# Patient Record
Sex: FEMALE | Race: BLACK OR AFRICAN AMERICAN | NOT HISPANIC OR LATINO | Employment: FULL TIME | URBAN - METROPOLITAN AREA
[De-identification: names, ages, dates, MRNs, and addresses within clinical notes are randomized per-mention and may not be internally consistent; named-entity substitution may affect disease eponyms.]

---

## 2017-04-18 ENCOUNTER — HOSPITAL ENCOUNTER (OUTPATIENT)
Dept: RADIOLOGY | Facility: OTHER | Age: 32
Discharge: HOME | End: 2017-04-18
Attending: PEDIATRICS
Payer: COMMERCIAL

## 2017-04-18 DIAGNOSIS — I36.0 NONRHEUMATIC TRICUSPID (VALVE) STENOSIS: ICD-10-CM

## 2017-04-18 DIAGNOSIS — I42.9 CARDIOMYOPATHY, UNSPECIFIED TYPE: ICD-10-CM

## 2017-04-18 DIAGNOSIS — Q21.3 TOF (TETRALOGY OF FALLOT): ICD-10-CM

## 2017-04-18 DIAGNOSIS — I51.7 ENLARGED RV (RIGHT VENTRICLE): ICD-10-CM

## 2017-04-18 DIAGNOSIS — I47.20 VENTRICULAR TACHYCARDIA: ICD-10-CM

## 2017-04-18 DIAGNOSIS — Z95.810 AICD (AUTOMATIC CARDIOVERTER/DEFIBRILLATOR) PRESENT: ICD-10-CM

## 2017-04-18 DIAGNOSIS — Z95.2 S/P PULMONARY VALVE REPLACEMENT: ICD-10-CM

## 2017-04-18 DIAGNOSIS — Z87.74 TETRALOGY OF FALLOT S/P REPAIR: ICD-10-CM

## 2017-04-18 DIAGNOSIS — Z95.0 CARDIAC PACEMAKER IN SITU: ICD-10-CM

## 2017-04-18 DIAGNOSIS — I49.5 SINUS NODE DYSFUNCTION: ICD-10-CM

## 2017-04-18 PROBLEM — I07.0 TRICUSPID STENOSIS: Status: ACTIVE | Noted: 2017-04-18

## 2017-04-18 PROCEDURE — 71020 XR CHEST PA AND LATERAL: CPT | Mod: TC

## 2017-04-18 PROCEDURE — 71020 XR CHEST PA AND LATERAL: CPT | Mod: 26,,, | Performed by: RADIOLOGY

## 2017-11-21 ENCOUNTER — LAB VISIT (OUTPATIENT)
Dept: LAB | Facility: OTHER | Age: 32
End: 2017-11-21
Attending: PEDIATRICS
Payer: COMMERCIAL

## 2017-11-21 DIAGNOSIS — Q21.3 TETRALOGY OF FALLOT: ICD-10-CM

## 2017-11-21 LAB
ALBUMIN SERPL BCP-MCNC: 3.6 G/DL
ALP SERPL-CCNC: 104 U/L
ALT SERPL W/O P-5'-P-CCNC: 20 U/L
ANION GAP SERPL CALC-SCNC: 7 MMOL/L
AST SERPL-CCNC: 17 U/L
BILIRUB SERPL-MCNC: 1 MG/DL
BNP SERPL-MCNC: 162 PG/ML
BUN SERPL-MCNC: 9 MG/DL
CALCIUM SERPL-MCNC: 9.3 MG/DL
CHLORIDE SERPL-SCNC: 105 MMOL/L
CHOLEST SERPL-MCNC: 179 MG/DL
CHOLEST/HDLC SERPL: 4.8 {RATIO}
CO2 SERPL-SCNC: 26 MMOL/L
CREAT SERPL-MCNC: 0.8 MG/DL
ERYTHROCYTE [DISTWIDTH] IN BLOOD BY AUTOMATED COUNT: 13.7 %
EST. GFR  (AFRICAN AMERICAN): >60 ML/MIN/1.73 M^2
EST. GFR  (NON AFRICAN AMERICAN): >60 ML/MIN/1.73 M^2
GLUCOSE SERPL-MCNC: 92 MG/DL
HCT VFR BLD AUTO: 38.8 %
HDLC SERPL-MCNC: 37 MG/DL
HDLC SERPL: 20.7 %
HGB BLD-MCNC: 12.8 G/DL
LDLC SERPL CALC-MCNC: 123.4 MG/DL
MCH RBC QN AUTO: 27.9 PG
MCHC RBC AUTO-ENTMCNC: 33 G/DL
MCV RBC AUTO: 85 FL
NONHDLC SERPL-MCNC: 142 MG/DL
PLATELET # BLD AUTO: 352 K/UL
PMV BLD AUTO: 9.4 FL
POTASSIUM SERPL-SCNC: 4 MMOL/L
PROT SERPL-MCNC: 7.9 G/DL
RBC # BLD AUTO: 4.59 M/UL
SODIUM SERPL-SCNC: 138 MMOL/L
T3FREE SERPL-MCNC: 1.8 PG/ML
T4 FREE SERPL-MCNC: 0.9 NG/DL
TRIGL SERPL-MCNC: 93 MG/DL
TSH SERPL DL<=0.005 MIU/L-ACNC: 1.42 UIU/ML
WBC # BLD AUTO: 8.12 K/UL

## 2017-11-21 PROCEDURE — 80061 LIPID PANEL: CPT

## 2017-11-21 PROCEDURE — 84481 FREE ASSAY (FT-3): CPT

## 2017-11-21 PROCEDURE — 80053 COMPREHEN METABOLIC PANEL: CPT

## 2017-11-21 PROCEDURE — 85027 COMPLETE CBC AUTOMATED: CPT

## 2017-11-21 PROCEDURE — 36415 COLL VENOUS BLD VENIPUNCTURE: CPT

## 2017-11-21 PROCEDURE — 84439 ASSAY OF FREE THYROXINE: CPT

## 2017-11-21 PROCEDURE — 84443 ASSAY THYROID STIM HORMONE: CPT

## 2017-11-21 PROCEDURE — 83880 ASSAY OF NATRIURETIC PEPTIDE: CPT

## 2017-12-05 DIAGNOSIS — I47.20 VT (VENTRICULAR TACHYCARDIA): Primary | ICD-10-CM

## 2017-12-05 DIAGNOSIS — I49.5 SINUS NODE DYSFUNCTION: ICD-10-CM

## 2018-01-10 ENCOUNTER — TELEPHONE (OUTPATIENT)
Dept: PEDIATRIC CARDIOLOGY | Facility: CLINIC | Age: 33
End: 2018-01-10

## 2018-01-10 NOTE — TELEPHONE ENCOUNTER
Called Mrs. Baez regarding scheduled cardiac cath procedure. Offered February 6th at 10:00 AM. Pre-procedural instructions given and pt stated understanding. Address verified and letter with instructions placed in the mail. Instructed pt to call with any further questions or concerns. Pt. Stated understanding.

## 2018-01-10 NOTE — TELEPHONE ENCOUNTER
----- Message from Lula Goncalves RN sent at 1/3/2018 11:57 AM CST -----      ----- Message -----  From: ELIN Jacobson  Sent: 1/3/2018  11:49 AM  To: NATHAN Gee,     Any idea when this patient will get on the book for a cath? The patient was asking and I just wanted to follow up.    Thanks,  Jason

## 2018-01-11 ENCOUNTER — OFFICE VISIT (OUTPATIENT)
Dept: PEDIATRIC CARDIOLOGY | Facility: CLINIC | Age: 33
End: 2018-01-11
Attending: PEDIATRICS
Payer: COMMERCIAL

## 2018-01-11 ENCOUNTER — OFFICE VISIT (OUTPATIENT)
Dept: CARDIOLOGY | Facility: CLINIC | Age: 33
End: 2018-01-11
Payer: COMMERCIAL

## 2018-01-11 ENCOUNTER — CLINICAL SUPPORT (OUTPATIENT)
Dept: CARDIOLOGY | Facility: CLINIC | Age: 33
End: 2018-01-11
Payer: COMMERCIAL

## 2018-01-11 VITALS
HEIGHT: 69 IN | BODY MASS INDEX: 38.14 KG/M2 | SYSTOLIC BLOOD PRESSURE: 118 MMHG | OXYGEN SATURATION: 96 % | DIASTOLIC BLOOD PRESSURE: 79 MMHG | WEIGHT: 257.5 LBS | HEART RATE: 87 BPM

## 2018-01-11 DIAGNOSIS — Q21.3 TETRALOGY OF FALLOT: ICD-10-CM

## 2018-01-11 DIAGNOSIS — Q24.9 ADULT CONGENITAL HEART DISEASE: ICD-10-CM

## 2018-01-11 DIAGNOSIS — R94.30 EJECTION FRACTION < 50%: ICD-10-CM

## 2018-01-11 DIAGNOSIS — Z87.74 TETRALOGY OF FALLOT S/P REPAIR: ICD-10-CM

## 2018-01-11 DIAGNOSIS — I47.20 VT (VENTRICULAR TACHYCARDIA): ICD-10-CM

## 2018-01-11 DIAGNOSIS — Z95.810 AICD (AUTOMATIC CARDIOVERTER/DEFIBRILLATOR) PRESENT: Primary | ICD-10-CM

## 2018-01-11 DIAGNOSIS — Q21.3 TETRALOGY OF FALLOT: Primary | ICD-10-CM

## 2018-01-11 DIAGNOSIS — I49.5 SINUS NODE DYSFUNCTION: ICD-10-CM

## 2018-01-11 PROCEDURE — 93290 INTERROG DEV EVAL ICPMS IP: CPT | Mod: S$GLB,,, | Performed by: PEDIATRICS

## 2018-01-11 PROCEDURE — 94760 N-INVAS EAR/PLS OXIMETRY 1: CPT | Mod: S$GLB,,, | Performed by: PEDIATRICS

## 2018-01-11 PROCEDURE — 93320 DOPPLER ECHO COMPLETE: CPT | Mod: S$GLB,,, | Performed by: PEDIATRICS

## 2018-01-11 PROCEDURE — 93000 ELECTROCARDIOGRAM COMPLETE: CPT | Mod: 59,S$GLB,, | Performed by: PEDIATRICS

## 2018-01-11 PROCEDURE — 93283 PRGRMG EVAL IMPLANTABLE DFB: CPT | Mod: S$GLB,,, | Performed by: PEDIATRICS

## 2018-01-11 PROCEDURE — 99204 OFFICE O/P NEW MOD 45 MIN: CPT | Mod: 25,S$GLB,, | Performed by: PEDIATRICS

## 2018-01-11 PROCEDURE — 93325 DOPPLER ECHO COLOR FLOW MAPG: CPT | Mod: S$GLB,,, | Performed by: PEDIATRICS

## 2018-01-11 PROCEDURE — 93303 ECHO TRANSTHORACIC: CPT | Mod: S$GLB,,, | Performed by: PEDIATRICS

## 2018-01-11 PROCEDURE — 99215 OFFICE O/P EST HI 40 MIN: CPT | Mod: 25,S$GLB,, | Performed by: PEDIATRICS

## 2018-01-11 RX ORDER — LEVOTHYROXINE SODIUM 150 UG/1
137 TABLET ORAL
COMMUNITY
End: 2019-02-14 | Stop reason: SDUPTHER

## 2018-01-11 NOTE — PROGRESS NOTES
Sean Baez was seen in the Adult with Congenital Heart Disease Clinic at [facility] .  She is now a 32 and a half-year-old -American woman who was born with tetralogy of Fallot.  She underwent repair of tetralogy of Fallot in early childhood, and then a pulmonary valve replacement later in childhood.  In May 2005, she had a second pulmonary valve replacement with a 29 mm Mosiac porcine valve in the pulmonary position because of pulmonary insufficiency, and placement of an epicardial pacemaker for sinus node dysfunction, and placement of an automatic intracardiac defibrillator for ventricular tachycardia.  The AICD generator was changed in 2011.  We have been following her in this clinic with a diagnosis of right ventricular dysfunction, a well-functioning porcine pulmonary valve, ventricular tachycardia and sinus node dysfunction, pacemaker and AICD, tricuspid stenosis, mitral insufficiency, and mild left ventricular dysfunction.  Her rhythm has been well controlled over the last several years.  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  Her pacemaker has been working well and she has had no shocks from the AICD.  She is followed by electrophysiologist Dr. Tyler.       At a clinic visit about 9 months ago, she was feeling well.  Physical exam showed a grade 2 to 3/6 systolic ejection murmur best heard at the left mid to upper sternal border and over the precordium, and a newly heard diastolic rumble at the left lower sternal border. The liver edge was 2 cm below the right costal margin.  Pulses were 2+ in brachial and 1+ in femoral, although it was difficult to feel her pulse is secondary to obesity.  There was no peripheral edema.  An EKG showed sinus rhythm at 68 bpm, normal atrial and ventricular forces, and T-wave inversions in lead 1 V5 and V6 suggestive of ischemia (unchanged).  An echocardiogram showed an aortic root of 30 mm, the left  atrium was dilated at 44 mm, (no old values for comparison), the right ventricle was 34 mm which was slightly increased from 32 mm, and the fractional area change of 30%, showed mildly reduced function which had improved.  The interventricular septum measured 11 on the left ventricular posterior wall 12 mm which are at the upper limits of normal, the left ventricular internal dimension in diastole was 52 and in systole 36 mm giving him measured ejection fraction of 57%.  The ejection fraction is slightly decreased but improved since the previous value.  The pulmonary valve annulus measured 19 mm. There was slight paradoxic motion of the interventricular septum, with otherwise normal-appearing right and left ventricular function.  The tricuspid valve domed in diastole.  The pulmonary valve leaflets could be seen and the pulmonary annulus of 19 mm was probably underestimated.  The right atrium was dilated at 61 x 57 mm.  The left pulmonary artery measured 12 mm and the right pulmonary artery could not be seen.  The aortic arch was left-sided and unobstructed.  The gradient across the tricuspid valve was 9 peak and 4 mean mmHg gradient indicating mild stenosis, unchanged.  There was trivial tricuspid regurgitation with a regurgitant gradient of 24 mmHg suggesting normal right ventricular pressure.  The gradient across the porcine pulmonary valve was 22 peak and 13 mean mmHg showing trivial obstruction, and there was trivial pulmonary insufficiency.  There was mild mitral insufficiency and no residual ventricular septal defect.  An exercise stress test showed it Ceres exercised 7 minutes and 30 seconds of the Sam protocol, and then stopped due to fatigue and leg cramps.  Baseline blood pressure was 108/77 mmHg and then increased to 159/68 mmHg, which may be an underestimate, before returning to baseline.  Baseline heart rate was 68 bpm and increased to 147 bpm before returning to baseline.  There were initially  T-wave inversions in lead I V5 and V6, which then reverted to upright during exercise, before becoming negative again in recovery.  There were no other ST segment changes or arrhythmias.      At that visit, I felt  that Steven was stable with her complex congenital heart disease.  Her right and left ventricular function had improved.  Her porcine pulmonary valve was functioning well with only mild stenosis and insufficiency.  She had mild tricuspid stenosis with significant right atrial dilatation, also unchanged.  She had left atrial dilatation which I felt was secondary to her mitral insufficiency and perhaps some diastolic dysfunction, also unchanged.  Per her report, her pacemaker was functioning properly and her rhythm was controlled on her current dose of metoprolol.  I requested records from her electrophysiologist. No cardiac intervention was recommended  A CBC, CMP, BNP, and lipid profile were drawn which showed a normal CBC, and normal CMP, a BNP of 82 pg/mL, an elevated total cholesterol of 220 mg/dL, a low HDL cholesterol of 36 mg/dL, normal triglycerides, and an elevated LDL cholesterol of 168 mg/dL.  A chest x-ray was obtained at that visit which showed mild cardiomegaly with normal vascularity, epicardial pacing leads on the right atrium and right ventricle and the AICD patch at the apex.  Sean reported that she is getting  in a few months and desires pregnancy.  This is a routine follow-up.       At her last clinic visit 2 months ago, Sean was not feeling well.  Approximately 3 months prior, while walking on the beach, she had the sudden onset of fatigue and shortness of breath.  He felt her heart was racing but not in an abnormal way.  She also began sweating, but had no chest pain.  Although she sat down and went into air-conditioning, the fatigue, shortness of breath and sweating persisted for another 30 minutes.  Following that, she has noticed an increase in fatigue and exercise  intolerance.  She has not participated in any exercise for that reason.  She states she has the same symptoms as before her last valve replacement.  Sean has also had a very busy life and lots of stresses.  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  She has had increased fatigue with these activities, and is also sleeping more.  Her last pacemaker check was about 9 months ago.  She has had no shocks.  She was recently started on atorvastatin for elevated cholesterol, which was her only medication change.  She was taking metoprolol succinate 50 mg by mouth daily, enalapril 5 mg in the morning and 2.5 mg at night, aspirin 81 mg by mouth daily, atorvastatin 20 mg by mouth daily, Zoloft 100 mg by mouth daily, and venlafaxine 150 mg by mouth daily.  Her exam showed blood pressure in the right arm was 119/76 mmHg.  Pulse was 91 bpm and pulsed oximetry in room air was 98%.  First heart sound was normal and second heart sound was widely split.  There was a grade 2/6 systolic ejection murmur best heard at the left upper sternal border and down the left sternal border.  A diastolic rumble was heard.  The liver was 2 cm below the right costal margin and unchanged.  The pacemaker was palpated in the upper mid abdomen.  Pulses were 2+ bilaterally except for 1+ in the right femoral.  There was no peripheral edema.     EKG showed sinus rhythm at a rate of 73 bpm with first-degree block (386 ms).  T-wave inversions in leads II and aVF, normal ventricular forces, and T wave inversions in leads V1 and V5, with T-wave flattening in V6.  Compared to the previous EKG, it was no change.     An echocardiogram was obtained which showed the anatomy for tetralogy of Fallot status post complete repair and status post a pulmonary valve replacement.  It should be  mentioned that the heart was difficult to visualize and images were of poor quality chewer attributed to obesity.  M-mode parameters were as follows: The aortic root measured 30 mm, unchanged, left atrium was 35 mm, improved, right ventricle 34 mm, unchanged.  The interventricular septum measured 11 and the left ventricular posterior wall 11 mm both of which were at the upper limits of normal.  The left ventricular internal dimension in diastole was 56 and in systole 43 mm giving a calculated ejection fraction of 46% which has decreased since the previous study.  There was flattening of septal wall motion.  The left ventricular posterior wall bowed posteriorly.  The pulmonary valve leaflets are difficult to see.  The tricuspid valve leaflets were difficult to see, but the tricuspid valve annulus was subjectively smaller than the mitral valve annulus.  The ventriculoseptal defect patch was in proper position.  Right ventricular function was judged subjectively to be mildly depressed, with a fractional area change measured at 30%, which is within normal limits.  The atrial septum was intact.  The superior vena cava drains normally into the right atrium.  The branch pulmonary arteries could not be imaged.  The aortic arch was left-sided and unobstructed.  Doppler analysis using pulsed continuous-wave and color-flow: The gradient across the tricuspid valve was 10 peak and 5 mean millimeters of mercury indicating mild tricuspid stenosis which is unchanged.  The gradient across the bioprosthetic pulmonary valve was 23 peak millimeters of mercury which is unchanged.  Trivial pulmonary insufficiency.  No tricuspid insufficiency.  No aortic stenosis or aortic insufficiency.  Trivial mitral insufficiency, improved.  No aortic arch obstruction.  Impression: Difficult cardiac imaging.  Status post repair of tetralogy of flow, status post pulmonary valve replacement, pacemaker and AICD.  Mildly dilated right ventricle with  subjectively mildly decreased systolic function area mild tricuspid stenosis with a 10 peak and 5 mean millimeter mercury gradient across it, unchanged.  Mildly dilated left ventricle which has increased since the previous study, and the ejection fraction has decreased to 46%.  Mild pulmonary valve stenosis and no pulmonary valve insufficiency.     The pacemaker was then downloaded by the [employer] representative, and it showed that she is atrially paced 6% of the time with no ventricular pacing.  She had a prolonged AV interval of 370 ms.  Her underlying rhythm was sinus bradycardia with a ventricular rate in the 40s.  She had no abnormal tachycardias greater than 150 bpm.  Her atrial and ventricular thresholds were good.  She had less than one year life on her battery.  We adjusted her pacemaker settings to have rates detected (monitor zone) greater than 130 bpm for 32 beats in duration.     It was my impression that Sean has had a decrease in exercise tolerance and an increase in fatigue which was cardiac related.  She was difficult to evaluate by transthoracic echocardiography, probably secondary to obesity.  I did not think that her episode months ago was related to a pacemaker or rhythm problem.  Rather, I felt it was secondary to an increase in tricuspid stenosis or bioprosthetic pulmonary valve dysfunction.  We also cannot rule out coronary artery disease, since she has had a decrease in left ventricular function and she has an elevated cholesterol and LDL.  Her symptoms are probably exacerbated by the stresses of finishing her residency, moving across the country, starting a new job, and planning a wedding.  For these reasons, I feel she needs more aggressive evaluation and management.     Therefore, I began Sean on coenzyme Q 10 100 mg by mouth twice a day, for her left ventricular dysfunction, right ventricular dysfunction, and because she was started on a statin drug.  I then presented her  in/surgery conference for cardiac catheterization, where her tricuspid valve, pulmonary valve and coronary arteries could be evaluated, and possibly a Serina valve could be implanted and or a tricuspid valve balloon.  At the same time, she I wanted her to have a transesophageal echocardiogram since her transthoracic imaging was poor.   I kept her on the same doses of her other medications, enalapril metoprolol, atorvastatin, and aspirin.    Laboratory work which was obtained at that visit subsequently showed a normal CBC with the exception of a mildly elevated platelet count of 352,000, and a normal CMP.  Of note, her BNP was mildly elevated to  162 pg/mL, compared to the previous value.  A free T4 was normal at 0.9 ng/dL, but her free T3 was decreased at 1.8 pg/mL, although her TSH was normal at 1.4 to micro-units per milliliter.  Additionally, her cholesterol profile was repeated now that she was on atorvastatin, and that showed the cholesterol had decreased to 1 79 mg/dL, and the LDL cholesterol had decreased to 123.4 mg/dL, which were now normal, although her HDL remained decreased at 37 mg/dL.  Sean was then also subsequently referred to a terminal medicine specialist for management of possible hypothyroidism.  She was given this follow-up appointment to be evaluated by both myself as well as electrophysiologist Dr. Chintan Cabrera.         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  A catheterization and possible Serina valve implantation has been scheduled for February 6, 2018.  Her wedding is scheduled for March 10, 2018.  Her work schedule is rather demanding, and she has 7-9 days in-house as a hospitalist, followed by 7 days off.  He states however that she feels better than she did at her last clinic visit, and in particular has  less fatigue.  She still continues to be short of breath with climbing stairs and cannot go up more than 2 flights.  She has not had any further episodes of sudden onset of shortness of breath, weakness and sweating.  She denies palpitations or chest pain.  She has started a diet and light exercise program.    Sean is currently taking metoprolol XL 50 mg by mouth daily, enalapril 5 mg in the morning and 2.5 mg at night, aspirin 81 mg by mouth daily, coenzyme Q10 200 mg by mouth twice a day, atorvastatin 20 mg by mouth daily, Synthroid 150 µg by mouth daily, Zoloft 100 mg by mouth daily, and she is now weaning her Effexor down to 75 mg by mouth daily.    Physical exam revealed an obese but very pleasant and otherwise healthy appearing young woman in no acute distress.  Vital signs showed a height of 175.3 cm or 5 feet 9 inches, and a weight of 116.8 kg or 256 7 lbs. 8 oz., which is a 1 kg weight decrease since her last clinic visit.  Pulse was 87 bpm and pulsed oximetry in room air was 96%.    Examination of the skin showed it to be pink and well perfuse.  The lungs were clear.  Palpation of the precordium revealed a well-healed midline scar, and a pacemaker palpated in the abdomen.  First heart sound was normal and second heart sound was widely split.  The was a grade 2/6 systolic ejection murmur heard best at the left upper sternal border, and faintly over the precordium.  Diastole was clear (the previously her diastolic rumble has resolved).  The liver edge was 2 cm below the right costal margin and unchanged.  Pulses were 2+ bilaterally.  There was no peripheral edema.    An EKG was obtained which showed an atrially paced rhythm at a rate of 68 bpm with a prolonged CO interval of 112 ms, with right ventricular conduction delay and negative T waves in the left precordial leads.  The EKG is unchanged.    An echocardiogram was obtained which showed the anatomy for status post repair of tetralogy of Fallot and  placement of a bioprosthetic valve in the pulmonary position.  Two-dimensional imaging was poor.  M-mode parameters showed the aortic root was mildly dilated at 31 mm, unchanged, but the left atrium has increased to 45 mm. the right ventricle measured 29 mm, unchanged.  The interventricular septum measured 12 and the left ventricular posterior wall 12 mm which were at the upper limits of normal.  The left ventricular internal dimension in diastole was 53 and in systole 37 mm giving a calculated ejection fraction of 58%, this has improved significantly from her previous ejection fraction of 46% 2 months ago.  The right ventricle was not seen well enough quantitate function, but subjectively appeared improved since the previous visit.  Ventriculoseptal defect patch was in proper position.  They bioprosthetic valve was poorly seen in the pulmonary position, and the leaflets were thick and moving poorly.  The right atrium was significantly dilated at 54 x 53 mm.  The tricuspid valve was not seen well enough to measure the annulus.  The mitral valve annulus was 31 mm.  The branch pulmonary arteries could not be imaged.  The aortic arch was left-sided and unobstructed.  Doppler analysis using pulsed continuous-wave and color-flow:  The gradient across the tricuspid valve was 11 peak and 5 mean millimeters of mercury indicating mild stenosis which was unchanged.  The gradient across the right ventricular outflow tract was 27 mmHg peak.  There was mild pulmonary insufficiency.  There was trivial tricuspid insufficiency which was inadequate to estimate right ventricular pressures.  No mitral insufficiency, aortic stenosis or aortic insufficiency.  Impression: Status post repair of tetralogy of Fallot, and subsequent pulmonary valve replacement.  Poor cardiac imaging.  Marked improvement in left ventricular function, with the ejection fraction increasing to 58%.  Subjectively proved right ventricular function, which is  difficult to quantitate.  Bioprosthetic pulmonary valve with thickened and poorly moving leaflets.  Significantly dilated right atrium.  Tricuspid valve stenosis which is probably mild.  Unable to see branch pulmonary arteries.    Sean was evaluated by Dr. Chintan Cabrera, who felt that Sean did not have any normal tachycardia arrhythmias.  Her defibrillator, was approaching end-of-life.  Please see Dr. Cabrera's evaluation for more details.  He recommended that Sean receive monthly pacemaker/AICD evaluations to determine the best time for replacement.    It is my impression that Wilma goldstein left ventricular function has improved significantly since being placed on coenzyme every 10 and thyroid replacement hormone, both of which are probably contributing to that improvement.  I believe this has led to a decrease in symptoms and an improvement in energy level.  I continue to feel, however, that Sean still requires a transesophageal echocardiogram and a cardiac catheterization and possible Serina valve replacement.  I do feel, however, that this can be delayed until after her wedding, to avoid the unlikely chance that there will be a complication that will impact on her wedding plans.  I do not feel that waiting an additional month to perform the catheterization will adversely affect her health.  I have discussed this with Sean and her fiancé, and they will let me know whether they want to the catheterization postponed by one month.    Sean also expressed the desire to undergo pregnancy at some time in the future.  I feel she should have her cardiac catheterization and possible Serina valve implantation, and also her AICD generator change, before becoming pregnant.    I would like Sean to continue on the same doses of her medications.  She should come in for monthly pacemaker and AICD battery checks until the timing is determined for her generator change.  She will have cardiac catheterization and  transesophageal echocardiogram performed either in February or [facility].  SBE prophylaxis continues to be in order for dental and surgical procedures.    Further disposition for the cardiac status of Sean Baez will be determined following her cardiac catheterization and electrophysiologic evaluations.       Thank you very much for allowing up to participate the care of your patient.       Sincerely      Chantel Saleh

## 2018-01-11 NOTE — PROGRESS NOTES
Thank you for referring your patient Sean Baez to the electrophysiology clinic for consultation. Please review my findings below.    CHIEF COMPLAINT: EP evaluation of ICD    HISTORY OF PRESENT ILLNESS:   33 y/o female with TOF s/p repair.  She had most recent pulmonary valve replacement in 2005 and placement of epicardial dual chamber ICD with epicardial patch for sinus node dysfunction and VT. She had AICD gen change in 2011 for VT.   Seen in November 2017 by Dr. Saleh.  Still had TS but unchanged.  LV dilated and EF down to 46%.  Valve looked ok by report.  Changed rate detection to 130.  She is scheduled for hemodynamic cath in February.  She is taking Metoprolol 50 mg po daily.  She is having palpitations.  She was started on CoQ10.  History of inappropriate shock x 2 in 2005 but none since.  She was feeling very fatigued in November.  In the interim she was started on thyroid medication and CoQ10 and reports having markedly increased energy.  She had episode of sweating and really marked fatigue in August with going up steep incline and walking in sand.  She was started on thyroid medication    REVIEW OF SYSTEMS:     GENERAL: No fever, chills,or weight loss. See HPI  SKIN: No rashes, itching or changes in color or texture of skin.  CHEST: Denies  cyanosis, wheezing, cough and sputum production. See HPI  CARDIOVASCULAR: see HPI  ABDOMEN: Appetite fine. No weight loss. Denies diarrhea, abdominal pain, or vomiting.  PERIPHERAL VASCULAR: No claudication or cyanosis.  MUSCULOSKELETAL: No joint stiffness or swelling.   NEUROLOGIC: No history of seizures,  alteration of gait or coordination.    PAST MEDICAL HISTORY:   Past Medical History:   Diagnosis Date    Sinus node dysfunction     TOF (tetralogy of Fallot)     V-tach            FAMILY HISTORY:   Family History   Problem Relation Age of Onset    Hypertension Mother     Hyperlipidemia Mother     Hypertension Father     Heart attacks under age  "50 Maternal Grandmother     Stroke Maternal Grandmother     Stroke Maternal Grandfather     COPD Paternal Grandfather          SOCIAL HISTORY:   Social History     Social History    Marital status: Single     Spouse name: N/A    Number of children: N/A    Years of education: N/A     Occupational History    Not on file.     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        ALLERGIES:  Review of patient's allergies indicates:  No Known Allergies    MEDICATIONS:    Current Outpatient Prescriptions:     aspirin (ECOTRIN) 81 MG EC tablet, Take 81 mg by mouth 4 (four) times daily., Disp: , Rfl:     atorvastatin (LIPITOR) 20 MG tablet, Take 20 mg by mouth once daily., Disp: , Rfl:     coQ10, ubiquinol, 100 mg Cap, Take 100 mg by mouth 2 (two) times daily., Disp: 62 capsule, Rfl: 3    enalapril (VASOTEC) 5 MG tablet, Take 5 mg by mouth once daily., Disp: , Rfl:     levocetirizine (XYZAL) 5 MG tablet, Take 5 mg by mouth 4 (four) times daily., Disp: , Rfl:     levothyroxine (SYNTHROID) 150 MCG tablet, Take 150 mcg by mouth before breakfast., Disp: , Rfl:     metoprolol tartrate (LOPRESSOR) 50 MG tablet, Take 50 mg by mouth once daily. , Disp: , Rfl:     montelukast (SINGULAIR) 10 mg tablet, Take 10 mg by mouth every evening., Disp: , Rfl:     sertraline (ZOLOFT) 100 MG tablet, Take 100 mg by mouth once daily., Disp: , Rfl:     venlafaxine (EFFEXOR-XR) 150 MG Cp24, Take 75 mg by mouth once daily. , Disp: , Rfl:       PHYSICAL EXAM:   Vitals:    01/11/18 1008   BP: 118/79   Pulse: 87   SpO2: 96%   Weight: 116.8 kg (257 lb 8 oz)   Height: 5' 9" (1.753 m)         GENERAL: Awake, well-developed well-nourished, no apparent distress  HEENT: mucous membranes moist and pink, " normocephalic atraumatic, no cranial or carotid bruits, sclera anicteric  NECK: no jugular venous distention, no lymphadenopathy  CHEST: Good air movement, clear to auscultation bilaterally  CARDIOVASCULAR: Quiet precordium, regular rate and rhythm, S1S2, no murmurs rubs or gallops  ABDOMEN: Soft, nontender nondistended, no hepatomegaly, no aortic bruits  EXTREMITIES: Warm well perfused, 2+ radial/pedal pulses, capillary refill 2 seconds, no clubbing, cyanosis, or edema  NEURO: Alert and oriented, cooperative with exam, face symmetric, moves all extremities well    STUDIES:  MDT ICD PROTECTA ICD. Battery 2.65V(MIKAYLA 2.63V). P-wave 0.8mV, Imp, 608 ohms, threshold, 0.5V @0.4ms. R-wave 6.6 mV, Imp 703 ohms, threshold 1.0V @ 0.4ms. Ap 65%,  0%. No episodes.     ECG: Atrial paced with intact but prolonged AV conduction             QRS duration 100 msec              T wave inversion in anterolateral leads      ASSESSMENT:  33 y/o female with TOF s/p repair and tricuspid stenosis with ICD.  She is nearing elective replacement.    PLAN:   F/u battery check in 1 month in clinic.  Continue metoprolol at current dosing.  Cath timing decision as per Dr. Saleh  Call for chest pain, syncope, palpitations, or any other questions or concerns.      Time Spent: 50 (min) with over 50% in direct patient and family consultation regarding management of ICD with congenital heart disease.      The patient's doctor will be notified via EPIC/FAX    I hope this brings you up-to-date on [place] [facility]  Please contact me with any questions or concerns.    Chintan Cabrera MD  Pediatric and Adult Congenital Electrophysiologist  Pediatric Cardiologist

## 2018-01-12 PROBLEM — R94.30 EJECTION FRACTION < 50%: Status: ACTIVE | Noted: 2018-01-12

## 2018-01-15 ENCOUNTER — TELEPHONE (OUTPATIENT)
Dept: PEDIATRIC CARDIOLOGY | Facility: CLINIC | Age: 33
End: 2018-01-15

## 2018-01-15 NOTE — TELEPHONE ENCOUNTER
----- Message from Herbert Kenyon sent at 1/15/2018  8:40 AM CST -----  Contact: Self [phone]  Patient states that she would like to postpone cath procedure until after her wedding date. She is requesting a return phone call from Sunny

## 2018-01-15 NOTE — TELEPHONE ENCOUNTER
Spoke to pt regarding rescheduling procedure until after the wedding of March 10th. Informed pt that she will receive a call with potential dates to reschedule. Pt. Stated understanding. Dr. Colunga updated at this time.

## 2018-01-26 PROBLEM — Q24.9 ADULT CONGENITAL HEART DISEASE: Status: ACTIVE | Noted: 2018-01-26

## 2018-01-28 DIAGNOSIS — Z95.810 ICD (IMPLANTABLE CARDIOVERTER-DEFIBRILLATOR) IN PLACE: Primary | ICD-10-CM

## 2018-02-01 ENCOUNTER — OFFICE VISIT (OUTPATIENT)
Dept: CARDIOLOGY | Facility: CLINIC | Age: 33
End: 2018-02-01
Payer: COMMERCIAL

## 2018-02-01 ENCOUNTER — OFFICE VISIT (OUTPATIENT)
Dept: PEDIATRIC CARDIOLOGY | Facility: CLINIC | Age: 33
End: 2018-02-01
Attending: PEDIATRICS
Payer: COMMERCIAL

## 2018-02-01 ENCOUNTER — HOSPITAL ENCOUNTER (OUTPATIENT)
Dept: RADIOLOGY | Facility: OTHER | Age: 33
Discharge: HOME | End: 2018-02-01
Attending: PEDIATRICS
Payer: COMMERCIAL

## 2018-02-01 ENCOUNTER — CLINICAL SUPPORT (OUTPATIENT)
Dept: CARDIOLOGY | Facility: CLINIC | Age: 33
End: 2018-02-01
Payer: COMMERCIAL

## 2018-02-01 VITALS
OXYGEN SATURATION: 98 % | HEART RATE: 87 BPM | SYSTOLIC BLOOD PRESSURE: 107 MMHG | DIASTOLIC BLOOD PRESSURE: 73 MMHG | BODY MASS INDEX: 38.87 KG/M2 | HEIGHT: 69 IN | WEIGHT: 262.44 LBS

## 2018-02-01 VITALS
SYSTOLIC BLOOD PRESSURE: 107 MMHG | WEIGHT: 262.44 LBS | OXYGEN SATURATION: 98 % | DIASTOLIC BLOOD PRESSURE: 73 MMHG | BODY MASS INDEX: 38.87 KG/M2 | HEART RATE: 87 BPM | HEIGHT: 69 IN

## 2018-02-01 DIAGNOSIS — I47.20 VT (VENTRICULAR TACHYCARDIA): ICD-10-CM

## 2018-02-01 DIAGNOSIS — I36.0 NONRHEUMATIC TRICUSPID (VALVE) STENOSIS: ICD-10-CM

## 2018-02-01 DIAGNOSIS — R94.30 EJECTION FRACTION < 50%: ICD-10-CM

## 2018-02-01 DIAGNOSIS — Q24.9 ADULT CONGENITAL HEART DISEASE: ICD-10-CM

## 2018-02-01 DIAGNOSIS — Z95.810 ICD (IMPLANTABLE CARDIOVERTER-DEFIBRILLATOR) IN PLACE: ICD-10-CM

## 2018-02-01 DIAGNOSIS — Z87.74 TETRALOGY OF FALLOT S/P REPAIR: ICD-10-CM

## 2018-02-01 DIAGNOSIS — Z95.0 CARDIAC PACEMAKER IN SITU: ICD-10-CM

## 2018-02-01 DIAGNOSIS — Z95.810 AICD (AUTOMATIC CARDIOVERTER/DEFIBRILLATOR) PRESENT: ICD-10-CM

## 2018-02-01 DIAGNOSIS — I47.20 VT (VENTRICULAR TACHYCARDIA): Primary | ICD-10-CM

## 2018-02-01 DIAGNOSIS — Q21.3 TETRALOGY OF FALLOT: ICD-10-CM

## 2018-02-01 DIAGNOSIS — E03.8 OTHER SPECIFIED HYPOTHYROIDISM: ICD-10-CM

## 2018-02-01 PROBLEM — E03.9 HYPOTHYROIDISM: Status: ACTIVE | Noted: 2018-02-01

## 2018-02-01 PROCEDURE — 74018 RADEX ABDOMEN 1 VIEW: CPT | Mod: 26,,, | Performed by: RADIOLOGY

## 2018-02-01 PROCEDURE — 71045 X-RAY EXAM CHEST 1 VIEW: CPT | Mod: TC,FY

## 2018-02-01 PROCEDURE — 93303 ECHO TRANSTHORACIC: CPT | Mod: 26,,, | Performed by: PEDIATRICS

## 2018-02-01 PROCEDURE — 3008F BODY MASS INDEX DOCD: CPT | Mod: S$GLB,,, | Performed by: PEDIATRICS

## 2018-02-01 PROCEDURE — 93320 DOPPLER ECHO COMPLETE: CPT | Mod: 26,,, | Performed by: PEDIATRICS

## 2018-02-01 PROCEDURE — 93283 PRGRMG EVAL IMPLANTABLE DFB: CPT | Mod: S$GLB,,, | Performed by: PEDIATRICS

## 2018-02-01 PROCEDURE — 93325 DOPPLER ECHO COLOR FLOW MAPG: CPT | Mod: 26,,, | Performed by: PEDIATRICS

## 2018-02-01 PROCEDURE — 93290 INTERROG DEV EVAL ICPMS IP: CPT | Mod: S$GLB,,, | Performed by: PEDIATRICS

## 2018-02-01 PROCEDURE — 74018 RADEX ABDOMEN 1 VIEW: CPT | Mod: TC,FY

## 2018-02-01 PROCEDURE — 93000 ELECTROCARDIOGRAM COMPLETE: CPT | Mod: S$GLB,,, | Performed by: PEDIATRICS

## 2018-02-01 PROCEDURE — 71045 X-RAY EXAM CHEST 1 VIEW: CPT | Mod: 26,,, | Performed by: RADIOLOGY

## 2018-02-01 PROCEDURE — 99214 OFFICE O/P EST MOD 30 MIN: CPT | Mod: 25,S$GLB,, | Performed by: PEDIATRICS

## 2018-02-01 PROCEDURE — 99215 OFFICE O/P EST HI 40 MIN: CPT | Mod: 25,S$GLB,, | Performed by: PEDIATRICS

## 2018-02-01 NOTE — PROGRESS NOTES
Thank you for referring your patient Sean Baez to the electrophysiology clinic for consultation. Please review my findings below.    CHIEF COMPLAINT: EP evaluation of ICD    HISTORY OF PRESENT ILLNESS:   33 y/o female with TOF s/p repair.  She had most recent pulmonary valve replacement in 2005 and placement of epicardial dual chamber ICD with epicardial patch for sinus node dysfunction and VT. She had AICD gen change in 2011 for VT.   Seen in November 2017 by Dr. Saleh.  Still had TS but unchanged.  LV dilated and EF down to 46%.  Valve looked ok by report.  Changed rate detection to 130.   She was started on CoQ10.  History of inappropriate shock x 2 in 2005 but none since.  She was feeling very fatigued in November.  In the interim she was started on thyroid medication and CoQ10 and reports having markedly increased energy.  She had episode of sweating and really marked fatigue in August with going up steep incline and walking in sand.  She was started on thyroid medication    Sean was last seen by me in January 2018.  She returns today for scheduled follow up to re-evaluate pacemaker battery.  She had one episode where she was working out and got diaphoretic and palpitations and felt lightheaded.  She had not eaten and was high intensity workout.  She has worked out since then and not had a problem.  She takes metoprolol in am and had taken it that day.     REVIEW OF SYSTEMS:     GENERAL: No fever, chills,or weight loss. See HPI  SKIN: No rashes, itching or changes in color or texture of skin.  CHEST: Denies  cyanosis, wheezing, cough and sputum production. See HPI  CARDIOVASCULAR: see HPI  ABDOMEN: Appetite fine. No weight loss. Denies diarrhea, abdominal pain, or vomiting.  PERIPHERAL VASCULAR: No claudication or cyanosis.  MUSCULOSKELETAL: No joint stiffness or swelling.   NEUROLOGIC: No history of seizures,  alteration of gait or coordination.    PAST MEDICAL HISTORY:   Past Medical History:    Diagnosis Date    Sinus node dysfunction     TOF (tetralogy of Fallot)     V-tach            FAMILY HISTORY:   Family History   Problem Relation Age of Onset    Hypertension Mother     Hyperlipidemia Mother     Hypertension Father     Heart attacks under age 50 Maternal Grandmother     Stroke Maternal Grandmother     Stroke Maternal Grandfather     COPD Paternal Grandfather          SOCIAL HISTORY:   Social History     Social History    Marital status: Single     Spouse name: N/A    Number of children: N/A    Years of education: N/A     Occupational History    Not on file.     Social History Main Topics    Smoking status: Not on file    Smokeless tobacco: Not on file    Alcohol use Not on file    Drug use: Unknown    Sexual activity: Not on file     Other Topics Concern    Not on file     Social History Narrative    Physician at .        ALLERGIES:  Review of patient's allergies indicates:  No Known Allergies    MEDICATIONS:    Current Outpatient Prescriptions:     aspirin (ECOTRIN) 81 MG EC tablet, Take 81 mg by mouth 4 (four) times daily., Disp: , Rfl:     atorvastatin (LIPITOR) 20 MG tablet, Take 20 mg by mouth once daily., Disp: , Rfl:     coQ10, ubiquinol, 100 mg Cap, Take 100 mg by mouth 2 (two) times daily., Disp: 62 capsule, Rfl: 3    enalapril (VASOTEC) 5 MG tablet, Take 5 mg by mouth once daily., Disp: , Rfl:     levocetirizine (XYZAL) 5 MG tablet, Take 5 mg by mouth 4 (four) times daily., Disp: , Rfl:     levothyroxine (SYNTHROID) 150 MCG tablet, Take 150 mcg by mouth before breakfast., Disp: , Rfl:     metoprolol tartrate (LOPRESSOR) 50 MG tablet, Take 50 mg by mouth once daily. , Disp: , Rfl:     montelukast (SINGULAIR) 10 mg tablet, Take 10 mg by mouth every evening., Disp: , Rfl:     sertraline (ZOLOFT) 100 MG tablet, Take 100 mg by mouth once daily., Disp: , Rfl:     venlafaxine (EFFEXOR-XR) 150 MG Cp24, Take 75 mg by mouth once daily. , Disp:  , Rfl:       PHYSICAL EXAM:   There were no vitals filed for this visit.      GENERAL: Awake, well-developed well-nourished, no apparent distress  HEENT: mucous membranes moist and pink, normocephalic atraumatic, no cranial or carotid bruits, sclera anicteric  NECK: no jugular venous distention, no lymphadenopathy  CHEST: Good air movement, clear to auscultation bilaterally  CARDIOVASCULAR: Quiet precordium, regular rate and rhythm, S1S2, no murmurs rubs or gallops  ABDOMEN: Soft, nontender nondistended, no hepatomegaly, no aortic bruits  EXTREMITIES: Warm well perfused, 2+ radial/pedal pulses, capillary refill 2 seconds, no clubbing, cyanosis, or edema  NEURO: Alert and oriented, cooperative with exam, face symmetric, moves all extremities well    STUDIES:  ICD:  MDT Jeff DUNAWAY DR ICD. Battery 2.63V(near MIKAYLA). Pwave 0.9mV, Imp 608 ohms, threshold 0.75V @ 0.4ms. Rwave 5.9V, Imp 665 ohms, threshold 1.25V @ 0.4ms. 1 VTNS - 6 beats(250 bpm). 3 ST/SVT episode. On 1/23 was working out when she became dizzy, nauseated, and weak. Had not eaten breakfast or drank much that morning. Recovered after sitting haily. Did not feel VT event on 1/25.    ECG: Atrial paced with intact but prolonged AV conduction              T wave inversion in anterolateral and inferior leads      ASSESSMENT:  31 y/o female with TOF s/p repair and tricuspid stenosis with ICD.  She is at elective replacement.    PLAN:   Schedule generator change in late March given wedding in early march and patient preference.  Change Metoprolol XL to 50mg BID  CXR / KUB to look at device and leads.  Cath timing decision as per Dr. Saleh  Call for chest pain, syncope, palpitations, or any other questions or concerns.      Time Spent: 50 (min) with over 50% in direct patient and family consultation regarding management of ICD with congenital heart disease and need for generator change in next 3 months.    The patient's doctor will be notified via EPIC/FAX    I  hope this brings you up-to-date on Sean Baez  Please contact me with any questions or concerns.    Chintan Cabrera MD  Pediatric and Adult Congenital Electrophysiologist  Pediatric Cardiologist

## 2018-02-01 NOTE — PROGRESS NOTES
Sean Baez was seen in the Adult with Congenital Heart Disease Clinic at [facility] on February 1, 2018.  She is now an almost 33 year old -American woman who was born with tetralogy of Fallot.  She underwent repair of tetralogy of Fallot in early childhood, and then a pulmonary valve replacement later in childhood.  In May 2005, she had a second pulmonary valve replacement with a 29 mm Mosiac porcine valve in the pulmonary position because of pulmonary insufficiency, and placement of an epicardial pacemaker for sinus node dysfunction, and placement of an automatic intracardiac defibrillator for ventricular tachycardia.  The AICD generator was changed in 2011.  We have been following her in this clinic with a diagnosis of right ventricular dysfunction, a well-functioning porcine pulmonary valve, ventricular tachycardia and sinus node dysfunction, pacemaker and AICD, tricuspid stenosis, mitral insufficiency, and mild left ventricular dysfunction.  Her rhythm has been well controlled over the last several years.  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  Her pacemaker has been working well and she has had no shocks from the AICD.  She is followed by electrophysiologist Dr. Tyler.       At a clinic visit about 10 months ago, she was feeling well.  Physical exam showed a grade 2 to 3/6 systolic ejection murmur best heard at the left mid to upper sternal border and over the precordium, and a newly heard diastolic rumble at the left lower sternal border. The liver edge was 2 cm below the right costal margin.  Pulses were 2+ in brachial and 1+ in femoral, although it was difficult to feel her pulse is secondary to obesity.  There was no peripheral edema.  An EKG showed sinus rhythm at 68 bpm, normal atrial and ventricular forces, and T-wave inversions in lead 1 V5 and V6 suggestive of ischemia (unchanged).  An echocardiogram showed an aortic root of 30  mm, the left atrium was dilated at 44 mm, (no old values for comparison), the right ventricle was 34 mm which was slightly increased from 32 mm, and the fractional area change of 30%, showed mildly reduced function which had improved.  The interventricular septum measured 11 on the left ventricular posterior wall 12 mm which are at the upper limits of normal, the left ventricular internal dimension in diastole was 52 and in systole 36 mm giving him measured ejection fraction of 57%.  The ejection fraction is slightly decreased but improved since the previous value.  The pulmonary valve annulus measured 19 mm. There was slight paradoxic motion of the interventricular septum, with otherwise normal-appearing right and left ventricular function.  The tricuspid valve domed in diastole.  The pulmonary valve leaflets could be seen and the pulmonary annulus of 19 mm was probably underestimated.  The right atrium was dilated at 61 x 57 mm.  The left pulmonary artery measured 12 mm and the right pulmonary artery could not be seen.  The aortic arch was left-sided and unobstructed.  The gradient across the tricuspid valve was 9 peak and 4 mean mmHg gradient indicating mild stenosis, unchanged.  There was trivial tricuspid regurgitation with a regurgitant gradient of 24 mmHg suggesting normal right ventricular pressure.  The gradient across the porcine pulmonary valve was 22 peak and 13 mean mmHg showing trivial obstruction, and there was trivial pulmonary insufficiency.  There was mild mitral insufficiency and no residual ventricular septal defect.  An exercise stress test showed it Moriarty exercised 7 minutes and 30 seconds of the Sam protocol, and then stopped due to fatigue and leg cramps.  Baseline blood pressure was 108/77 mmHg and then increased to 159/68 mmHg, which may be an underestimate, before returning to baseline.  Baseline heart rate was 68 bpm and increased to 147 bpm before returning to baseline.  There were  initially T-wave inversions in lead I V5 and V6, which then reverted to upright during exercise, before becoming negative again in recovery.  There were no other ST segment changes or arrhythmias.      At that visit, I felt  that Steven was stable with her complex congenital heart disease.  Her right and left ventricular function had improved.  Her porcine pulmonary valve was functioning well with only mild stenosis and insufficiency.  She had mild tricuspid stenosis with significant right atrial dilatation, also unchanged.  She had left atrial dilatation which I felt was secondary to her mitral insufficiency and perhaps some diastolic dysfunction, also unchanged.  Per her report, her pacemaker was functioning properly and her rhythm was controlled on her current dose of metoprolol.  I requested records from her electrophysiologist. No cardiac intervention was recommended  A CBC, CMP, BNP, and lipid profile were drawn which showed a normal CBC, and normal CMP, a BNP of 82 pg/mL, an elevated total cholesterol of 220 mg/dL, a low HDL cholesterol of 36 mg/dL, normal triglycerides, and an elevated LDL cholesterol of 168 mg/dL.  A chest x-ray was obtained at that visit which showed mild cardiomegaly with normal vascularity, epicardial pacing leads on the right atrium and right ventricle and the AICD patch at the apex.  Sean reported that she is getting  in a few months and desires pregnancy.  This is a routine follow-up.       At her last clinic visit 3 months ago, Sean was not feeling well.  Approximately 3 months prior, while walking on the beach, she had the sudden onset of fatigue and shortness of breath.  He felt her heart was racing but not in an abnormal way.  She also began sweating, but had no chest pain.  Although she sat down and went into air-conditioning, the fatigue, shortness of breath and sweating persisted for another 30 minutes.  Following that, she has noticed an increase in fatigue and  exercise intolerance.  She has not participated in any exercise for that reason.  She states she has the same symptoms as before her last valve replacement.  Sean has also had a very busy life and lots of stresses.  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  She has had increased fatigue with these activities, and is also sleeping more.  Her last pacemaker check was about 9 months ago.  She has had no shocks.  She was recently started on atorvastatin for elevated cholesterol, which was her only medication change.  She was taking metoprolol succinate 50 mg by mouth daily, enalapril 5 mg in the morning and 2.5 mg at night, aspirin 81 mg by mouth daily, atorvastatin 20 mg by mouth daily, Zoloft 100 mg by mouth daily, and venlafaxine 150 mg by mouth daily.  Her exam showed blood pressure in the right arm was 119/76 mmHg.  Pulse was 91 bpm and pulsed oximetry in room air was 98%.  First heart sound was normal and second heart sound was widely split.  There was a grade 2/6 systolic ejection murmur best heard at the left upper sternal border and down the left sternal border.  A diastolic rumble was heard.  The liver was 2 cm below the right costal margin and unchanged.  The pacemaker was palpated in the upper mid abdomen.  Pulses were 2+ bilaterally except for 1+ in the right femoral.  There was no peripheral edema.     EKG showed sinus rhythm at a rate of 73 bpm with first-degree block (386 ms).  T-wave inversions in leads II and aVF, normal ventricular forces, and T wave inversions in leads V1 and V5, with T-wave flattening in V6.  Compared to the previous EKG, it was no change.     An echocardiogram showed the anatomy for tetralogy of Fallot status post complete repair and status post a pulmonary valve replacement.  It should be mentioned that  the heart was difficult to visualize and images were of poor quality chewer attributed to obesity.  The aortic root measured 30 mm, unchanged, left atrium 35 mm, improved, and right ventricle 34 mm, unchanged.  The interventricular septum measured 11 and the left ventricular posterior wall 11 mm both of which were at the upper limits of normal.  The left ventricular internal dimension in diastole was 56 and in systole 43 mm giving a calculated ejection fraction of 46% which has decreased since the previous study.  There was flattening of septal wall motion.  The left ventricular posterior wall bowed posteriorly.  The pulmonary valve leaflets were difficult to see.  The tricuspid valve leaflets were difficult to see, but the tricuspid valve annulus was subjectively smaller than the mitral valve annulus.   Right ventricular function was judged subjectively to be mildly depressed, with a fractional area change measured at 30%, which is within normal limits.   The branch pulmonary arteries could not be imaged.  The aortic arch was left-sided and unobstructed.  The gradient across the tricuspid valve was 10 peak and 5 mean mmHg indicating mild tricuspid stenosis which is unchanged.  The gradient across the bioprosthetic pulmonary valve was 23 peak mmHg, unchanged.  Trivial pulmonary insufficiency.  No tricuspid insufficiency.  No aortic stenosis or aortic insufficiency.  Trivial mitral insufficiency, improved.  No aortic arch obstruction.       The pacemaker was then downloaded by the [employer] representative, and it showed that she is atrially paced 6% of the time with no ventricular pacing.  She had a prolonged AV interval of 370 ms.  Her underlying rhythm was sinus bradycardia with a ventricular rate in the 40s.  She had no abnormal tachycardias greater than 150 bpm.  Her atrial and ventricular thresholds were good.  She had less than one year life on her battery.  We adjusted her pacemaker settings to have rates  detected (monitor zone) greater than 130 bpm for 32 beats in duration.     At that visit,  my impression was Sean had a decrease in exercise tolerance and an increase in fatigue which was cardiac related.  She was difficult to evaluate by transthoracic echocardiography, probably secondary to obesity. I felt  her episode was secondary to an increase in tricuspid stenosis or bioprosthetic pulmonary valve dysfunction, and a decrease in left ventricular function.  Her symptoms may have been exacerbated by the stresses of finishing her residency, moving across the country, starting a new job, and planning a wedding.       Therefore, I began Sean on coenzyme Q 10 100 mg by mouth twice a day, for her left ventricular dysfunction, right ventricular dysfunction, and because she was started on a statin drug.   I presented her in/surgery conference for cardiac catheterization, where her tricuspid valve, pulmonary valve and coronary arteries could be evaluated, and possibly a Serina valve could be implanted and or a tricuspid valve balloon.  At the same time, I wanted her to have a transesophageal echocardiogram since her transthoracic imaging was poor.   I kept her on the same doses of her other medications, enalapril metoprolol, atorvastatin, and aspirin.    Laboratory work which was obtained at that visit which subsequently showed a normal CBC with the exception of a mildly elevated platelet count of 352,000, and a normal CMP.  Of note, her BNP was mildly elevated to  162 pg/mL, compared to the previous value.  A free T4 was normal at 0.9 ng/dL, but her free T3 was decreased at 1.8 pg/mL, although her TSH was normal at 1.4 to micro-units per milliliter.  A cholesterol profile was repeated now that she was on atorvastatin, and that showed the cholesterol had decreased to 179 mg/dL, and the LDL cholesterol had decreased to 123.4 mg/dL, which were now normal, although her HDL remained decreased at 37 mg/dL.  Sean then  saw her internal medicine doctor who began her on thyroid replacement hormone.        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  A catheterization and possible Serina valve implantation has been scheduled for February 6, 2018.  Her wedding was March 10, 2018.  Her work schedule was rather demanding, and she has 7-9 days in-house as a hospitalist, followed by 7 days off.   Sean states was feeling better than she did at her last clinic visit, and in particular had less fatigue.  She was still short of breath with climbing stairs, but has not had any further episodes of sudden onset of shortness of breath, weakness, sweating, palpitations or chest pain.  She had started a diet and light exercise program.  She was taking metoprolol XL 50 mg by mouth daily, enalapril 5 mg in the morning and 2.5 mg at night, aspirin 81 mg by mouth daily, coenzyme Q10 200 mg by mouth twice a day, atorvastatin 20 mg by mouth daily, Synthroid 150 µg by mouth daily, Zoloft 100 mg by mouth daily, and had weaned her Effexor down to 75 mg by mouth daily.     Exam revealed an obese but very pleasant and otherwise healthy appearing young woman in no acute distress.  Height was 175.3 cm and weight was 116.8 kg, a 1 kg weight decrease since her last clinic visit.   First heart sound was normal and second heart sound was widely split.  The was a grade 2/6 systolic ejection murmur heard best at the left upper sternal border, and faintly over the precordium.  Diastole was clear (the previous diastolic rumble ha resolved).  The liver edge was 2 cm below the right costal margin and unchanged.  Pulses were 2+ bilaterally.  There was no peripheral edema.     EKG showed an atrially paced rhythm at a rate of 68 bpm with a prolonged VA interval of 112 ms, with right ventricular conduction  delay and negative T waves in the left precordial leads, unchanged.     Echocardiogram showed the anatomy for status post repair of tetralogy of Fallot and placement of a bioprosthetic valve in the pulmonary position.  The aortic root was mildly dilated at 31 mm, unchanged, the left atrium had increased to 45 mm, and the right ventricle measured 29 mm, unchanged.  The interventricular septum measured 12 and the left ventricular posterior wall 12 mm which were at the upper limits of normal.  The left ventricular internal dimension in diastole was 53 and in systole 37 mm giving a calculated ejection fraction of 58%, which had improved significantly from her previous ejection fraction of 46% 2 months ago.  The right ventricle was not seen well enough quantitate function, but subjectively appeared improved since the previous visit.  They bioprosthetic valve was poorly seen in the pulmonary position, and the leaflets were thick and moving poorly.  The right atrium was significantly dilated at 54 x 53 mm.  The tricuspid valve was not seen well enough to measure the annulus.  pulmonary arteries could not be imaged.  The aortic arch was left-sided  The gradient across the tricuspid valve was 11 peak and 5 mean millimeters of mercury indicating mild stenosis which was unchanged.  The gradient across the right ventricular outflow tract was 27 mmHg peak.  There was mild pulmonary insufficiency. There was trivial tricuspid insufficiency which was inadequate to estimate right ventricular pressures.       Sean was evaluated by Dr. Chintan Cabrera, who felt that Sean did not have any normal tachycardia arrhythmias.  Her defibrillator, was approaching end-of-life.  Please see Dr. Cabrera's evaluation for more details.  He recommended that Sean receive monthly pacemaker/AICD evaluations to determine the best time for replacement.     My impression at that visit was Sean's left ventricular function had improved  significantly since being placed on coenzyme every 10 and thyroid replacement hormone.  I believed this had led to a decrease in symptoms and an improvement in energy level.  I continued to feel, however, that Sean still required a transesophageal echocardiogram and a cardiac catheterization and possible Serina valve replacement.  I did feel, however, that this could be delayed until after her wedding, to avoid the unlikely chance of a complication that might impact her wedding.   My opinion was that waiting an additional month to perform the catheterization would not adversely affect her health.  I discussed this with Sean and her fiancé, and they were in agreement.     Sean expressed the desire to undergo pregnancy at some time in the future.  I recommended she have her cardiac catheterization and possible Serina valve implantation, and also her AICD generator change, before becoming pregnant.  I continued the same doses of her medications.   SBE prophylaxis was recommended for dental and surgical procedures.  This is a follow up visit.    Since her last clinic visit one month ago, Sean states that she was generally feeling well except for 1 episode.  She was working out with her  in the morning, and had not yet eaten breakfast, when she had an episode of tachycardia, shortness of breath and nausea.  At last about 5 minutes after stopping the exercise period she has had no recurrences since, even though she continues to work out with her .  Other than that she feels she is doing well.  She participates in all activities including working as a hospitalist, and denies chest pain, palpitations and shortness of breath and swelling.  She has postponed her cardiac catheterization and possible Serina valve placement until after the wedding.    Sean is currently taking metoprolol 50 mg by mouth daily, enalapril 5 mg in the morning and 2.5 mg in the evening, aspirin 81 mg by mouth daily,  coenzyme Q 10 100 mg by mouth twice a day, Synthroid 150 µg by mouth daily, and Zoloft 100 mg by mouth daily.  She has been weaned off her Effexor.    Physical exam revealed an obese, pleasant and healthy-appearing woman in no acute distress.  Vital signs showed a height of 175.3 cm or 5 feet 9 inches and a weight of 119 kg or 262 lbs. 7 oz., which is a weight increase of 2.2 kg since her last clinic visit.  Blood pressure in the right arm was 107/73 mmHg, pulse 87 bpm, and pulsed oximetry in room air 98%.    Examination of the skin showed it to be pink and well perfused.  The lungs were clear.  Palpation of the precordium showed it to be normal with a well-healed midline scar, and a pacemaker palpated in the abdomen.   the liver edge was 2 cm below the right costal margin, unchanged.  Pulses were 2+ bilaterally.  There was no peripheral edema.    EKG was obtained which showed a paced atrial rhythm at 78 bpm, with right ventricular conduction delay, and T-wave inversions in the limband precordial leads suggestive of ischemia, unchanged.    Echocardiogram showed evidence for repair of tetralogy of Fallot status post pulmonary valve replacement.  Two-dimensional imaging was poor.  M-mode parameters were as follows: The aortic root measured 30 mm, left atrium 43 mm, right ventricle 26 mm, interventricular septum 12 and left ventricular posterior wall 12 mm, the left ventricular internal dimension in diastole was 56 and in systole 36 mm giving a calculated ejection fraction of 55% and these numbers are normal.  The right ventricle function was judged subjectively to be reduced but unable to be quantitated The bioprosthetic valve in the pulmonary position was difficult to see, however thickened leaflets with poor movement were observed.  The aortic arch was left-sided and unobstructed.  The superior vena cava drains normally to the right atrium.  Doppler analysis using pulsed continuous-wave and color-flow:  The there  was turbulence across the tricuspid valve with a gradient of 14 peak and 10 mean millimeters of mercury indicating mild tricuspid valve stenosis which was unchanged.  Mitral insufficiency was present which was mild.  The gradient across the bioprosthetic pulmonary valve was 32 mmHg peak in the setting of reduced right ventricular function.  No residual ventriculoseptal defect.  No aortic stenosis or aortic insufficiency.  Impression: Status post repair of tetralogy of Fallot.  Poor two-dimensional imaging.  Decreased right ventricular function.  Bioprosthetic pulmonary valve with thickened and poorly moving leaflets, and a 32 mmHg peak gradient across it in the setting of reduced right ventricular function.  Mild tricuspid valve stenosis with a 14 peak and 10 mean millimeter gradient across it, unchanged.    Sean was then evaluated by electrophysiologist Dr. Chintan Cabrera, performed a download of her pacemaker, and that showed that there was a 6 beat run of ventricular tachycardia which resolved spontaneously, and that the pacemaker generator close to end-of-life.    It is my impression that Sean Baez has right ventricular dysfunction and pulmonary valve dysfunction, in particular pulmonary valve stenosis.  She requires a pulmonary valve replacement which hopefully can be performed with a Serina valve.  I believe her right ventricular function will improve after that.  She also has ventricular tachycardia, but it was self-limited and she is protected by both her metoprolol and her AICD.  The generator also requires replacement.    Therefore, Sean undergo replacement of her AICD/pacemaker generator on April 4, 2018, and have her pulmonary valve replaced about 3 or 4 weeks later.  Dr. Cabrera has recommended the metoprolol be increased to 50 mg by mouth twice a day.  Sean should then return to this clinic following her AICD placement, which will be in about 2 months, with a chest x-ray, EKG and  echocardiogram.  She will increase her metoprolol to 50 mg by mouth twice a day, and stay on the same doses of her other medications.  SBE prophylaxis is in order for dental and surgical procedures.    Thank you very much for allowing up to participate the care of your patient.       Sincerely      Chantel Saleh

## 2018-02-02 ENCOUNTER — TELEPHONE (OUTPATIENT)
Dept: PEDIATRIC CARDIOLOGY | Facility: CLINIC | Age: 33
End: 2018-02-02

## 2018-02-02 NOTE — TELEPHONE ENCOUNTER
"----- Message from Meagan Suazo RN sent at 2/2/2018 11:25 AM CST -----  Contact: Self [phone]  I didn't call her. She needs a cath at the end of March though.   ----- Message -----  From: Herbert Kenyno  Sent: 2/2/2018  10:55 AM  To: Ayden Gray "[facility]" Staff    Patient is returning a missed call. Not sure if Meagan or Aparna reached out to the patient. She states that she had two missed calls.     "

## 2018-02-02 NOTE — TELEPHONE ENCOUNTER
----- Message from Lula Goncalves RN sent at 1/12/2018  2:38 PM CST -----      ----- Message -----  From: Chantel Saleh MD  Sent: 1/12/2018   2:13 PM  To: NATHAN Gee    I would like to postpone Sean Howard's catheterization, possible Serina and SHALINI until after she gets back from her honeymoon.  I saw her yesterday and her left ventricular function and symptoms have improved significantly since being placed on coenzyme Q 10 and Synthroid.  Her left ventricular ejection fraction has increased to 58%.  She had no arrhythmias on her evaluation by Chintan Cabrera.  I now feel that she is stable enough to wait one extra month, and the procedure is close enough to her wedding date that on the that if on the outside chance she has a complication that it might negatively impact her wedding.  She will be back from her honeymoon towards the end of March.    Thanks.

## 2018-02-02 NOTE — TELEPHONE ENCOUNTER
Spoke to Ms. Greco regarding scheduling cardiac cath procedure. Pt stated that she has to schedule her pacemaker procedure prior to her cath procedure and requests to do cath procedure in April. Offered April 26th. Pt agreed and pre-procedural instructions given and pt stated understanding. Letter placed in the mail with new date. Dr. Carpenter updated.

## 2018-02-05 RX ORDER — METOPROLOL TARTRATE 50 MG/1
50 TABLET ORAL 2 TIMES DAILY
Qty: 62 TABLET | Refills: 4 | Status: SHIPPED | OUTPATIENT
Start: 2018-02-05 | End: 2019-02-14 | Stop reason: SDUPTHER

## 2018-04-02 ENCOUNTER — TELEPHONE (OUTPATIENT)
Dept: PEDIATRIC CARDIOLOGY | Facility: CLINIC | Age: 33
End: 2018-04-02

## 2018-04-02 NOTE — TELEPHONE ENCOUNTER
Spoke with Sean. Instructed her to pl;an to stay overnight but that there is a chance she could go home same day.

## 2018-04-02 NOTE — TELEPHONE ENCOUNTER
----- Message from Mare Ibarra sent at 4/2/2018  8:19 AM CDT -----  Contact: [phone] patient  Patient is scheduled on 04/04/2018 for a procedure she ask if she have to stay overnight? Please call to advise. Thanks.

## 2018-04-03 ENCOUNTER — ANESTHESIA EVENT (OUTPATIENT)
Dept: MEDSURG UNIT | Facility: HOSPITAL | Age: 33
End: 2018-04-03
Payer: COMMERCIAL

## 2018-04-04 ENCOUNTER — HOSPITAL ENCOUNTER (OUTPATIENT)
Dept: RADIOLOGY | Facility: HOSPITAL | Age: 33
Discharge: HOME | End: 2018-04-04
Attending: PEDIATRICS | Admitting: PEDIATRICS
Payer: COMMERCIAL

## 2018-04-04 ENCOUNTER — ANESTHESIA (OUTPATIENT)
Dept: MEDSURG UNIT | Facility: HOSPITAL | Age: 33
End: 2018-04-04
Payer: COMMERCIAL

## 2018-04-04 ENCOUNTER — HOSPITAL ENCOUNTER (OUTPATIENT)
Facility: HOSPITAL | Age: 33
Discharge: HOME | End: 2018-04-04
Attending: PEDIATRICS | Admitting: PEDIATRICS
Payer: COMMERCIAL

## 2018-04-04 VITALS
DIASTOLIC BLOOD PRESSURE: 61 MMHG | HEART RATE: 64 BPM | SYSTOLIC BLOOD PRESSURE: 116 MMHG | BODY MASS INDEX: 36.73 KG/M2 | HEIGHT: 69 IN | TEMPERATURE: 98 F | OXYGEN SATURATION: 100 % | RESPIRATION RATE: 16 BRPM | WEIGHT: 248 LBS

## 2018-04-04 DIAGNOSIS — Z95.2 S/P PULMONARY VALVE REPLACEMENT: ICD-10-CM

## 2018-04-04 DIAGNOSIS — T82.198S OTHER MECHANICAL COMPLICATION OF OTHER CARDIAC ELECTRONIC DEVICE, SEQUELA: ICD-10-CM

## 2018-04-04 DIAGNOSIS — Q24.9 ADULT CONGENITAL HEART DISEASE: ICD-10-CM

## 2018-04-04 DIAGNOSIS — I49.5 SINUS NODE DYSFUNCTION: ICD-10-CM

## 2018-04-04 DIAGNOSIS — Z45.02 ICD (IMPLANTABLE CARDIOVERTER-DEFIBRILLATOR) BATTERY DEPLETION: ICD-10-CM

## 2018-04-04 DIAGNOSIS — I47.20 VT (VENTRICULAR TACHYCARDIA): ICD-10-CM

## 2018-04-04 DIAGNOSIS — Z87.74 TETRALOGY OF FALLOT S/P REPAIR: ICD-10-CM

## 2018-04-04 DIAGNOSIS — Z95.810 AICD (AUTOMATIC CARDIOVERTER/DEFIBRILLATOR) PRESENT: Primary | ICD-10-CM

## 2018-04-04 DIAGNOSIS — Z95.810 AICD (AUTOMATIC CARDIOVERTER/DEFIBRILLATOR) PRESENT: ICD-10-CM

## 2018-04-04 LAB
B-HCG UR QL: NEGATIVE
CTP QC/QA: YES

## 2018-04-04 PROCEDURE — D9220A PRA ANESTHESIA: Mod: CRNA,,, | Performed by: NURSE ANESTHETIST, CERTIFIED REGISTERED

## 2018-04-04 PROCEDURE — D9220A PRA ANESTHESIA: Mod: ANES,,, | Performed by: ANESTHESIOLOGY

## 2018-04-04 PROCEDURE — 81025 URINE PREGNANCY TEST: CPT | Performed by: PEDIATRICS

## 2018-04-04 PROCEDURE — 37000009 HC ANESTHESIA EA ADD 15 MINS: Performed by: PEDIATRICS

## 2018-04-04 PROCEDURE — 63600175 PHARM REV CODE 636 W HCPCS: Performed by: NURSE ANESTHETIST, CERTIFIED REGISTERED

## 2018-04-04 PROCEDURE — 27201642 EP LAB PROCEDURE

## 2018-04-04 PROCEDURE — 33263 RMVL & RPLCMT DFB GEN 2 LEAD: CPT | Mod: ,,, | Performed by: PEDIATRICS

## 2018-04-04 PROCEDURE — 63600175 PHARM REV CODE 636 W HCPCS

## 2018-04-04 PROCEDURE — 63600175 PHARM REV CODE 636 W HCPCS: Performed by: PEDIATRICS

## 2018-04-04 PROCEDURE — 25000003 PHARM REV CODE 250

## 2018-04-04 PROCEDURE — 37000008 HC ANESTHESIA 1ST 15 MINUTES: Performed by: PEDIATRICS

## 2018-04-04 PROCEDURE — 74018 RADEX ABDOMEN 1 VIEW: CPT | Mod: TC,FY

## 2018-04-04 PROCEDURE — 93005 ELECTROCARDIOGRAM TRACING: CPT

## 2018-04-04 PROCEDURE — 93010 ELECTROCARDIOGRAM REPORT: CPT | Mod: ,,, | Performed by: INTERNAL MEDICINE

## 2018-04-04 PROCEDURE — 25000003 PHARM REV CODE 250: Performed by: PEDIATRICS

## 2018-04-04 PROCEDURE — 74018 RADEX ABDOMEN 1 VIEW: CPT | Mod: 26,,, | Performed by: RADIOLOGY

## 2018-04-04 PROCEDURE — 25000003 PHARM REV CODE 250: Performed by: NURSE ANESTHETIST, CERTIFIED REGISTERED

## 2018-04-04 RX ORDER — CEFAZOLIN SODIUM 1 G/3ML
2 INJECTION, POWDER, FOR SOLUTION INTRAMUSCULAR; INTRAVENOUS
Status: COMPLETED | OUTPATIENT
Start: 2018-04-04 | End: 2018-04-04

## 2018-04-04 RX ORDER — PROPOFOL 10 MG/ML
INJECTION, EMULSION INTRAVENOUS CONTINUOUS PRN
Status: DISCONTINUED | OUTPATIENT
Start: 2018-04-04 | End: 2018-04-04

## 2018-04-04 RX ORDER — HYDROCODONE BITARTRATE AND ACETAMINOPHEN 5; 325 MG/1; MG/1
1 TABLET ORAL EVERY 4 HOURS PRN
Status: DISCONTINUED | OUTPATIENT
Start: 2018-04-04 | End: 2018-04-04 | Stop reason: HOSPADM

## 2018-04-04 RX ORDER — FENTANYL CITRATE 50 UG/ML
INJECTION, SOLUTION INTRAMUSCULAR; INTRAVENOUS
Status: DISCONTINUED | OUTPATIENT
Start: 2018-04-04 | End: 2018-04-04

## 2018-04-04 RX ORDER — IBUPROFEN 200 MG
400 TABLET ORAL EVERY 6 HOURS PRN
Start: 2018-04-04 | End: 2018-04-11

## 2018-04-04 RX ORDER — ONDANSETRON 2 MG/ML
INJECTION INTRAMUSCULAR; INTRAVENOUS
Status: DISCONTINUED | OUTPATIENT
Start: 2018-04-04 | End: 2018-04-04

## 2018-04-04 RX ORDER — SODIUM CHLORIDE 0.9 % (FLUSH) 0.9 %
3 SYRINGE (ML) INJECTION
Status: DISCONTINUED | OUTPATIENT
Start: 2018-04-04 | End: 2018-04-04 | Stop reason: HOSPADM

## 2018-04-04 RX ORDER — SODIUM CHLORIDE 9 MG/ML
INJECTION, SOLUTION INTRAVENOUS CONTINUOUS PRN
Status: DISCONTINUED | OUTPATIENT
Start: 2018-04-04 | End: 2018-04-04

## 2018-04-04 RX ORDER — GLYCOPYRROLATE 0.2 MG/ML
INJECTION INTRAMUSCULAR; INTRAVENOUS
Status: DISCONTINUED | OUTPATIENT
Start: 2018-04-04 | End: 2018-04-04

## 2018-04-04 RX ORDER — HYDROCODONE BITARTRATE AND ACETAMINOPHEN 5; 325 MG/1; MG/1
1 TABLET ORAL EVERY 6 HOURS PRN
Qty: 12 TABLET | Refills: 0 | Status: ON HOLD | OUTPATIENT
Start: 2018-04-04 | End: 2018-04-26 | Stop reason: HOSPADM

## 2018-04-04 RX ORDER — MIDAZOLAM HYDROCHLORIDE 1 MG/ML
INJECTION, SOLUTION INTRAMUSCULAR; INTRAVENOUS
Status: DISCONTINUED | OUTPATIENT
Start: 2018-04-04 | End: 2018-04-04

## 2018-04-04 RX ORDER — CEPHALEXIN 250 MG/1
250 CAPSULE ORAL EVERY 8 HOURS
Qty: 21 CAPSULE | Refills: 0 | Status: SHIPPED | OUTPATIENT
Start: 2018-04-04 | End: 2018-04-11

## 2018-04-04 RX ORDER — LIDOCAINE HCL/PF 100 MG/5ML
SYRINGE (ML) INTRAVENOUS
Status: DISCONTINUED | OUTPATIENT
Start: 2018-04-04 | End: 2018-04-04

## 2018-04-04 RX ORDER — ACETAMINOPHEN 325 MG/1
650 TABLET ORAL EVERY 4 HOURS PRN
Status: DISCONTINUED | OUTPATIENT
Start: 2018-04-04 | End: 2018-04-04 | Stop reason: HOSPADM

## 2018-04-04 RX ADMIN — FENTANYL CITRATE 25 MCG: 50 INJECTION, SOLUTION INTRAMUSCULAR; INTRAVENOUS at 11:04

## 2018-04-04 RX ADMIN — FENTANYL CITRATE 25 MCG: 50 INJECTION, SOLUTION INTRAMUSCULAR; INTRAVENOUS at 10:04

## 2018-04-04 RX ADMIN — GLYCOPYRROLATE 0.2 MG: 0.2 INJECTION, SOLUTION INTRAMUSCULAR; INTRAVENOUS at 08:04

## 2018-04-04 RX ADMIN — FENTANYL CITRATE 25 MCG: 50 INJECTION, SOLUTION INTRAMUSCULAR; INTRAVENOUS at 09:04

## 2018-04-04 RX ADMIN — SODIUM CHLORIDE: 0.9 INJECTION, SOLUTION INTRAVENOUS at 08:04

## 2018-04-04 RX ADMIN — HYDROCODONE BITARTRATE AND ACETAMINOPHEN 1 TABLET: 5; 325 TABLET ORAL at 03:04

## 2018-04-04 RX ADMIN — ONDANSETRON 4 MG: 2 INJECTION, SOLUTION INTRAMUSCULAR; INTRAVENOUS at 10:04

## 2018-04-04 RX ADMIN — FENTANYL CITRATE 25 MCG: 50 INJECTION, SOLUTION INTRAMUSCULAR; INTRAVENOUS at 08:04

## 2018-04-04 RX ADMIN — CEFAZOLIN 2 G: 1 INJECTION, POWDER, FOR SOLUTION INTRAMUSCULAR; INTRAVENOUS at 02:04

## 2018-04-04 RX ADMIN — CEFAZOLIN 2 G: 330 INJECTION, POWDER, FOR SOLUTION INTRAMUSCULAR; INTRAVENOUS at 08:04

## 2018-04-04 RX ADMIN — MIDAZOLAM 2 MG: 1 INJECTION INTRAMUSCULAR; INTRAVENOUS at 08:04

## 2018-04-04 RX ADMIN — PROPOFOL 100 MCG/KG/MIN: 10 INJECTION, EMULSION INTRAVENOUS at 08:04

## 2018-04-04 RX ADMIN — LIDOCAINE HYDROCHLORIDE 50 MG: 20 INJECTION, SOLUTION INTRAVENOUS at 08:04

## 2018-04-04 NOTE — TRANSFER OF CARE
"Anesthesia Transfer of Care Note    Patient: Sean Baez    Procedure(s) Performed: Procedure(s) (LRB):  REPLACEMENT-GENERATOR-ICD (N/A)    Patient location: PACU    Anesthesia Type: general    Transport from OR: Transported from OR on 6-10 L/min O2 by face mask with adequate spontaneous ventilation    Post pain: adequate analgesia    Post assessment: tolerated procedure well and no apparent anesthetic complications    Post vital signs: stable    Level of consciousness: awake, alert and oriented    Nausea/Vomiting: no nausea/vomiting    Complications: none    Transfer of care protocol was followed      Last vitals:   Visit Vitals  /66 (BP Location: Left arm, Patient Position: Lying)   Pulse 71   Temp 36.7 °C (98 °F) (Oral)   Resp 20   Ht 5' 9" (1.753 m)   Wt 112.5 kg (248 lb)   LMP  (Within Weeks)   SpO2 97%   Breastfeeding? No   BMI 36.62 kg/m²     "

## 2018-04-04 NOTE — ANESTHESIA PREPROCEDURE EVALUATION
04/04/2018  Sean Baez is a 33 y.o., female.    Anesthesia Evaluation    I have reviewed the Patient Summary Reports.    I have reviewed the Nursing Notes.   I have reviewed the Medications.     Review of Systems  Anesthesia Hx:  No problems with previous Anesthesia  History of prior surgery of interest to airway management or planning: Denies Family Hx of Anesthesia complications.   Denies Personal Hx of Anesthesia complications.   Hematology/Oncology:  Hematology Normal   Oncology Normal     EENT/Dental:EENT/Dental Normal   Cardiovascular:   Pacemaker TOF, cardiology notes and studies reviewed   Pulmonary:  Pulmonary Normal    Renal/:  Renal/ Normal     Hepatic/GI:  Hepatic/GI Normal    Neurological:  Neurology Normal    Endocrine:   Hypothyroidism Morbid obesity       Physical Exam  General:  Well nourished, Morbid Obesity    Airway/Jaw/Neck:  Airway Findings: Mouth Opening: Normal Tongue: Normal  General Airway Assessment: Adult  Mallampati: I  TM Distance: Normal, at least 6 cm  Jaw/Neck Findings:  Neck ROM: Normal ROM      Dental:  Dental Findings: In tact   Chest/Lungs:  Chest/Lungs Findings: Clear to auscultation, Normal Respiratory Rate     Heart/Vascular:  Heart Findings: Rate: Normal  Rhythm: Regular Rhythm  Sounds: Normal        Mental Status:  Mental Status Findings:  Cooperative, Alert and Oriented         Anesthesia Plan  Type of Anesthesia, risks & benefits discussed:  Anesthesia Type:  general  Patient's Preference:   Intra-op Monitoring Plan:   Intra-op Monitoring Plan Comments:   Post Op Pain Control Plan:   Post Op Pain Control Plan Comments:   Induction:   IV  Beta Blocker:  Patient is not currently on a Beta-Blocker (No further documentation required).       Informed Consent: Patient understands risks and agrees with Anesthesia plan.  Questions answered. Anesthesia  consent signed with patient.  ASA Score: 3     Day of Surgery Review of History & Physical:    H&P update referred to the surgeon.         Ready For Surgery From Anesthesia Perspective.

## 2018-04-04 NOTE — ANESTHESIA POSTPROCEDURE EVALUATION
"Anesthesia Post Evaluation    Patient: Sean Baez    Procedure(s) Performed: Procedure(s) (LRB):  REPLACEMENT-GENERATOR-ICD (N/A)    Final Anesthesia Type: general  Patient location during evaluation: PACU  Patient participation: Yes- Able to Participate  Level of consciousness: awake and alert  Post-procedure vital signs: reviewed and stable  Pain management: adequate  Airway patency: patent  PONV status at discharge: No PONV  Anesthetic complications: no      Cardiovascular status: blood pressure returned to baseline  Respiratory status: unassisted, spontaneous ventilation and room air  Hydration status: euvolemic  Follow-up not needed.        Visit Vitals  BP (!) 113/59   Pulse 66   Temp 36.9 °C (98.4 °F) (Skin)   Resp 18   Ht 5' 9" (1.753 m)   Wt 112.5 kg (248 lb)   LMP  (Within Weeks)   SpO2 99%   Breastfeeding? No   BMI 36.62 kg/m²       Pain/Carlos Score: Pain Assessment Performed: Yes (4/4/2018 12:20 PM)  Presence of Pain: denies (4/4/2018 12:20 PM)  Carlos Score: 10 (4/4/2018 12:20 PM)      "

## 2018-04-04 NOTE — PROGRESS NOTES
Pt returned to unit AAOx4 and denies pain.  Dressing to upper adbdomen is c/d/i and ice pack is in place above site.  VSS.  Post procedure protocol reviewed.  Family called to bedside.  Will continue to monitor.

## 2018-04-04 NOTE — H&P
Thank you for referring your patient Sean Baez to the electrophysiology service for ICD generator change. The patient is accompanied by her mother and . Please review my findings below.    CHIEF COMPLAINT: H&P for ICD generator change    HISTORY OF PRESENT ILLNESS:   33 y/o female with TOF s/p repair.  She had most recent pulmonary valve replacement in 2005 and placement of epicardial dual chamber ICD with epicardial patch for sinus node dysfunction and VT. She had AICD gen change in 2011 for VT.   Seen in November 2017 by Dr. Saleh.  Still had TS but unchanged.  LV dilated and EF down to 46%.  Valve looked ok by report.  Changed rate detection to 130.   She was started on CoQ10.  History of inappropriate shock x 2 in 2005 but none since.  She was feeling very fatigued in November.  In the interim she was started on thyroid medication and CoQ10 and reports having markedly increased energy.  She had episode of sweating and really marked fatigue in August with going up steep incline and walking in sand.  She was started on thyroid medication     Sean was last seen by me in February 2018.  She was at elective replacement.  She returns today for ICD generator change.  She has occasional fatigue and swelling a little more lately than she has previously.  She has no interval fever or recent infection by report.  She has no palpitations noted.  She does have shortness of breath with exertion.    REVIEW OF SYSTEMS:     GENERAL: No fever, chills, fatigability or weight loss.  SKIN: No rashes, itching or changes in color or texture of skin.  CHEST: see HPI  CARDIOVASCULAR: see HPI  ABDOMEN: Appetite fine. No weight loss. Denies diarrhea, abdominal pain, or vomiting.  PERIPHERAL VASCULAR: No claudication or cyanosis.  MUSCULOSKELETAL: No joint stiffness or swelling.   NEUROLOGIC: No history of seizures,  alteration of gait or coordination.    PAST MEDICAL HISTORY:   Past Medical History:  "  Diagnosis Date    Sinus node dysfunction     TOF (tetralogy of Fallot)     V-tach            FAMILY HISTORY:   Family History   Problem Relation Age of Onset    Hypertension Mother     Hyperlipidemia Mother     Hypertension Father     Heart attacks under age 50 Maternal Grandmother     Stroke Maternal Grandmother     Stroke Maternal Grandfather     COPD Paternal Grandfather          SOCIAL HISTORY:   Social History     Social History    Marital status: Single     Spouse name: N/A    Number of children: N/A    Years of education: N/A     Occupational History    Not on file.     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        ALLERGIES:  Review of patient's allergies indicates:  No Known Allergies    MEDICATIONS:    Current Facility-Administered Medications:     ceFAZolin injection 2 g, 2 g, Intravenous, On Call Procedure, Chintan Cabrera MD      PHYSICAL EXAM:   Vitals:    04/04/18 0629 04/04/18 0630   BP: (!) 106/55 122/66   BP Location: Right arm Left arm   Patient Position: Lying Lying   Pulse:  71   Resp:  20   Temp:  98 °F (36.7 °C)   TempSrc:  Oral   SpO2:  97%   Weight: 112.5 kg (248 lb)    Height: 5' 9" (1.753 m)          GENERAL: Awake, well-developed well-nourished, no apparent distress  HEENT: mucous membranes moist and pink, normocephalic atraumatic, no cranial or carotid bruits, sclera anicteric  NECK: no jugular venous distention, no lymphadenopathy  CHEST: Good air movement, clear to auscultation bilaterally  CARDIOVASCULAR: Quiet precordium, regular rate and rhythm, S1S2, no rubs or gallops, 2/6 Systolic murmur LUSB noted.  ABDOMEN: Soft, nontender nondistended, no hepatomegaly, no aortic bruits  EXTREMITIES: Warm well perfused, " 2+ radial/pedal pulses, capillary refill 2 seconds, no clubbing, cyanosis, or edema  NEURO: Alert and oriented, cooperative with exam, face symmetric, moves all extremities well      ASSESSMENT:  34 y/o female with TOF s/p repair and tricuspid stenosis with ICD now at elective replacement.    I reviewed the risks and benefits of ICD generator change at length.  I explained that we have surgical back up available in case the generator change requires manipulation of rib cage in order to access generator.  Her questions were answered and she expressed understanding.  Informed consent was obtained and signed by patient.      PLAN:   ICD generator change with sedation by anesthesia service.        I hope this brings you up-to-date on Sean Baez  Please contact me with any questions or concerns.    Chintan Cabrera MD  Pediatric and Adult Congenital Electrophysiologist  Pediatric Cardiologist

## 2018-04-04 NOTE — PROGRESS NOTES
Pt DC'd per MD order. Discharge instructions given including activity,  wound care, S&S of infections, future appointments, and when to call MD. Medications reviewed including drug name, indication, dosage, frequency, and when to take next dose. Prescriptions given to patient. Telemetry and PIV DC'd, catheter tip intact. Pt left unit via wheelchair with family.

## 2018-04-04 NOTE — NURSING TRANSFER
Nursing Transfer Note      4/4/2018     Transfer To: short stay #3    Transfer via stretcher    Transfer with cardiac monitoring    Transported by RN    Medicines sent: no    Chart send with patient: Yes    Notified: spouse    Patient reassessed at: 4/4/18@1220hrs

## 2018-04-06 ENCOUNTER — CONFERENCE (OUTPATIENT)
Dept: PEDIATRIC CARDIOLOGY | Facility: CLINIC | Age: 33
End: 2018-04-06

## 2018-04-06 NOTE — PROGRESS NOTES
Sean Baez underwent  ICD generator change out on 04/04/2018. .Her case was reviewed in our [facility] on 04/06/2018. She should follow up with Dr. Cabrera on 4/19/2018 at [facility].

## 2018-04-06 NOTE — OP NOTE
[facility] [place], [place]    OPERATIVE NOTE         Patient Name: Sean Baez   Medical Record Number: [id number]   Date of Operation: 4/4/18   Diagnoses: Automatic Defibrillator change out (by Dr. Cabrera)  Procedure: Pacemaker/Defibrillator pocket relocation  Surgeon: Dr. Ewrin Guy       DESCRIPTION OF PROCEDURE:     I was called to the EP suite by Dr. Cabrera who was in the process of an ICD change-out in her abdominal pocket. Dr. Cabrera had identified that the generator was below the rectus, but when he divided the midline at that point, he encountered what he was concerned might be omentum and that he was in the abdominal cavity, so I was called to assist.  Upon inspection of the wound, I found preperitoneal fat and was able to dissect down and find the generator which was posterior to the fascia and partially herniated into the peritoneum.  I was able to remove the generator for Dr. Cabrera to change the device, and then I repaired the abdomen by re-approximating the rectus muscle and fascia with a running #1 Vicryl suture.  I then created a new pocket for the new device that was above the fascia that was the appropriate size for the new device, and Dr. Cabrera then completed the procedure, including the wound closure.            FINAL DIAGNOSIS: Pacemaker/Defibrillator Pocket complication.          Erwin Guy MD FACS

## 2018-04-10 ENCOUNTER — TELEPHONE (OUTPATIENT)
Dept: PEDIATRIC CARDIOLOGY | Facility: CLINIC | Age: 33
End: 2018-04-10

## 2018-04-10 DIAGNOSIS — I47.20 VT (VENTRICULAR TACHYCARDIA): Primary | ICD-10-CM

## 2018-04-10 DIAGNOSIS — I49.5 SINUS NODE DYSFUNCTION: ICD-10-CM

## 2018-04-10 NOTE — TELEPHONE ENCOUNTER
Left message for patient to call to reschedule wound check. Scheduled appointment in computer  For 4/13.

## 2018-04-13 ENCOUNTER — OFFICE VISIT (OUTPATIENT)
Dept: PEDIATRIC CARDIOLOGY | Facility: CLINIC | Age: 33
End: 2018-04-13
Payer: COMMERCIAL

## 2018-04-13 ENCOUNTER — CLINICAL SUPPORT (OUTPATIENT)
Dept: PEDIATRIC CARDIOLOGY | Facility: CLINIC | Age: 33
End: 2018-04-13
Payer: COMMERCIAL

## 2018-04-13 VITALS
OXYGEN SATURATION: 99 % | BODY MASS INDEX: 62.6 KG/M2 | WEIGHT: 270.5 LBS | HEIGHT: 55 IN | DIASTOLIC BLOOD PRESSURE: 53 MMHG | SYSTOLIC BLOOD PRESSURE: 99 MMHG | HEART RATE: 71 BPM

## 2018-04-13 DIAGNOSIS — Z95.810 AICD (AUTOMATIC CARDIOVERTER/DEFIBRILLATOR) PRESENT: ICD-10-CM

## 2018-04-13 DIAGNOSIS — I47.20 VT (VENTRICULAR TACHYCARDIA): ICD-10-CM

## 2018-04-13 DIAGNOSIS — Q24.9 ADULT CONGENITAL HEART DISEASE: ICD-10-CM

## 2018-04-13 DIAGNOSIS — I49.5 SINUS NODE DYSFUNCTION: ICD-10-CM

## 2018-04-13 DIAGNOSIS — I47.20 VT (VENTRICULAR TACHYCARDIA): Primary | ICD-10-CM

## 2018-04-13 DIAGNOSIS — Z87.74 TETRALOGY OF FALLOT S/P REPAIR: ICD-10-CM

## 2018-04-13 DIAGNOSIS — Z45.02 ICD (IMPLANTABLE CARDIOVERTER-DEFIBRILLATOR) BATTERY DEPLETION: ICD-10-CM

## 2018-04-13 DIAGNOSIS — Z95.810 ICD (IMPLANTABLE CARDIOVERTER-DEFIBRILLATOR) IN PLACE: ICD-10-CM

## 2018-04-13 DIAGNOSIS — Z95.2 S/P PULMONARY VALVE REPLACEMENT: ICD-10-CM

## 2018-04-13 PROCEDURE — 99024 POSTOP FOLLOW-UP VISIT: CPT | Mod: S$GLB,,, | Performed by: PEDIATRICS

## 2018-04-13 PROCEDURE — 93283 PRGRMG EVAL IMPLANTABLE DFB: CPT | Mod: S$GLB,,, | Performed by: PEDIATRICS

## 2018-04-13 PROCEDURE — 93000 ELECTROCARDIOGRAM COMPLETE: CPT | Mod: S$GLB,,, | Performed by: PEDIATRICS

## 2018-04-13 PROCEDURE — 99999 PR PBB SHADOW E&M-EST. PATIENT-LVL III: CPT | Mod: PBBFAC,,, | Performed by: PEDIATRICS

## 2018-04-13 RX ORDER — FUROSEMIDE 20 MG/1
TABLET ORAL
Refills: 6 | COMMUNITY
Start: 2018-04-10 | End: 2018-06-14 | Stop reason: DRUGHIGH

## 2018-04-13 RX ORDER — EPINEPHRINE 0.22MG
100 AEROSOL WITH ADAPTER (ML) INHALATION
Status: ON HOLD | COMMUNITY
End: 2018-04-26 | Stop reason: SDUPTHER

## 2018-04-13 NOTE — DISCHARGE SUMMARY
[facility]  Discharge Note  Short Stay    Admit Date: 4/4/2018    Discharge Date and Time: 4/4/2018  3:36 PM     Attending Physician: Chintan Cabrera    Discharge Provider: Chintan Cabrera    Diagnoses:  Active Hospital Problems    Diagnosis  POA    ICD (implantable cardioverter-defibrillator) battery depletion [Z45.02]  Not Applicable      Resolved Hospital Problems    Diagnosis Date Resolved POA   No resolved problems to display.       Discharged Condition: good    Hospital Course: Patient was admitted for an outpatient procedure and tolerated the procedure well with no complications.    Final Diagnoses: Same as principal problem.    Disposition: Home or Self Care    Follow up/Patient Instructions:    Medications:  Reconciled Home Medications:      Medication List      START taking these medications    hydrocodone-acetaminophen 5-325mg 5-325 mg per tablet  Commonly known as:  NORCO  Take 1 tablet by mouth every 6 (six) hours as needed for Pain.        CONTINUE taking these medications    aspirin 81 MG EC tablet  Commonly known as:  ECOTRIN  Take 81 mg by mouth 4 (four) times daily.     atorvastatin 20 MG tablet  Commonly known as:  LIPITOR  Take 20 mg by mouth once daily.     coQ10 (ubiquinol) 100 mg Cap  Take 100 mg by mouth 2 (two) times daily.     enalapril 5 MG tablet  Commonly known as:  VASOTEC  Take 5 mg by mouth once daily.     levocetirizine 5 MG tablet  Commonly known as:  XYZAL  Take 5 mg by mouth 4 (four) times daily.     levothyroxine 150 MCG tablet  Commonly known as:  SYNTHROID  Take 150 mcg by mouth before breakfast.     metoprolol tartrate 50 MG tablet  Commonly known as:  LOPRESSOR  Take 1 tablet (50 mg total) by mouth 2 (two) times daily.     montelukast 10 mg tablet  Commonly known as:  SINGULAIR  Take 10 mg by mouth every evening.     sertraline 100 MG tablet  Commonly known as:  ZOLOFT  Take 100 mg by mouth once daily.     venlafaxine 150 MG Cp24  Commonly known as:   EFFEXOR-XR  Take 75 mg by mouth once daily.        ASK your doctor about these medications    cephALEXin 250 MG capsule  Commonly known as:  KEFLEX  Take 1 capsule (250 mg total) by mouth every 8 (eight) hours.  Ask about: Should I take this medication?     ibuprofen 200 MG tablet  Commonly known as:  MOTRIN IB  Take 2 tablets (400 mg total) by mouth every 6 (six) hours as needed for Pain.  Ask about: Should I take this medication?            Discharge Procedure Orders  X-Ray Abdomen AP 1 View   Standing Status: Future Number of Occurrences: 1 Standing Exp. Date: 04/04/19   Order Comments: Abdominal xray to check abdominal ICD and lead position   Order Specific Question Answer Comments   Is the patient pregnant? No    May the Radiologist modify the order per protocol to meet the clinical needs of the patient? Yes      Diet Adult Regular     Other restrictions (specify):   Order Comments: No soaking wound, no heavy lifting, and no strenuous activity for 2 weeks.  No driving until off of narcotic pain medication for over 24 hours.     Notify your health care provider if you experience any of the following:  temperature >100.4     Notify your health care provider if you experience any of the following:  persistent nausea and vomiting or diarrhea     Notify your health care provider if you experience any of the following:  severe uncontrolled pain     Notify your health care provider if you experience any of the following:  redness, tenderness, or signs of infection (pain, swelling, redness, odor or green/yellow discharge around incision site)     Notify your health care provider if you experience any of the following:  difficulty breathing or increased cough     Notify your health care provider if you experience any of the following:  severe persistent headache     Notify your health care provider if you experience any of the following:  worsening rash     Notify your health care provider if you experience any of the  following:  persistent dizziness, light-headedness, or visual disturbances     Notify your health care provider if you experience any of the following:  increased confusion or weakness     Notify your health care provider if you experience any of the following:   Order Comments: Syncope, near syncope, worsening pain, palpitations, or any other questions or concerns.     Remove dressing in 48 hours   Order Comments: Remove coverderm in 48 hours and after first shower.  Leave steri-strips in place until wound check.       Follow-up Information     Chantel Saleh MD On 4/19/2018.    Specialty:  Pediatric Cardiology  Why:  For wound re-check  Contact information:  [address] [facility]  [facility] [address]  [phone]                 I have reviewed the above discharge instructions and plan at length with patient and family.  Their questions were answered and they expressed understanding.    Chintan Cabrera MD  Pediatric and Adult Congenital Electrophysiologist  Pediatric Cardiologist

## 2018-04-13 NOTE — LETTER
April 22, 2018        Chantel Saleh MD  [address]  [address]  [facility] [address]             [address] [facility] Peds Cardiology  [address]  [facility] [address]  Phone: [phone]  Fax: [phone]   Patient: Sean Baez   MR Number: [id number]   YOB: 1985   Date of Visit: 4/13/2018       Dear Dr. Saleh:    Thank you for referring Sean Baez to me for evaluation. Attached you will find relevant portions of my assessment and plan of care.    If you have questions, please do not hesitate to call me. I look forward to following Sean Baez along with you.    Sincerely,      Chintan Cabrera MD            CC  No Recipients    Enclosure

## 2018-04-23 NOTE — PROGRESS NOTES
Thank you for referring your patient Sean Baez to the electrophysiology clinic for consultation. Please review my findings below.    CHIEF COMPLAINT: Post op wound check    HISTORY OF PRESENT ILLNESS:   32 y/o female with TOF s/p repair.  She had most recent pulmonary valve replacement in 2005 and placement of epicardial dual chamber ICD with epicardial patch for sinus node dysfunction and VT. She had AICD gen change in 2011 for VT.   Seen in November 2017 by Dr. Saleh.  Still had TS but unchanged.  LV dilated and EF down to 46%.  Valve looked ok by report.  Changed rate detection to 130.   She was started on CoQ10.  History of inappropriate shock x 2 in 2005 but none since.  She was feeling very fatigued in November.  In the interim she was started on thyroid medication and CoQ10 and reports having markedly increased energy.  She had episode of sweating and really marked fatigue in August with going up steep incline and walking in sand.  She was started on thyroid medication    Sean underwent ICD generator change and pocket revision on 4/4/18 without complication.  She presents today for wound check.  She is clinically doing well.  She has no difficulty breathing or chest pain.  She continues to feel fatigue but no acute change.  She has no fever or swelling at wound site and denies abdominal pain.    REVIEW OF SYSTEMS:     GENERAL: No fever, chills,or weight loss. See HPI  SKIN: No rashes, itching or changes in color or texture of skin.  CHEST: Denies  cyanosis, wheezing, cough and sputum production. See HPI  CARDIOVASCULAR: see HPI  ABDOMEN: Appetite fine. No weight loss. Denies diarrhea, abdominal pain, or vomiting.  PERIPHERAL VASCULAR: No claudication or cyanosis.  MUSCULOSKELETAL: No joint stiffness or swelling.   NEUROLOGIC: No history of seizures,  alteration of gait or coordination.    PAST MEDICAL HISTORY:   Past Medical History:   Diagnosis Date    Sinus node dysfunction      TOF (tetralogy of Fallot)     V-tach            FAMILY HISTORY:   Family History   Problem Relation Age of Onset    Hypertension Mother     Hyperlipidemia Mother     Hypertension Father     Heart attacks under age 50 Maternal Grandmother     Stroke Maternal Grandmother     Stroke Maternal Grandfather     COPD Paternal Grandfather          SOCIAL HISTORY:   Social History     Social History    Marital status: Single     Spouse name: N/A    Number of children: N/A    Years of education: N/A     Occupational History    Not on file.     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        ALLERGIES:  Review of patient's allergies indicates:  No Known Allergies    MEDICATIONS:    Current Outpatient Prescriptions:     aspirin (ECOTRIN) 81 MG EC tablet, Take 81 mg by mouth 4 (four) times daily., Disp: , Rfl:     atorvastatin (LIPITOR) 20 MG tablet, Take 20 mg by mouth once daily., Disp: , Rfl:     coQ10, ubiquinol, 100 mg Cap, Take 100 mg by mouth 2 (two) times daily., Disp: 62 capsule, Rfl: 3    enalapril (VASOTEC) 5 MG tablet, Take 5 mg by mouth once daily., Disp: , Rfl:     levocetirizine (XYZAL) 5 MG tablet, Take 5 mg by mouth 4 (four) times daily., Disp: , Rfl:     levothyroxine (SYNTHROID) 150 MCG tablet, Take 150 mcg by mouth before breakfast., Disp: , Rfl:     metoprolol tartrate (LOPRESSOR) 50 MG tablet, Take 1 tablet (50 mg total) by mouth 2 (two) times daily., Disp: 62 tablet, Rfl: 4    montelukast (SINGULAIR) 10 mg tablet, Take 10 mg by mouth every evening., Disp: , Rfl:     coenzyme Q10 100 mg capsule, Take 100 mg by mouth., Disp: , Rfl:     furosemide (LASIX) 20 MG tablet, TK 1 T PO QD PRF EDEMA, Disp: , Rfl: 6     "hydrocodone-acetaminophen 5-325mg (NORCO) 5-325 mg per tablet, Take 1 tablet by mouth every 6 (six) hours as needed for Pain., Disp: 12 tablet, Rfl: 0    sertraline (ZOLOFT) 100 MG tablet, Take 100 mg by mouth once daily., Disp: , Rfl:     venlafaxine (EFFEXOR-XR) 150 MG Cp24, Take 75 mg by mouth once daily. , Disp: , Rfl:       PHYSICAL EXAM:   Vitals:    04/13/18 1056   BP: (!) 99/53   BP Location: Right arm   Patient Position: Sitting   Pulse: 71   SpO2: 99%   Weight: 122.7 kg (270 lb 8.1 oz)   Height: 3' 10.26" (1.175 m)         GENERAL: Awake, well-developed well-nourished, no apparent distress  HEENT: mucous membranes moist and pink, normocephalic atraumatic, no cranial or carotid bruits, sclera anicteric  NECK: no jugular venous distention, no lymphadenopathy  CHEST: Good air movement, clear to auscultation bilaterally  CARDIOVASCULAR: Quiet precordium, regular rate and rhythm, S1S2, no murmurs rubs or gallops  ABDOMEN: Soft, nontender nondistended, no hepatomegaly, no aortic bruits.  ICD generator incision healing well.  EXTREMITIES: Warm well perfused, 2+ radial/pedal pulses, capillary refill 2 seconds, no clubbing, cyanosis, or edema  NEURO: Alert and oriented, cooperative with exam, face symmetric, moves all extremities well    STUDIES:    ECG: Atrial paced with intact but prolonged AV conduction              T wave inversion in anterolateral and inferior leads      ASSESSMENT:  32 y/o female with TOF s/p repair and tricuspid stenosis with ICD.  She is s/p ICD generator change.    PLAN:   EP f/u in 6 months with ECG.  Continue with plan for cath at end of April.  Keep follow up with Dr. Saleh as planned.  Change Metoprolol XL to 50mg BID  Call for chest pain, syncope, palpitations, or any other questions or concerns.      Time Spent: 20 (min) with over 50% in direct patient and family consultation regarding management of ICD and arrhythmias with congenital heart disease.     The patient's doctor " will be notified via EPIC/FAX    I hope this brings you up-to-date on Sean Gomez Nestor  Please contact me with any questions or concerns.    Chintan Cabrera MD  Pediatric and Adult Congenital Electrophysiologist  Pediatric Cardiologist

## 2018-04-25 ENCOUNTER — TELEPHONE (OUTPATIENT)
Dept: PEDIATRIC CARDIOLOGY | Facility: CLINIC | Age: 33
End: 2018-04-25

## 2018-04-26 ENCOUNTER — ANESTHESIA EVENT (OUTPATIENT)
Dept: MEDSURG UNIT | Facility: HOSPITAL | Age: 33
End: 2018-04-26
Payer: COMMERCIAL

## 2018-04-26 ENCOUNTER — ANESTHESIA (OUTPATIENT)
Dept: MEDSURG UNIT | Facility: HOSPITAL | Age: 33
End: 2018-04-26
Payer: COMMERCIAL

## 2018-04-26 ENCOUNTER — SURGERY (OUTPATIENT)
Age: 33
End: 2018-04-26

## 2018-04-26 ENCOUNTER — HOSPITAL ENCOUNTER (OUTPATIENT)
Facility: HOSPITAL | Age: 33
LOS: 1 days | Discharge: HOME | End: 2018-04-26
Attending: PEDIATRICS | Admitting: PEDIATRICS
Payer: COMMERCIAL

## 2018-04-26 VITALS
HEART RATE: 64 BPM | HEIGHT: 69 IN | OXYGEN SATURATION: 100 % | BODY MASS INDEX: 36.29 KG/M2 | RESPIRATION RATE: 18 BRPM | WEIGHT: 245 LBS | DIASTOLIC BLOOD PRESSURE: 53 MMHG | TEMPERATURE: 98 F | SYSTOLIC BLOOD PRESSURE: 117 MMHG

## 2018-04-26 DIAGNOSIS — Q21.3 TOF (TETRALOGY OF FALLOT): ICD-10-CM

## 2018-04-26 DIAGNOSIS — Z87.74 TETRALOGY OF FALLOT S/P REPAIR: Primary | ICD-10-CM

## 2018-04-26 DIAGNOSIS — Q24.9 CONGENITAL MALFORMATION OF HEART: ICD-10-CM

## 2018-04-26 LAB
ABO + RH BLD: NORMAL
ANION GAP SERPL CALC-SCNC: 9 MMOL/L
APTT BLDCRRT: 24.3 SEC
B-HCG UR QL: NEGATIVE
BASOPHILS # BLD AUTO: 0.04 K/UL
BASOPHILS NFR BLD: 0.5 %
BLD GP AB SCN CELLS X3 SERPL QL: NORMAL
BUN SERPL-MCNC: 11 MG/DL
CALCIUM SERPL-MCNC: 9.5 MG/DL
CHLORIDE SERPL-SCNC: 106 MMOL/L
CO2 SERPL-SCNC: 23 MMOL/L
CREAT SERPL-MCNC: 0.8 MG/DL
CTP QC/QA: YES
DIFFERENTIAL METHOD: ABNORMAL
EOSINOPHIL # BLD AUTO: 0.1 K/UL
EOSINOPHIL NFR BLD: 0.8 %
ERYTHROCYTE [DISTWIDTH] IN BLOOD BY AUTOMATED COUNT: 13.3 %
EST. GFR  (AFRICAN AMERICAN): >60 ML/MIN/1.73 M^2
EST. GFR  (NON AFRICAN AMERICAN): >60 ML/MIN/1.73 M^2
GLUCOSE SERPL-MCNC: 92 MG/DL
HCT VFR BLD AUTO: 35.7 %
HGB BLD-MCNC: 11.3 G/DL
IMM GRANULOCYTES # BLD AUTO: 0.01 K/UL
IMM GRANULOCYTES NFR BLD AUTO: 0.1 %
INR PPP: 1
LYMPHOCYTES # BLD AUTO: 2.5 K/UL
LYMPHOCYTES NFR BLD: 32 %
MCH RBC QN AUTO: 26.5 PG
MCHC RBC AUTO-ENTMCNC: 31.7 G/DL
MCV RBC AUTO: 84 FL
MONOCYTES # BLD AUTO: 0.5 K/UL
MONOCYTES NFR BLD: 6.8 %
NEUTROPHILS # BLD AUTO: 4.6 K/UL
NEUTROPHILS NFR BLD: 59.8 %
NRBC BLD-RTO: 0 /100 WBC
PLATELET # BLD AUTO: 343 K/UL
PMV BLD AUTO: 9.8 FL
POTASSIUM SERPL-SCNC: 4 MMOL/L
PROTHROMBIN TIME: 10.7 SEC
RBC # BLD AUTO: 4.26 M/UL
SODIUM SERPL-SCNC: 138 MMOL/L
WBC # BLD AUTO: 7.76 K/UL

## 2018-04-26 PROCEDURE — 25000003 PHARM REV CODE 250: Performed by: NURSE ANESTHETIST, CERTIFIED REGISTERED

## 2018-04-26 PROCEDURE — 85025 COMPLETE CBC W/AUTO DIFF WBC: CPT

## 2018-04-26 PROCEDURE — 93662 INTRACARDIAC ECG (ICE): CPT

## 2018-04-26 PROCEDURE — 93320 DOPPLER ECHO COMPLETE: CPT | Performed by: PEDIATRICS

## 2018-04-26 PROCEDURE — 25000003 PHARM REV CODE 250: Performed by: PEDIATRICS

## 2018-04-26 PROCEDURE — D9220A PRA ANESTHESIA: Mod: ANES,,, | Performed by: ANESTHESIOLOGY

## 2018-04-26 PROCEDURE — 85610 PROTHROMBIN TIME: CPT

## 2018-04-26 PROCEDURE — 93568 NJX CAR CTH NSLC P-ART ANGRP: CPT | Mod: ,,, | Performed by: PEDIATRICS

## 2018-04-26 PROCEDURE — 80048 BASIC METABOLIC PNL TOTAL CA: CPT

## 2018-04-26 PROCEDURE — 81025 URINE PREGNANCY TEST: CPT | Performed by: PEDIATRICS

## 2018-04-26 PROCEDURE — 93010 ELECTROCARDIOGRAM REPORT: CPT | Mod: ,,, | Performed by: PEDIATRICS

## 2018-04-26 PROCEDURE — 63600175 PHARM REV CODE 636 W HCPCS

## 2018-04-26 PROCEDURE — 93317 ECHO TRANSESOPHAGEAL: CPT | Performed by: PEDIATRICS

## 2018-04-26 PROCEDURE — 27000221 HC OXYGEN, UP TO 24 HOURS

## 2018-04-26 PROCEDURE — 93325 DOPPLER ECHO COLOR FLOW MAPG: CPT | Performed by: PEDIATRICS

## 2018-04-26 PROCEDURE — 37000009 HC ANESTHESIA EA ADD 15 MINS: Performed by: PEDIATRICS

## 2018-04-26 PROCEDURE — 63600175 PHARM REV CODE 636 W HCPCS: Performed by: NURSE ANESTHETIST, CERTIFIED REGISTERED

## 2018-04-26 PROCEDURE — 94761 N-INVAS EAR/PLS OXIMETRY MLT: CPT

## 2018-04-26 PROCEDURE — 86920 COMPATIBILITY TEST SPIN: CPT

## 2018-04-26 PROCEDURE — 86850 RBC ANTIBODY SCREEN: CPT

## 2018-04-26 PROCEDURE — 85730 THROMBOPLASTIN TIME PARTIAL: CPT

## 2018-04-26 PROCEDURE — 93563 NJX CGEN CAR CTH SLCTV C ANG: CPT | Mod: ,,, | Performed by: PEDIATRICS

## 2018-04-26 PROCEDURE — 93313 ECHO TRANSESOPHAGEAL: CPT | Mod: 26,59,, | Performed by: ANESTHESIOLOGY

## 2018-04-26 PROCEDURE — 93531 CATH LAB PROCEDURE: CPT | Mod: 26,,, | Performed by: PEDIATRICS

## 2018-04-26 PROCEDURE — 37000008 HC ANESTHESIA 1ST 15 MINUTES: Performed by: PEDIATRICS

## 2018-04-26 PROCEDURE — D9220A PRA ANESTHESIA: Mod: CRNA,,, | Performed by: NURSE ANESTHETIST, CERTIFIED REGISTERED

## 2018-04-26 PROCEDURE — 93005 ELECTROCARDIOGRAM TRACING: CPT

## 2018-04-26 RX ORDER — FENTANYL CITRATE 50 UG/ML
INJECTION, SOLUTION INTRAMUSCULAR; INTRAVENOUS
Status: DISCONTINUED | OUTPATIENT
Start: 2018-04-26 | End: 2018-04-26

## 2018-04-26 RX ORDER — SODIUM CHLORIDE 9 MG/ML
INJECTION, SOLUTION INTRAVENOUS CONTINUOUS
Status: DISCONTINUED | OUTPATIENT
Start: 2018-04-26 | End: 2018-04-27 | Stop reason: HOSPADM

## 2018-04-26 RX ORDER — GLYCOPYRROLATE 0.2 MG/ML
INJECTION INTRAMUSCULAR; INTRAVENOUS
Status: DISCONTINUED | OUTPATIENT
Start: 2018-04-26 | End: 2018-04-26

## 2018-04-26 RX ORDER — MIDAZOLAM HYDROCHLORIDE 1 MG/ML
INJECTION, SOLUTION INTRAMUSCULAR; INTRAVENOUS
Status: DISCONTINUED | OUTPATIENT
Start: 2018-04-26 | End: 2018-04-26

## 2018-04-26 RX ORDER — ROCURONIUM BROMIDE 10 MG/ML
INJECTION, SOLUTION INTRAVENOUS
Status: DISCONTINUED | OUTPATIENT
Start: 2018-04-26 | End: 2018-04-26

## 2018-04-26 RX ORDER — HEPARIN SODIUM 1000 [USP'U]/ML
INJECTION, SOLUTION INTRAVENOUS; SUBCUTANEOUS
Status: DISCONTINUED | OUTPATIENT
Start: 2018-04-26 | End: 2018-04-26

## 2018-04-26 RX ORDER — IBUPROFEN 400 MG/1
400 TABLET ORAL EVERY 6 HOURS PRN
Status: DISCONTINUED | OUTPATIENT
Start: 2018-04-26 | End: 2018-04-27 | Stop reason: HOSPADM

## 2018-04-26 RX ORDER — PROPOFOL 10 MG/ML
VIAL (ML) INTRAVENOUS
Status: DISCONTINUED | OUTPATIENT
Start: 2018-04-26 | End: 2018-04-26

## 2018-04-26 RX ORDER — ACETAMINOPHEN 325 MG/1
650 TABLET ORAL EVERY 4 HOURS PRN
Status: DISCONTINUED | OUTPATIENT
Start: 2018-04-26 | End: 2018-04-27 | Stop reason: HOSPADM

## 2018-04-26 RX ORDER — NEOSTIGMINE METHYLSULFATE 1 MG/ML
INJECTION, SOLUTION INTRAVENOUS
Status: DISCONTINUED | OUTPATIENT
Start: 2018-04-26 | End: 2018-04-26

## 2018-04-26 RX ORDER — LIDOCAINE HCL/PF 100 MG/5ML
SYRINGE (ML) INTRAVENOUS
Status: DISCONTINUED | OUTPATIENT
Start: 2018-04-26 | End: 2018-04-26

## 2018-04-26 RX ORDER — OXYCODONE HYDROCHLORIDE 5 MG/1
10 TABLET ORAL EVERY 4 HOURS PRN
Status: DISCONTINUED | OUTPATIENT
Start: 2018-04-26 | End: 2018-04-27 | Stop reason: HOSPADM

## 2018-04-26 RX ORDER — EPHEDRINE SULFATE 50 MG/ML
INJECTION, SOLUTION INTRAVENOUS
Status: DISCONTINUED | OUTPATIENT
Start: 2018-04-26 | End: 2018-04-26

## 2018-04-26 RX ORDER — CEFAZOLIN SODIUM 1 G/3ML
INJECTION, POWDER, FOR SOLUTION INTRAMUSCULAR; INTRAVENOUS
Status: DISCONTINUED | OUTPATIENT
Start: 2018-04-26 | End: 2018-04-26

## 2018-04-26 RX ADMIN — ROCURONIUM BROMIDE 10 MG: 10 INJECTION, SOLUTION INTRAVENOUS at 11:04

## 2018-04-26 RX ADMIN — SUGAMMADEX 400 MG: 100 INJECTION, SOLUTION INTRAVENOUS at 12:04

## 2018-04-26 RX ADMIN — IBUPROFEN 400 MG: 400 TABLET, FILM COATED ORAL at 05:04

## 2018-04-26 RX ADMIN — LIDOCAINE HYDROCHLORIDE 100 MG: 20 INJECTION, SOLUTION INTRAVENOUS at 10:04

## 2018-04-26 RX ADMIN — GLYCOPYRROLATE 0.6 MCG: 0.2 INJECTION, SOLUTION INTRAMUSCULAR; INTRAVENOUS at 12:04

## 2018-04-26 RX ADMIN — NEOSTIGMINE METHYLSULFATE 5 MG: 1 INJECTION INTRAVENOUS at 12:04

## 2018-04-26 RX ADMIN — PROPOFOL 30 MG: 10 INJECTION, EMULSION INTRAVENOUS at 11:04

## 2018-04-26 RX ADMIN — HEPARIN SODIUM 5000 UNITS: 1000 INJECTION INTRAVENOUS; SUBCUTANEOUS at 11:04

## 2018-04-26 RX ADMIN — EPHEDRINE SULFATE 5 MG: 50 INJECTION, SOLUTION INTRAMUSCULAR; INTRAVENOUS; SUBCUTANEOUS at 11:04

## 2018-04-26 RX ADMIN — ROCURONIUM BROMIDE 20 MG: 10 INJECTION, SOLUTION INTRAVENOUS at 11:04

## 2018-04-26 RX ADMIN — FENTANYL CITRATE 100 MCG: 50 INJECTION, SOLUTION INTRAMUSCULAR; INTRAVENOUS at 10:04

## 2018-04-26 RX ADMIN — ROCURONIUM BROMIDE 50 MG: 10 INJECTION, SOLUTION INTRAVENOUS at 10:04

## 2018-04-26 RX ADMIN — CEFAZOLIN 2 G: 330 INJECTION, POWDER, FOR SOLUTION INTRAMUSCULAR; INTRAVENOUS at 11:04

## 2018-04-26 RX ADMIN — MIDAZOLAM 2 MG: 1 INJECTION INTRAMUSCULAR; INTRAVENOUS at 10:04

## 2018-04-26 RX ADMIN — SODIUM CHLORIDE: 0.9 INJECTION, SOLUTION INTRAVENOUS at 11:04

## 2018-04-26 RX ADMIN — ACETAMINOPHEN 650 MG: 325 TABLET ORAL at 02:04

## 2018-04-26 RX ADMIN — EPHEDRINE SULFATE 5 MG: 50 INJECTION, SOLUTION INTRAMUSCULAR; INTRAVENOUS; SUBCUTANEOUS at 10:04

## 2018-04-26 RX ADMIN — SODIUM CHLORIDE: 0.9 INJECTION, SOLUTION INTRAVENOUS at 09:04

## 2018-04-26 RX ADMIN — PROPOFOL 170 MG: 10 INJECTION, EMULSION INTRAVENOUS at 10:04

## 2018-04-26 RX ADMIN — EPHEDRINE SULFATE 5 MG: 50 INJECTION, SOLUTION INTRAMUSCULAR; INTRAVENOUS; SUBCUTANEOUS at 12:04

## 2018-04-26 NOTE — NURSING TRANSFER
Nursing Transfer Note      4/26/2018     Transfer to: 316    Transfer via: Stretcher    Transfer with: Cardiac monitoring    Transported by: PCT    Medicines sent: N/A    Chart send with patient: Yes    Notified: mother; Report called to NATHAN Wood    Patient reassessed at: 1500

## 2018-04-26 NOTE — PLAN OF CARE
Problem: Patient Care Overview  Goal: Plan of Care Review  Outcome: Ongoing (interventions implemented as appropriate)  Received report from PACU, RN. Patient s/p PVR, AAOx3. VSS, no c/o pain or discomfort at this time, resp even and unlabored. Gauze/tegaderm dressing to R groin is CDI. No active bleeding. No hematoma noted. Post procedure protocol reviewed with patient and patient's family. Understanding verbalized. Family members at bedside. Nurse call bell within reach. Will continue to monitor per post procedure protocol.

## 2018-04-26 NOTE — TRANSFER OF CARE
"Anesthesia Transfer of Care Note    Patient: Sean Baez    Procedure(s) Performed: Procedure(s) (LRB):  TRANSESOPHAGEAL ECHOCARDIOGRAM (SHALINI) (N/A)  RHC WITH RETRO LHC CONGEITAL CARD ABN (N/A)  REPLACEMENT-VALVE-PULMONARY (N/A)    Patient location: PACU    Anesthesia Type: general    Transport from OR: Transported from OR on 2-3 L/min O2 by NC with adequate spontaneous ventilation. Continuous ECG monitoring in transport. Continuous SpO2 monitoring in transport    Post pain: adequate analgesia    Post assessment: no apparent anesthetic complications    Post vital signs: stable    Level of consciousness: awake    Nausea/Vomiting: no nausea/vomiting    Complications: none    Transfer of care protocol was followed      Last vitals:   Visit Vitals  BP (!) 109/59 (BP Location: Left arm, Patient Position: Lying)   Pulse 61   Temp 37.1 °C (98.8 °F) (Oral)   Resp 18   Ht 5' 9" (1.753 m)   Wt 111.1 kg (245 lb)   SpO2 96%   Breastfeeding? No   BMI 36.18 kg/m²     "

## 2018-04-26 NOTE — INTERVAL H&P NOTE
The patient has been examined and the H&P has been reviewed:    I concur with the findings and no changes have occurred since H&P was written.    Anesthesia/Surgery risks, benefits and alternative options discussed and understood by patient/family.          Active Hospital Problems    Diagnosis  POA    TOF (tetralogy of Fallot) [Q21.3]  Not Applicable      Resolved Hospital Problems    Diagnosis Date Resolved POA   No resolved problems to display.     Marlyn Early III, MD  Pediatric Cardiology  Interventional Cardiology  [facility]  [address] [place] [address]

## 2018-04-26 NOTE — OP NOTE
MD:  Isaac Hensley MD  Assistant: Marlyn Early III, MD  Procedure: 1) Right heart prograde catheterization (congenital anomalies)  2) Left heart retrograde catheterization (congenital anomalies)  3) Transesophageal echocardiogram  4) Intracardiac echocardiogram  Indication for procedure: Repaired TOF with pulmonary stenosis/insufficiency  Angios:  1) Left coronary 2) Right coronary artery  Intervention: None performed  Procedure time: 1hr 11 minutes  Fluoroscopy time: 10.5 minutes  Contrast total: 24cc  Access: 9F RFV, 5F RFV, 4F RFA  Estimated blood loss: 3cc  Replaced: None  Anesthesia: GETA  Anticoagulation: Heparin 5000 units IV X1  Antibiotics: Ancef 2gm IV X1  Complications: None evident  Findings:   1) Repaired tetralogy of Fallot s/p 29mm Mosaic valve implantation  2) 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

## 2018-04-26 NOTE — H&P (VIEW-ONLY)
Thank you for referring your patient Sean Baez to the electrophysiology clinic for consultation. Please review my findings below.    CHIEF COMPLAINT: Post op wound check    HISTORY OF PRESENT ILLNESS:   32 y/o female with TOF s/p repair.  She had most recent pulmonary valve replacement in 2005 and placement of epicardial dual chamber ICD with epicardial patch for sinus node dysfunction and VT. She had AICD gen change in 2011 for VT.   Seen in November 2017 by Dr. Saleh.  Still had TS but unchanged.  LV dilated and EF down to 46%.  Valve looked ok by report.  Changed rate detection to 130.   She was started on CoQ10.  History of inappropriate shock x 2 in 2005 but none since.  She was feeling very fatigued in November.  In the interim she was started on thyroid medication and CoQ10 and reports having markedly increased energy.  She had episode of sweating and really marked fatigue in August with going up steep incline and walking in sand.  She was started on thyroid medication    Sean underwent ICD generator change and pocket revision on 4/4/18 without complication.  She presents today for wound check.  She is clinically doing well.  She has no difficulty breathing or chest pain.  She continues to feel fatigue but no acute change.  She has no fever or swelling at wound site and denies abdominal pain.    REVIEW OF SYSTEMS:     GENERAL: No fever, chills,or weight loss. See HPI  SKIN: No rashes, itching or changes in color or texture of skin.  CHEST: Denies  cyanosis, wheezing, cough and sputum production. See HPI  CARDIOVASCULAR: see HPI  ABDOMEN: Appetite fine. No weight loss. Denies diarrhea, abdominal pain, or vomiting.  PERIPHERAL VASCULAR: No claudication or cyanosis.  MUSCULOSKELETAL: No joint stiffness or swelling.   NEUROLOGIC: No history of seizures,  alteration of gait or coordination.    PAST MEDICAL HISTORY:   Past Medical History:   Diagnosis Date    Sinus node dysfunction      TOF (tetralogy of Fallot)     V-tach            FAMILY HISTORY:   Family History   Problem Relation Age of Onset    Hypertension Mother     Hyperlipidemia Mother     Hypertension Father     Heart attacks under age 50 Maternal Grandmother     Stroke Maternal Grandmother     Stroke Maternal Grandfather     COPD Paternal Grandfather          SOCIAL HISTORY:   Social History     Social History    Marital status: Single     Spouse name: N/A    Number of children: N/A    Years of education: N/A     Occupational History    Not on file.     Social History Main Topics    Smoking status: Never Smoker    Smokeless tobacco: Never Used    Alcohol use Yes      Comment: socially    Drug use: No    Sexual activity: Not on file     Other Topics Concern    Not on file     Social History Narrative    Physician at [employer].        ALLERGIES:  Review of patient's allergies indicates:  No Known Allergies    MEDICATIONS:    Current Outpatient Prescriptions:     aspirin (ECOTRIN) 81 MG EC tablet, Take 81 mg by mouth 4 (four) times daily., Disp: , Rfl:     atorvastatin (LIPITOR) 20 MG tablet, Take 20 mg by mouth once daily., Disp: , Rfl:     coQ10, ubiquinol, 100 mg Cap, Take 100 mg by mouth 2 (two) times daily., Disp: 62 capsule, Rfl: 3    enalapril (VASOTEC) 5 MG tablet, Take 5 mg by mouth once daily., Disp: , Rfl:     levocetirizine (XYZAL) 5 MG tablet, Take 5 mg by mouth 4 (four) times daily., Disp: , Rfl:     levothyroxine (SYNTHROID) 150 MCG tablet, Take 150 mcg by mouth before breakfast., Disp: , Rfl:     metoprolol tartrate (LOPRESSOR) 50 MG tablet, Take 1 tablet (50 mg total) by mouth 2 (two) times daily., Disp: 62 tablet, Rfl: 4    montelukast (SINGULAIR) 10 mg tablet, Take 10 mg by mouth every evening., Disp: , Rfl:     coenzyme Q10 100 mg capsule, Take 100 mg by mouth., Disp: , Rfl:     furosemide (LASIX) 20 MG tablet, TK 1 T PO QD PRF EDEMA, Disp: , Rfl: 6     "hydrocodone-acetaminophen 5-325mg (NORCO) 5-325 mg per tablet, Take 1 tablet by mouth every 6 (six) hours as needed for Pain., Disp: 12 tablet, Rfl: 0    sertraline (ZOLOFT) 100 MG tablet, Take 100 mg by mouth once daily., Disp: , Rfl:     venlafaxine (EFFEXOR-XR) 150 MG Cp24, Take 75 mg by mouth once daily. , Disp: , Rfl:       PHYSICAL EXAM:   Vitals:    04/13/18 1056   BP: (!) 99/53   BP Location: Right arm   Patient Position: Sitting   Pulse: 71   SpO2: 99%   Weight: 122.7 kg (270 lb 8.1 oz)   Height: 3' 10.26" (1.175 m)         GENERAL: Awake, well-developed well-nourished, no apparent distress  HEENT: mucous membranes moist and pink, normocephalic atraumatic, no cranial or carotid bruits, sclera anicteric  NECK: no jugular venous distention, no lymphadenopathy  CHEST: Good air movement, clear to auscultation bilaterally  CARDIOVASCULAR: Quiet precordium, regular rate and rhythm, S1S2, no murmurs rubs or gallops  ABDOMEN: Soft, nontender nondistended, no hepatomegaly, no aortic bruits.  ICD generator incision healing well.  EXTREMITIES: Warm well perfused, 2+ radial/pedal pulses, capillary refill 2 seconds, no clubbing, cyanosis, or edema  NEURO: Alert and oriented, cooperative with exam, face symmetric, moves all extremities well    STUDIES:    ECG: Atrial paced with intact but prolonged AV conduction              T wave inversion in anterolateral and inferior leads      ASSESSMENT:  32 y/o female with TOF s/p repair and tricuspid stenosis with ICD.  She is s/p ICD generator change.    PLAN:   EP f/u in 6 months with ECG.  Continue with plan for cath at end of April.  Keep follow up with Dr. Saleh as planned.  Change Metoprolol XL to 50mg BID  Call for chest pain, syncope, palpitations, or any other questions or concerns.      Time Spent: 20 (min) with over 50% in direct patient and family consultation regarding management of ICD and arrhythmias with congenital heart disease.     The patient's doctor " will be notified via EPIC/FAX    I hope this brings you up-to-date on Sean Gomez Nestor  Please contact me with any questions or concerns.    Chintan Cabrera MD  Pediatric and Adult Congenital Electrophysiologist  Pediatric Cardiologist

## 2018-04-26 NOTE — ANESTHESIA POSTPROCEDURE EVALUATION
"Anesthesia Post Evaluation    Patient: Sean Baez    Procedure(s) Performed: Procedure(s) (LRB):  TRANSESOPHAGEAL ECHOCARDIOGRAM (SHALINI) (N/A)  RHC WITH RETRO LHC CONGEITAL CARD ABN (N/A)  REPLACEMENT-VALVE-PULMONARY (N/A)    Final Anesthesia Type: general  Patient location during evaluation: PACU  Patient participation: Yes- Able to Participate  Level of consciousness: awake and alert, awake and oriented  Post-procedure vital signs: reviewed and stable  Pain management: adequate  Airway patency: patent  PONV status at discharge: No PONV  Anesthetic complications: no      Cardiovascular status: blood pressure returned to baseline, stable and hemodynamically stable  Respiratory status: unassisted, spontaneous ventilation and room air  Hydration status: euvolemic  Follow-up not needed.        Visit Vitals  BP (!) 118/56 (BP Location: Left arm, Patient Position: Lying)   Pulse 60   Temp 36.8 °C (98.2 °F) (Oral)   Resp 20   Ht 5' 9" (1.753 m)   Wt 111.1 kg (245 lb)   SpO2 96%   Breastfeeding? No   BMI 36.18 kg/m²       Pain/Carlos Score: Pain Assessment Performed: Yes (4/26/2018  2:45 PM)  Presence of Pain: denies (4/26/2018  2:45 PM)  Pain Rating Prior to Med Admin: 5 (4/26/2018  2:30 PM)  Carlos Score: 10 (4/26/2018  2:00 PM)      "

## 2018-04-26 NOTE — ANESTHESIA PREPROCEDURE EVALUATION
04/26/2018  Sean Baez is a 33 y.o., female with TOF s/p repair.  She had most recent pulmonary valve replacement in 2005 and placement of epicardial dual chamber ICD with epicardial patch for sinus node dysfunction and VT.     Anesthesia Evaluation    I have reviewed the Patient Summary Reports.    I have reviewed the Nursing Notes.   I have reviewed the Medications.     Review of Systems  Anesthesia Hx:  No problems with previous Anesthesia  History of prior surgery of interest to airway management or planning: Denies Family Hx of Anesthesia complications.   Denies Personal Hx of Anesthesia complications.   Social:  Non-Smoker, No Alcohol Use    Hematology/Oncology:  Hematology Normal   Oncology Normal     EENT/Dental:EENT/Dental Normal   Cardiovascular:   Pacemaker ECG has been reviewed. TOF, cardiology notes and studies reviewed Functional Capacity good / => 4 METS   Congenital Heart Disease    Congenital Heart Disease:  Tetralogy of Fallot (TOF), s/p surgical repair, s/p total repair    Pulmonary:  Pulmonary Normal    Renal/:  Renal/ Normal     Hepatic/GI:  Hepatic/GI Normal    Neurological:  Neurology Normal    Endocrine:   Hypothyroidism Morbid obesity       Physical Exam  General:  Obesity, Well nourished    Airway/Jaw/Neck:  Airway Findings: Mouth Opening: Normal Tongue: Normal  General Airway Assessment: Adult  Mallampati: I  TM Distance: Normal, at least 6 cm  Jaw/Neck Findings:  Neck ROM: Normal ROM      Dental:  Dental Findings: In tact   Chest/Lungs:  Chest/Lungs Findings: Clear to auscultation, Normal Respiratory Rate     Heart/Vascular:  Heart Findings: Rate: Normal  Rhythm: Regular Rhythm  Sounds: Normal        Mental Status:  Mental Status Findings:  Cooperative, Alert and Oriented         Anesthesia Plan  Type of Anesthesia, risks & benefits discussed:  Anesthesia Type:   general  Patient's Preference:   Intra-op Monitoring Plan: standard ASA monitors  Intra-op Monitoring Plan Comments:   Post Op Pain Control Plan: multimodal analgesia  Post Op Pain Control Plan Comments:   Induction:   IV  Beta Blocker:  Patient is not currently on a Beta-Blocker (No further documentation required).       Informed Consent: Patient understands risks and agrees with Anesthesia plan.  Questions answered. Anesthesia consent signed with patient.  ASA Score: 3     Day of Surgery Review of History & Physical:    H&P update referred to the surgeon.         Ready For Surgery From Anesthesia Perspective.

## 2018-04-26 NOTE — ANESTHESIA RELEASE NOTE
"Anesthesia Release from PACU Note    Patient: Sean Baez    Procedure(s) Performed: Procedure(s) (LRB):  TRANSESOPHAGEAL ECHOCARDIOGRAM (SHALINI) (N/A)  RHC WITH RETRO LHC CONGEITAL CARD ABN (N/A)  REPLACEMENT-VALVE-PULMONARY (N/A)    Anesthesia type: general    Post pain: Adequate analgesia    Post assessment: no apparent anesthetic complications, tolerated procedure well and no evidence of recall    Last Vitals:   Visit Vitals  BP (!) 118/56 (BP Location: Left arm, Patient Position: Lying)   Pulse 60   Temp 36.8 °C (98.2 °F) (Oral)   Resp 20   Ht 5' 9" (1.753 m)   Wt 111.1 kg (245 lb)   SpO2 96%   Breastfeeding? No   BMI 36.18 kg/m²       Post vital signs: stable    Level of consciousness: awake, alert  and oriented    Nausea/Vomiting: no nausea/no vomiting    Complications: none    Airway Patency: patent    Respiratory: unassisted, spontaneous ventilation, room air    Cardiovascular: stable and blood pressure at baseline    Hydration: euvolemic  "

## 2018-04-26 NOTE — PLAN OF CARE
Pt AAOx4. Complaints of mild soreness to right groin access site. PRN PO pain medication administered as ordered. Dressing remains CDI. Pedal pulses 2+ bilaterally. VSS. Tolerating clear liquids. Safety maintained.

## 2018-04-26 NOTE — PROGRESS NOTES
Paged resident on call for Dr. Hensley for the second time to clarify bedrest and diet orders. Awaiting call back.

## 2018-04-26 NOTE — ANESTHESIA PROCEDURE NOTES
Monitor SHALINI    Diagnosis: TOF, pulmonary stenosis  Patient location during procedure: OR  Procedure start time: 4/26/2018 10:53 AM  Timeout: 4/26/2018 10:53 AM  Procedure end time: 4/26/2018 10:53 AM  Surgery related to: Pulmonary valve evaluation  Exam type: Monitor Only  Staffing  Anesthesiologist: DEEPAK ALVAREZ  Performed: anesthesiologist   Preanesthetic Checklist  Completed: patient identified, surgical consent, pre-op evaluation, timeout performed, risks and benefits discussed, monitors and equipment checked, anesthesia consent given, oxygen available, suction available, hand hygiene performed and patient being monitored  Setup & Induction  Patient preparation: bite block inserted  Probe Insertion: easy  Exam: incomplete    Exam                                      Effusions    Summary    Other Findings  SHALINI PLACEMENT BY DR. ALVAREZ, SHALINI EXAM AND INTERPRETATION BY  (PEDS CARDIOLOGIST)

## 2018-04-27 LAB
GLUCOSE SERPL-MCNC: 96 MG/DL (ref 70–110)
HCO3 UR-SCNC: 22.3 MMOL/L (ref 24–28)
HCT VFR BLD CALC: 31 %PCV (ref 36–54)
PCO2 BLDA: 34.4 MMHG (ref 35–45)
PH SMN: 7.42 [PH] (ref 7.35–7.45)
PO2 BLDA: 136 MMHG (ref 80–100)
POC ACTIVATED CLOTTING TIME K: 169 SEC (ref 74–137)
POC ACTIVATED CLOTTING TIME K: 186 SEC (ref 74–137)
POC BE: -2 MMOL/L
POC IONIZED CALCIUM: 1.16 MMOL/L (ref 1.06–1.42)
POC SATURATED O2: 99 % (ref 95–100)
POC TCO2: 23 MMOL/L (ref 23–27)
POTASSIUM BLD-SCNC: 3.6 MMOL/L (ref 3.5–5.1)
SAMPLE: ABNORMAL
SODIUM BLD-SCNC: 139 MMOL/L (ref 136–145)

## 2018-04-27 NOTE — DISCHARGE SUMMARY
[facility]  Discharge Note  Short Stay    Admit Date: 4/26/2018    Discharge Date and Time: 4/26/2018  1950    Attending Physician: Isaac Hensley Jr., MD     Discharge Provider: Marlyn Early    Diagnoses:  Active Hospital Problems    Diagnosis  POA    *TOF (tetralogy of Fallot) [Q21.3]  Not Applicable      Resolved Hospital Problems    Diagnosis Date Resolved POA   No resolved problems to display.       Discharged Condition: good    Hospital Course: Patient was admitted for an outpatient procedure and tolerated the procedure well with no complications.    Final Diagnoses: Same as principal problem.    Disposition: Home or Self Care    Follow up/Patient Instructions: Follow-up with Dr. Carpenter in 1-2 weeks.  Please call for appointment.   Medications:  Reconciled Home Medications:      Medication List      CONTINUE taking these medications    aspirin 81 MG EC tablet  Commonly known as:  ECOTRIN  Take 81 mg by mouth once daily.     atorvastatin 20 MG tablet  Commonly known as:  LIPITOR  Take 20 mg by mouth once daily.     coQ10 (ubiquinol) 100 mg Cap  Take 100 mg by mouth 2 (two) times daily.     enalapril 5 MG tablet  Commonly known as:  VASOTEC  Take 5 mg by mouth once daily.     furosemide 20 MG tablet  Commonly known as:  LASIX  TK 1 T PO QD PRF EDEMA     levocetirizine 5 MG tablet  Commonly known as:  XYZAL  Take 5 mg by mouth once daily.     levothyroxine 150 MCG tablet  Commonly known as:  SYNTHROID  Take 150 mcg by mouth before breakfast.     metoprolol tartrate 50 MG tablet  Commonly known as:  LOPRESSOR  Take 1 tablet (50 mg total) by mouth 2 (two) times daily.     montelukast 10 mg tablet  Commonly known as:  SINGULAIR  Take 10 mg by mouth every evening.     sertraline 100 MG tablet  Commonly known as:  ZOLOFT  Take 100 mg by mouth once daily.     venlafaxine 150 MG Cp24  Commonly known as:  EFFEXOR-XR  Take 75 mg by mouth once daily.        STOP taking these medications     hydrocodone-acetaminophen 5-325mg 5-325 mg per tablet  Commonly known as:  NORCO            Discharge Procedure Orders  Diet Adult Regular     No dressing needed     Activity as tolerated     Notify your health care provider if you experience any of the following:  increased confusion or weakness     Notify your health care provider if you experience any of the following:  persistent dizziness, light-headedness, or visual disturbances     Notify your health care provider if you experience any of the following:  worsening rash     Notify your health care provider if you experience any of the following:  severe persistent headache     Notify your health care provider if you experience any of the following:  difficulty breathing or increased cough     Notify your health care provider if you experience any of the following:  redness, tenderness, or signs of infection (pain, swelling, redness, odor or green/yellow discharge around incision site)     Notify your health care provider if you experience any of the following:  severe uncontrolled pain     Notify your health care provider if you experience any of the following:  persistent nausea and vomiting or diarrhea     Notify your health care provider if you experience any of the following:  temperature >100.4           Discharge Procedure Orders (must include Diet, Follow-up, Activity):    Discharge Procedure Orders (must include Diet, Follow-up, Activity)  Diet Adult Regular     No dressing needed     Activity as tolerated     Notify your health care provider if you experience any of the following:  increased confusion or weakness     Notify your health care provider if you experience any of the following:  persistent dizziness, light-headedness, or visual disturbances     Notify your health care provider if you experience any of the following:  worsening rash     Notify your health care provider if you experience any of the following:  severe persistent headache      Notify your health care provider if you experience any of the following:  difficulty breathing or increased cough     Notify your health care provider if you experience any of the following:  redness, tenderness, or signs of infection (pain, swelling, redness, odor or green/yellow discharge around incision site)     Notify your health care provider if you experience any of the following:  severe uncontrolled pain     Notify your health care provider if you experience any of the following:  persistent nausea and vomiting or diarrhea     Notify your health care provider if you experience any of the following:  temperature >100.4

## 2018-04-27 NOTE — PROGRESS NOTES
patient has ambulated and voided post angiogram protocol. Right groin access site with dry gauze/tegaderm dressing intact.no bleeding, no hemaotma. + 2 pedal pulses. No complaints at present. Iv heplock removed.tele monitor removed. Discharge instructions reviewed. AVS printed and given to patient. Wheelchair provided for discahrge. Family at bedside.

## 2018-04-30 LAB
BLD PROD TYP BPU: NORMAL
BLD PROD TYP BPU: NORMAL
BLOOD UNIT EXPIRATION DATE: NORMAL
BLOOD UNIT EXPIRATION DATE: NORMAL
BLOOD UNIT TYPE CODE: 5100
BLOOD UNIT TYPE CODE: 5100
BLOOD UNIT TYPE: NORMAL
BLOOD UNIT TYPE: NORMAL
CODING SYSTEM: NORMAL
CODING SYSTEM: NORMAL
DISPENSE STATUS: NORMAL
DISPENSE STATUS: NORMAL
TRANS ERYTHROCYTES VOL PATIENT: NORMAL ML
TRANS ERYTHROCYTES VOL PATIENT: NORMAL ML

## 2018-05-01 ENCOUNTER — LAB VISIT (OUTPATIENT)
Dept: LAB | Facility: OTHER | Age: 33
End: 2018-05-01
Payer: COMMERCIAL

## 2018-05-01 ENCOUNTER — CLINICAL SUPPORT (OUTPATIENT)
Dept: CARDIOLOGY | Facility: CLINIC | Age: 33
End: 2018-05-01
Payer: COMMERCIAL

## 2018-05-01 ENCOUNTER — OFFICE VISIT (OUTPATIENT)
Dept: CARDIOLOGY | Facility: CLINIC | Age: 33
End: 2018-05-01
Payer: COMMERCIAL

## 2018-05-01 VITALS
DIASTOLIC BLOOD PRESSURE: 64 MMHG | HEART RATE: 64 BPM | WEIGHT: 267.44 LBS | SYSTOLIC BLOOD PRESSURE: 105 MMHG | BODY MASS INDEX: 39.49 KG/M2

## 2018-05-01 DIAGNOSIS — I42.5 CONSTRICTIVE CARDIOMYOPATHY: ICD-10-CM

## 2018-05-01 DIAGNOSIS — I47.20 VT (VENTRICULAR TACHYCARDIA): ICD-10-CM

## 2018-05-01 DIAGNOSIS — Q21.3 TETRALOGY OF FALLOT: Primary | ICD-10-CM

## 2018-05-01 DIAGNOSIS — E03.9 HYPOTHYROIDISM, UNSPECIFIED TYPE: ICD-10-CM

## 2018-05-01 DIAGNOSIS — I31.1 CONSTRICTIVE PERICARDITIS: ICD-10-CM

## 2018-05-01 DIAGNOSIS — J98.4 PULMONARY INSUFFICIENCY: ICD-10-CM

## 2018-05-01 DIAGNOSIS — Q21.3 TETRALOGY OF FALLOT: ICD-10-CM

## 2018-05-01 DIAGNOSIS — Z95.810 AICD (AUTOMATIC CARDIOVERTER/DEFIBRILLATOR) PRESENT: ICD-10-CM

## 2018-05-01 PROBLEM — I37.0 PULMONARY STENOSIS: Status: ACTIVE | Noted: 2018-05-01

## 2018-05-01 PROBLEM — R94.30 EJECTION FRACTION < 50%: Status: RESOLVED | Noted: 2018-01-12 | Resolved: 2018-05-01

## 2018-05-01 LAB
ALBUMIN SERPL BCP-MCNC: 3.6 G/DL
ALP SERPL-CCNC: 89 U/L
ALT SERPL W/O P-5'-P-CCNC: 25 U/L
ANION GAP SERPL CALC-SCNC: 10 MMOL/L
AST SERPL-CCNC: 14 U/L
BILIRUB SERPL-MCNC: 2 MG/DL
BNP SERPL-MCNC: 209 PG/ML
BUN SERPL-MCNC: 8 MG/DL
C3 SERPL-MCNC: 185 MG/DL
C4 SERPL-MCNC: 33 MG/DL
CALCIUM SERPL-MCNC: 9.6 MG/DL
CHLORIDE SERPL-SCNC: 105 MMOL/L
CO2 SERPL-SCNC: 22 MMOL/L
CREAT SERPL-MCNC: 0.8 MG/DL
CRP SERPL-MCNC: 4.72 MG/L
ERYTHROCYTE [DISTWIDTH] IN BLOOD BY AUTOMATED COUNT: 13.5 %
ERYTHROCYTE [SEDIMENTATION RATE] IN BLOOD BY WESTERGREN METHOD: 46 MM/HR
EST. GFR  (AFRICAN AMERICAN): >60 ML/MIN/1.73 M^2
EST. GFR  (NON AFRICAN AMERICAN): >60 ML/MIN/1.73 M^2
GLUCOSE SERPL-MCNC: 92 MG/DL
HCT VFR BLD AUTO: 37.9 %
HGB BLD-MCNC: 12.2 G/DL
MCH RBC QN AUTO: 26.6 PG
MCHC RBC AUTO-ENTMCNC: 32.2 G/DL
MCV RBC AUTO: 83 FL
PLATELET # BLD AUTO: 335 K/UL
PMV BLD AUTO: 9.6 FL
POTASSIUM SERPL-SCNC: 3.6 MMOL/L
PROT SERPL-MCNC: 7.6 G/DL
RBC # BLD AUTO: 4.59 M/UL
RETICS/RBC NFR AUTO: 1.4 %
RHEUMATOID FACT SERPL-ACNC: <10 IU/ML
SODIUM SERPL-SCNC: 137 MMOL/L
T4 FREE SERPL-MCNC: 1.5 NG/DL
TSH SERPL DL<=0.005 MIU/L-ACNC: 0.04 UIU/ML
WBC # BLD AUTO: 8.67 K/UL

## 2018-05-01 PROCEDURE — 80053 COMPREHEN METABOLIC PANEL: CPT

## 2018-05-01 PROCEDURE — 86431 RHEUMATOID FACTOR QUANT: CPT

## 2018-05-01 PROCEDURE — 84439 ASSAY OF FREE THYROXINE: CPT

## 2018-05-01 PROCEDURE — 93226 XTRNL ECG REC<48 HR SCAN A/R: CPT | Mod: S$GLB,,, | Performed by: INTERNAL MEDICINE

## 2018-05-01 PROCEDURE — 85045 AUTOMATED RETICULOCYTE COUNT: CPT

## 2018-05-01 PROCEDURE — 86160 COMPLEMENT ANTIGEN: CPT

## 2018-05-01 PROCEDURE — 85027 COMPLETE CBC AUTOMATED: CPT

## 2018-05-01 PROCEDURE — 86160 COMPLEMENT ANTIGEN: CPT | Mod: 59

## 2018-05-01 PROCEDURE — 84443 ASSAY THYROID STIM HORMONE: CPT

## 2018-05-01 PROCEDURE — 93000 ELECTROCARDIOGRAM COMPLETE: CPT | Mod: S$GLB,,, | Performed by: PEDIATRICS

## 2018-05-01 PROCEDURE — 86038 ANTINUCLEAR ANTIBODIES: CPT

## 2018-05-01 PROCEDURE — 86141 C-REACTIVE PROTEIN HS: CPT

## 2018-05-01 PROCEDURE — 83880 ASSAY OF NATRIURETIC PEPTIDE: CPT

## 2018-05-01 PROCEDURE — 36415 COLL VENOUS BLD VENIPUNCTURE: CPT

## 2018-05-01 PROCEDURE — 99215 OFFICE O/P EST HI 40 MIN: CPT | Mod: 25,S$GLB,, | Performed by: PEDIATRICS

## 2018-05-01 PROCEDURE — 85651 RBC SED RATE NONAUTOMATED: CPT

## 2018-05-01 NOTE — PROGRESS NOTES
Sean Baez was seen in the Adult with Congenital Heart Disease Clinic at [facility], operated by [facility] on May 1, 2018.  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  I will summarize her rather complex course.      Sean underwent repair of tetralogy of Fallot in early childhood, and then a pulmonary valve replacement later in childhood.  In May 2005, she had a second pulmonary valve replacement with a 29 mm Mosiac porcine valve in the pulmonary position because of pulmonary insufficiency, and placement of an epicardial pacemaker for sinus node dysfunction, and placement of an automatic intracardiac defibrillator with an epicardial patch for ventricular tachycardia.  The AICD generator was changed in 2011.  We followed her for many years in this clinic with a diagnosis of right ventricular dysfunction, a well-functioning porcine pulmonary valve, ventricular tachycardia and sinus node dysfunction, pacemaker and AICD, tricuspid stenosis, mitral insufficiency, and mild left ventricular dysfunction. Her rhythm was well controlled over the last several years, with only one inappropriate shock about 8 years ago requiring a pacemaker adjustment.      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  During that time she had reasonable exercise tolerance, pulmonary valve function and no arrhythmias.      At a clinic visit one year ago in April, 2017, she was feeling well.  Physical exam showed a grade 2 to 3/6 systolic ejection murmur best heard at the left mid to upper sternal border and over the precordium, and a newly heard diastolic rumble at the left lower sternal border. The liver edge was 2 cm below the right  costal margin.  Pulses were 2+ in brachial and 1+ in femoral, although it was difficult to feel her pulse is secondary to obesity.  There was no peripheral edema.  An EKG showed sinus rhythm at 68 bpm, normal atrial and ventricular forces, and T-wave inversions in lead 1 V5 and V6 suggestive of ischemia (unchanged).  An echocardiogram showed an aortic root of 30 mm, the left atrium was dilated at 44 mm, (no old values for comparison), the right ventricle was 34 mm which was slightly increased from 32 mm, and the fractional area change of 30%, showed mildly reduced function which had improved.  The interventricular septum measured 11 on the left ventricular posterior wall 12 mm which are at the upper limits of normal, the left ventricular internal dimension in diastole was 52 and in systole 36 mm giving him measured ejection fraction of 57%.  The ejection fraction was slightly decreased but improved since the previous value.  The pulmonary valve annulus measured 19 mm. There was slight paradoxic motion of the interventricular septum, with otherwise normal-appearing right and left ventricular function.  The tricuspid valve domed in diastole.  The pulmonary valve leaflets could be seen and the pulmonary annulus of 19 mm was probably underestimated.  The right atrium was dilated at 61 x 57 mm.  The left pulmonary artery measured 12 mm and the right pulmonary artery could not be seen.  The aortic arch was left-sided and unobstructed.  The gradient across the tricuspid valve was 9 peak and 4 mean mmHg gradient indicating mild stenosis, unchanged.  There was trivial tricuspid regurgitation with a regurgitant gradient of 24 mmHg suggesting normal right ventricular pressure.  The gradient across the porcine pulmonary valve was 22 peak and 13 mean mmHg showing trivial obstruction, and there was trivial pulmonary insufficiency.  There was mild mitral insufficiency and no residual ventricular septal defect.  An exercise stress  test showed karma Harris exercised 7 minutes and 30 seconds of the Sam protocol, and then stopped due to fatigue and leg cramps.  Baseline blood pressure was 108/77 mmHg and then increased to 159/68 mmHg, which may be an underestimate, before returning to baseline.  Baseline heart rate was 68 bpm and increased to 147 bpm before returning to baseline.  There were initially T-wave inversions in lead I V5 and V6, which then reverted to upright during exercise, before becoming negative again in recovery.  There were no other ST segment changes or arrhythmias.      At that visit, I felt  that Steven was stable with her complex congenital heart disease.  Her right and left ventricular function had improved.  Her porcine pulmonary valve was functioning well with only mild stenosis and insufficiency.  She had mild tricuspid stenosis with significant right atrial dilatation, also unchanged.  She had left atrial dilatation which I felt was secondary to her mitral insufficiency and perhaps some diastolic dysfunction, also unchanged.  Per her report, her pacemaker was functioning properly and her rhythm was controlled on her current dose of metoprolol.  I requested records from her electrophysiologist in [place]. No cardiac intervention was recommended  A CBC, CMP, BNP, and lipid profile were drawn which showed a normal CBC, and normal CMP, a BNP of 82 pg/mL, an elevated total cholesterol of 220 mg/dL, a low HDL cholesterol of 36 mg/dL, normal triglycerides, and an elevated LDL cholesterol of 168 mg/dL.  A chest x-ray was obtained at that visit which showed mild cardiomegaly with normal vascularity, epicardial pacing leads on the right atrium and right ventricle and the AICD patch at the apex.  Sean reported that she had become engaged and desired pregnancy in the future.  At that visit I felt she would be able to tolerate a pregnancy.       At a clinic in November, 2017, Sean was not feeling well.  Approximately 3 months  prior, while walking on the beach, she had the sudden onset of fatigue, shortness of breath, palpitations and sweating, but no chest pain.  Although she sat down and went into air-conditioning, the fatigue, shortness of breath and sweating persisted for another 30 minutes.  Following that, she has noticed an increase in fatigue and exercise intolerance.  She has not participated in any exercise for that reason.  She reported these were the same symptoms as before her last valve replacement.  Sean also had a very busy life and lots of stresses.  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  She reported increased fatigue with these activities and decreased energy.  She had not had a pacemaker check in 9 months.  She was recently started on atorvastatin for elevated cholesterol, which was her only medication change.  She was taking metoprolol succinate 50 mg by mouth daily, enalapril 5 mg in the morning and 2.5 mg at night, aspirin 81 mg by mouth daily, atorvastatin 20 mg by mouth daily, Zoloft 100 mg by mouth daily, and venlafaxine 150 mg by mouth daily.  Her exam showed she had gained weight to 117 kg, a 2 kg weight increase.  Her blood pressure in the right arm was 119/76 mmHg.  Pulse was 91 bpm and pulsed oximetry in room air was 98%.  First heart sound was normal and second heart sound was widely split. There was a grade 2/6 systolic ejection murmur best heard at the left upper sternal border and down the left sternal border.  A diastolic rumble was heard.  The liver was 2 cm below the right costal margin and unchanged.  The pacemaker was palpated in the upper mid abdomen.  Pulses were 2+ bilaterally except for 1+ in the right femoral.  There was no peripheral edema.     EKG showed sinus rhythm at a rate  of 73 bpm with first-degree block (386 ms).  T-wave inversions in leads II and aVF, normal ventricular forces, and T wave inversions in leads V1 and V5, with T-wave flattening in V6.  Compared to the previous EKG, it was no change.     Echocardiogram showed the anatomy for tetralogy of Fallot status post complete repair and status post a pulmonary valve replacement.  It should be mentioned that the heart was difficult to visualize and images were of poor quality chewer attributed to obesity.  The aortic root measured 30 mm, unchanged, left atrium 35 mm, improved, and right ventricle 34 mm, unchanged.  The interventricular septum measured 11 and the left ventricular posterior wall 11 mm both of which were at the upper limits of normal.  The left ventricular internal dimension in diastole was 56 and in systole 43 mm giving a calculated ejection fraction of 46% which has decreased since the previous study.  There was flattening of septal wall motion.  The left ventricular posterior wall bowed posteriorly.  The pulmonary valve leaflets were difficult to see.  The tricuspid valve leaflets were difficult to see, but the tricuspid valve annulus was subjectively smaller than the mitral valve annulus.   Right ventricular function was judged subjectively to be mildly depressed, but the fractional area change was measured at 30%, which is within normal limits.   The branch pulmonary arteries could not be imaged.  The aortic arch was left-sided and unobstructed.  The gradient across the tricuspid valve was 10 peak and 5 mean mmHg indicating mild tricuspid stenosis, unchanged.  The gradient across the bioprosthetic pulmonary valve was 23 peak mmHg, unchanged.  Trivial pulmonary insufficiency.  No tricuspid insufficiency.  No aortic stenosis or aortic insufficiency.  Trivial mitral insufficiency, improved.  No aortic arch obstruction.       The pacemaker was then downloaded by the [employer] representative, and it showed that she  is atrially paced 6% of the time with no ventricular pacing.  She had a prolonged AV interval of 370 ms.  Her underlying rhythm was sinus bradycardia with a ventricular rate in the 40s.  She had no abnormal tachycardias greater than 150 bpm.  Her atrial and ventricular thresholds were good.  She had less than one year life on her battery.  We adjusted her pacemaker settings to have rates detected (monitor zone) greater than 130 bpm for 32 beats in duration.     At that visit,  my impression was Sean had a decrease in exercise tolerance and an increase in fatigue which was cardiac related.  She was difficult to evaluate by transthoracic echocardiography, probably secondary to obesity. I felt  her episode was secondary to an increase in tricuspid stenosis or bioprosthetic pulmonary valve dysfunction, and a decrease in left ventricular function.  Her symptoms may have been exacerbated by the stresses of finishing her residency, moving across the country, starting a new job, and planning a wedding.       Therefore, I began Sean on coenzyme Q 10 100 mg by mouth twice a day, for her left ventricular dysfunction, right ventricular dysfunction, and because she was started on a statin drug.   I presented her in cath/surgery conference for cardiac catheterization, where her tricuspid valve, pulmonary valve and coronary arteries could be evaluated, and possibly a Serina valve could be implanted and or a tricuspid valve balloon.  At the same time, I wanted her to have a transesophageal echocardiogram since her transthoracic imaging was poor.   I kept her on the same doses of her other medications, enalapril metoprolol, atorvastatin, and aspirin.    Laboratory work which was obtained at that visit which subsequently showed a normal CBC with the exception of a mildly elevated platelet count of 352,000, and a normal CMP.  Of note, her BNP was mildly elevated to  162 pg/mL, compared to the previous value.  A free T4 was  normal at 0.9 ng/dL, but her free T3 was decreased at 1.8 pg/mL, although her TSH was normal at 1.4 to micro-units per milliliter.  A cholesterol profile was repeated now that she was on atorvastatin, and that showed the cholesterol had decreased to 179 mg/dL, and the LDL cholesterol had decreased to 123.4 mg/dL, which were now normal, although her HDL remained decreased at 37 mg/dL.  Sean then saw her internal medicine doctor who began her on thyroid replacement hormone.        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  A catheterization and possible Serina valve implantation has been scheduled for February 6, 2018.  Her wedding was March 10, 2018.  Her work schedule was rather demanding, with 7-9 days in-house as a hospitalist, followed by 7 days off.   Sean was feeling better than she was at her last clinic visit, and in particular had less fatigue.  She was still short of breath with climbing stairs, but has not had any further episodes of sudden onset of shortness of breath, weakness, sweating, palpitations or chest pain.  She had started a diet and light exercise program.  She was taking metoprolol XL 50 mg by mouth daily, enalapril 5 mg in the morning and 2.5 mg at night, aspirin 81 mg by mouth daily, coenzyme Q10 200 mg by mouth twice a day, atorvastatin 20 mg by mouth daily, Synthroid 150 µg by mouth daily, Zoloft 100 mg by mouth daily, and had weaned her Effexor down to 75 mg by mouth daily.     Exam revealed an obese but very pleasant and otherwise healthy appearing young woman in no acute distress.  Height was 175.3 cm and weight was 116.8 kg, a 1 kg weight decrease since her last clinic visit.   First heart sound was normal and second heart sound was widely split.  The was a grade 2/6 systolic ejection murmur heard best at the  left upper sternal border, and faintly over the precordium.  Diastole was clear (the previous diastolic rumble had resolved).  The liver edge was 2 cm below the right costal margin and unchanged.  Pulses were 2+ bilaterally.  There was no peripheral edema.     EKG showed an atrially paced rhythm at a rate of 68 bpm with a prolonged UT interval of 112 ms, with right ventricular conduction delay and negative T waves in the left precordial leads, unchanged.     Echocardiogram showed the anatomy for status post repair of tetralogy of Fallot and placement of a bioprosthetic valve in the pulmonary position.  The aortic root was mildly dilated at 31 mm, unchanged, the left atrium had increased to 45 mm, and the right ventricle measured 29 mm, unchanged.  The interventricular septum measured 12 and the left ventricular posterior wall 12 mm which were at the upper limits of normal.  The left ventricular internal dimension in diastole was 53 and in systole 37 mm giving a calculated ejection fraction of 58%, which had improved significantly from her previous ejection fraction of 46% 2 months ago.  The right ventricle was not seen well enough quantitate function, but subjectively appeared improved since the previous visit.  They bioprosthetic valve was poorly seen in the pulmonary position, and the leaflets were thick and moving poorly.  The right atrium was significantly dilated at 54 x 53 mm.  The tricuspid valve was not seen well enough to measure the annulus.  pulmonary arteries could not be imaged.  The aortic arch was left-sided  The gradient across the tricuspid valve was 11 peak and 5 mean millimeters of mercury indicating mild stenosis which was unchanged.  The gradient across the right ventricular outflow tract was 27 mmHg peak.  There was mild pulmonary insufficiency. There was trivial tricuspid insufficiency which was inadequate to estimate right ventricular pressuresKerry Estrada was evaluated by Dr. Woodson  Deborah, who felt that Sean did not have any normal tachycardia arrhythmias.  Her defibrillator, was approaching end-of-life.  Please see Dr. Cabrera's evaluation for more details.  He recommended that Sean receive monthly pacemaker/AICD evaluations to determine the best time for replacement.     My impression at that visit was Sean's left ventricular function had improved significantly since being placed on coenzyme every 10 and thyroid replacement hormone.  I believed this had led to a decrease in symptoms and an improvement in energy level.  I continued to feel, however, that Sean still required a transesophageal echocardiogram and a cardiac catheterization and possible Serina valve replacement.  I did feel, however, that this could be delayed until after her wedding, to avoid the unlikely chance of a complication that might impact her wedding.   My opinion was that waiting an additional month to perform the catheterization would not adversely affect her health.  I discussed this with Sean and her fiancé, and they were in agreement.     Sean expressed the desire to undergo pregnancy at some time in the future.  I recommended she have her cardiac catheterization and possible Serina valve implantation, and also her AICD generator change, before becoming pregnant.  I continued the same doses of her medications.   SBE prophylaxis was recommended for dental and surgical procedures.       Wilma was seen again in clinic in February, 2018.  She was generally feeling well except for one episode of tachycardia, shortness of breath and nausea that occurred out while working out with her  in the morning, and she had not yet eaten breakfast.  She had no more recurrences of these symptoms even though she has had several training sessions since then.  She otherwise felt well, and participated in all activities including exercising and working as a hospitalist, and denied chest pain, palpitations and  shortness of breath and swelling.  She had postponed her cardiac catheterization and possible Serina valve placement until after the wedding.  She continued taking metoprolol 50 mg by mouth daily, enalapril 5 mg in the morning and 2.5 mg in the evening, aspirin 81 mg by mouth daily, coenzyme Q 10 100 mg by mouth twice a day, Synthroid 150 µg by mouth daily, and Zoloft 100 mg by mouth daily.  She has been weaned off her Effexor.     Physical exam revealed an obese, pleasant and healthy-appearing woman in no acute distress.  Vital signs showed a height of 175.3 cm or 5 feet 9 inches and a weight of 119 kg or 262 lbs. 7 oz., which is a weight increase of 2.2 kg since her last clinic visit.  Blood pressure in the right arm was 107/73 mmHg, pulse 87 bpm, and pulsed oximetry in room air 98%.  Examination of the skin showed it to be pink and well perfused.  The lungs were clear.  Palpation of the precordium showed it to be normal with a well-healed midline scar, and a pacemaker palpated in the abdomen.  The liver edge was 2 cm below the right costal margin, unchanged.  Pulses were 2+ bilaterally.  There was no peripheral edema.     EKG was obtained which showed a paced atrial rhythm at 78 bpm, with right ventricular conduction delay, and T-wave inversions in the limband precordial leads suggestive of ischemia, unchanged.     Echocardiogram showed evidence for repair of tetralogy of Fallot status post pulmonary valve replacement.  Two-dimensional imaging was poor.  M-mode parameters were as follows: The aortic root measured 30 mm, left atrium 43 mm, right ventricle 26 mm, interventricular septum 12 and left ventricular posterior wall 12 mm, the left ventricular internal dimension in diastole was 56 and in systole 36 mm giving a calculated ejection fraction of 55% and these numbers are normal.  The right ventricle function was judged subjectively to be reduced but unable to be quantitated The bioprosthetic valve in the  pulmonary position was difficult to see, however thickened leaflets with poor movement were observed.  The aortic arch was left-sided and unobstructed.  The superior vena cava drains normally to the right atrium.  Doppler analysis using pulsed continuous-wave and color-flow:  The there was turbulence across the tricuspid valve with a gradient of 14 peak and 10 mean millimeters of mercury indicating mild tricuspid valve stenosis which was unchanged.  Mitral insufficiency was present which was mild.  The gradient across the bioprosthetic pulmonary valve was 32 mmHg peak in the setting of reduced right ventricular function.  No residual ventriculoseptal defect.  No aortic stenosis or aortic insufficiency. Impression: Status post repair of tetralogy of Fallot.  Poor two-dimensional imaging.  Decreased right ventricular function.  Bioprosthetic pulmonary valve with thickened and poorly moving leaflets, and a 32 mmHg peak gradient across it in the setting of reduced right ventricular function.  Mild tricuspid valve stenosis with a 14 peak and 10 mean millimeter gradient across it, unchanged.     Sean was then evaluated by electrophysiologist Dr. Chintan Cabrera, performed a download of her pacemaker, and that showed that there was a 6 beat run of ventricular tachycardia which resolved spontaneously, and that the pacemaker generator close to end-of-life.     My impression from that visit was that Sean had right ventricular dysfunction and pulmonary valve dysfunction, in particular pulmonary valve stenosis.   I felt that she requires a pulmonary valve replacement which hopefully could be performed with a Serina valve.  I believed her right ventricular function would improve after that.  She also has ventricular tachycardia, but it was self-limited and she was protected by both her metoprolol and her AICD.  The generator also required replacement.  Dr. Cabrera has recommended the metoprolol be increased to 50 mg by mouth  twice a day.   following this clinic visit, Sean called with some leg swelling, and was placed on Lasix 20 mg by mouth daily, in addition to her other medications.    Sean had an uneventful wedding and honeymoon.  She underwent replacement of her ICD generator by Dr. Chintan Cabrera on April 4, 2018.  An ICD EVERA BRANDY S  RDEB4A0: Serial No. [id number] was placed in the abdominal pocket and the previous generator removed.  She tolerated the procedure well.  Lead testing revealed:      RV Lead:       Threshold: 1.3 V / 0.4 ms       Impedance: 646 ohms       R wave: 5.9 mV  RA Lead:       Threshold: 1.3 V / 0.4 ms       Impedance: 589 ohms       P wave: .5 mV    Sean underwent transesophageal echocardiogram and cardiac catheterization under general anesthesia on April 26, 2018.  Transesophageal echocardiogram showed:    Mild right ventricular dilatation.  Paradoxic motion of the interventricular septum.    Normal left ventricle structure and size, with normal left ventricular posterior wall motion.  Normal to mildly decreased right ventricular free wall motion.  No pericardial effusion with normal-appearing pericardium.  No atrial or ventricular shunts.  Tethering of the tricuspid valve septal leaflet, with mild doming mild tricuspid valve prolapse.  Normal tricuspid valve velocity, and mild tricuspid valve insufficiency, with a 29 mmHg gradient.    Immobile and echodense posterior leaflet of the prosthetic pulmonary valve, with a peak gradient of 17 mmHg and mild pulmonary insufficiency.    Catheterization showed:    Saturations: SVC 69%, RA 72%, right pulmonary artery 69%, left pulmonary artery 69%, aortic 99%.    Pressures in mmHg:  RA mean 19, RV 50/19, RPA  40/14 mean  25, LPA 43/16 mean 26, RPCWP mean 20, LPCWP mean 20, /18, ascending aorta 101/59  mean  77, and descending aorta 99/58 mean 75.    Calculations:  Using a hemoglobin of 11.3,  the Qp equaled to Qs at 2.55 L/min/m2, which gave a  "pulmonary vascular resistance of 2.36 Wood units/m2.  The raw cardiac output was 5.73 liters per minute.    Coronary angiography was then performed which showed normal left and right coronary arteries.  No other angiography was performed.  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    Sean's postprocedure follow-up clinic visit was on May 1, 2018.  Since her discharge from the hospital several days ago, she has been feeling generally well.  She is quite upset with her diagnosis of constrictive pericarditis, in particular the the possible inability to undergo pregnancy.  She has in general noticed increasing shortness of breath with walking compared to 6 months ago.  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  Her swelling had improved after starting the Lasix, she only noticed ankle swelling when on her feet for several hours.  She also noticed an improvement in palpitations after increasing her metoprolol to 50 mg by mouth twice a day, however she continued to experience palpitations every night when lying down, which lasted for a few seconds.  She had occasional "tingling" in her left upper chest which lasted for several seconds and occurred once a week, but no chest pain.  She has not exercised in over one month.  She has continued to gain weight.    Sean is taking Lasix 20 mg by mouth daily added about 2 months ago, coenzyme every 10 100 mg by mouth twice a day, enalapril 5 mg in the morning and 2.5 mg at night, metoprolol tartrate 50 mg by mouth twice a day, Synthroid, and atorvastatin.  She is no longer taking aspirin or Zoloft.    Physical exam revealed a pleasant, obese and healthy-appearing young woman in no acute distress.  Vital signs showed a height of 175.3 cm or 5 feet 9 inches, and a weight of 121.3 kg or 267 lbs. 6 oz., which is an increase of 2 kg from her last clinic visit.  Blood pressure was " 105/64 mmHg and heart rate 65 bpm.  Pulsed oximetry was not obtained    Examination of the skin showed it to be pink and well perfused area the lungs were clear.  Palpation of the precordium showed it to be normal with a well-healed midline scar.  First heart sound was normal and second heart sound was widely split.  There was a grade 2/6 systolic ejection murmur fairly well localized to the left upper sternal border.  I no longer hear the diastolic rumble.  The liver edge was 2 cm below the right costal margin.  Pulses were 2+ bilaterally.  The wounds were healed.  There was no peripheral edema.    EKG showed sinus rhythm at 64 bpm, with first-degree block and a IN interval of 266 ms, right ventricular hypertrophy, and T-wave inversions in the inferior and precordial leads.  This EKG has not changed since the previous one, however the T-wave in lead V6 has become inverted in the last year.    I reviewed the cardiac catheterization, pressure tracings and transesophageal echocardiogram with Sean and her .  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  I feel that this is the primary cause of her symptoms of exercise intolerance, fatigue and peripheral edema.  The cause of this is perplexing.  It is possible that her AICD patch is contributing to diastolic dysfunction, and I feel she should also be worked up for pericardial and myocardial infiltrative diseases.  I continue to feel, however, that she would still benefit from a new pulmonary valve, since one of the valve leaflets is frozen, and the valve has been in for 13 years.  I do believe that the pulmonary valve stenosis, insufficiency and tricuspid valve stenosis is a minor contributor to her right-sided dysfunction and symptoms.  For now, I feel she should be medically managed.  Sean actually looks quite well today, and therefore she should be  continued on her current medical management.  I also feel that her persistent weight gain and obesity is contributing to exercise intolerance.  If she cannot lose weight herself, she may be a candidate for a gastric bypass operation.  Sean is also complaining of palpitations and this needs to be characterized.    Therefore, Sean should stay on the same doses of her Lasix, coenzyme every 10, enalapril, Synthroid, and atorvastatin.  We have placed a 48 hour Holter monitor as surveillance for arrhythmias.  She should slowly again an exercise program and continue to lose weight with diet.  I have sent her for laboratory work including an GWEN, rheumatoid factor, complement, sedimentation rate, high specificity CRP, CBC, reticulocyte count, BNP, and CMP.  Sean should return to this clinic in about 6 weeks specifically for an exercise stress test if she is tolerating her exercise program.  Further disposition for the cardiac status of Sean Baez will be determined following the results of her Holter monitor, laboratory work, response to exercise, and exercise stress test.  SBE prophylaxis is in order for all dental and surgical procedures.    Thank you very much for allowing up to participate the care of your patient.       Sincerely      Chantel Saleh

## 2018-05-02 ENCOUNTER — CONFERENCE (OUTPATIENT)
Dept: PEDIATRIC CARDIOLOGY | Facility: CLINIC | Age: 33
End: 2018-05-02

## 2018-05-02 LAB — ANA SER QL IF: NORMAL

## 2018-05-02 NOTE — PROGRESS NOTES
Discussed patient in [facility] conference today. Plan discussed by team is that constrictive pericardial disease is likely responsible for the patient symptoms. Team recommends medical treatment and for patent to obtain baseline CPX, cardiac CT, and interval follow up to reassess progress/ surgical need if disease progresses. Dr. Carpenter updated regarding continuation of care and teams recommendations .

## 2018-06-14 ENCOUNTER — OFFICE VISIT (OUTPATIENT)
Dept: CARDIOLOGY | Facility: CLINIC | Age: 33
End: 2018-06-14
Payer: COMMERCIAL

## 2018-06-14 ENCOUNTER — CLINICAL SUPPORT (OUTPATIENT)
Dept: CARDIOLOGY | Facility: CLINIC | Age: 33
End: 2018-06-14
Payer: COMMERCIAL

## 2018-06-14 ENCOUNTER — OFFICE VISIT (OUTPATIENT)
Dept: PEDIATRIC CARDIOLOGY | Facility: CLINIC | Age: 33
End: 2018-06-14
Attending: PEDIATRICS
Payer: COMMERCIAL

## 2018-06-14 VITALS
DIASTOLIC BLOOD PRESSURE: 71 MMHG | OXYGEN SATURATION: 95 % | HEART RATE: 84 BPM | SYSTOLIC BLOOD PRESSURE: 107 MMHG | DIASTOLIC BLOOD PRESSURE: 71 MMHG | SYSTOLIC BLOOD PRESSURE: 107 MMHG | HEIGHT: 69 IN | HEIGHT: 69 IN | OXYGEN SATURATION: 95 % | WEIGHT: 255.94 LBS | BODY MASS INDEX: 37.91 KG/M2 | HEART RATE: 84 BPM | BODY MASS INDEX: 37.91 KG/M2 | WEIGHT: 255.94 LBS

## 2018-06-14 DIAGNOSIS — I49.1 PAC (PREMATURE ATRIAL CONTRACTION): ICD-10-CM

## 2018-06-14 DIAGNOSIS — Q21.3 TOF (TETRALOGY OF FALLOT): ICD-10-CM

## 2018-06-14 DIAGNOSIS — I36.0 NONRHEUMATIC TRICUSPID (VALVE) STENOSIS: ICD-10-CM

## 2018-06-14 DIAGNOSIS — Z95.810 ICD (IMPLANTABLE CARDIOVERTER-DEFIBRILLATOR) IN PLACE: ICD-10-CM

## 2018-06-14 DIAGNOSIS — Q21.3 TOF (TETRALOGY OF FALLOT): Primary | ICD-10-CM

## 2018-06-14 DIAGNOSIS — Z95.0 CARDIAC PACEMAKER IN SITU: ICD-10-CM

## 2018-06-14 DIAGNOSIS — Z87.74 TETRALOGY OF FALLOT S/P REPAIR: ICD-10-CM

## 2018-06-14 DIAGNOSIS — Z95.2 S/P PULMONARY VALVE REPLACEMENT: ICD-10-CM

## 2018-06-14 DIAGNOSIS — I31.1 PERICARDITIS, CONSTRICTIVE: ICD-10-CM

## 2018-06-14 DIAGNOSIS — Q24.9 ADULT CONGENITAL HEART DISEASE: ICD-10-CM

## 2018-06-14 DIAGNOSIS — Z95.810 AICD (AUTOMATIC CARDIOVERTER/DEFIBRILLATOR) PRESENT: ICD-10-CM

## 2018-06-14 DIAGNOSIS — I47.20 VT (VENTRICULAR TACHYCARDIA): Primary | ICD-10-CM

## 2018-06-14 PROCEDURE — 93303 ECHO TRANSTHORACIC: CPT | Mod: S$GLB,,, | Performed by: PEDIATRICS

## 2018-06-14 PROCEDURE — 93015 CV STRESS TEST SUPVJ I&R: CPT | Mod: S$GLB,,, | Performed by: PEDIATRICS

## 2018-06-14 PROCEDURE — 99214 OFFICE O/P EST MOD 30 MIN: CPT | Mod: 25,S$GLB,, | Performed by: PEDIATRICS

## 2018-06-14 PROCEDURE — 93320 DOPPLER ECHO COMPLETE: CPT | Mod: S$GLB,,, | Performed by: PEDIATRICS

## 2018-06-14 PROCEDURE — 3008F BODY MASS INDEX DOCD: CPT | Mod: CPTII,S$GLB,, | Performed by: PEDIATRICS

## 2018-06-14 PROCEDURE — 93283 PRGRMG EVAL IMPLANTABLE DFB: CPT | Mod: S$GLB,,, | Performed by: PEDIATRICS

## 2018-06-14 PROCEDURE — 93325 DOPPLER ECHO COLOR FLOW MAPG: CPT | Mod: S$GLB,,, | Performed by: PEDIATRICS

## 2018-06-14 PROCEDURE — 99215 OFFICE O/P EST HI 40 MIN: CPT | Mod: 25,S$GLB,, | Performed by: PEDIATRICS

## 2018-06-14 RX ORDER — FUROSEMIDE 40 MG/1
40 TABLET ORAL DAILY
COMMUNITY
End: 2019-02-14 | Stop reason: SDUPTHER

## 2018-06-14 RX ORDER — UBIQUINOL 100 MG
200 CAPSULE ORAL 2 TIMES DAILY
Qty: 62 CAPSULE | Refills: 3 | COMMUNITY
Start: 2018-06-14 | End: 2019-02-14 | Stop reason: SDUPTHER

## 2018-06-14 NOTE — PROGRESS NOTES
Thank you for referring your patient Sean Baez to the electrophysiology clinic for consultation. Please review my findings below.    CHIEF COMPLAINT: EP follow up    HISTORY OF PRESENT ILLNESS:   32 y/o female with TOF s/p repair.  She had most recent pulmonary valve replacement in 2005 and placement of epicardial dual chamber ICD with epicardial patch for sinus node dysfunction and VT. She had AICD gen change in 2011 for VT.   Seen in November 2017 by Dr. Saleh.  Still had TS but unchanged.  LV dilated and EF down to 46%.  Valve looked ok by report.  Changed rate detection to 130.   She was started on CoQ10.  History of inappropriate shock x 2 in 2005 but none since.  She was feeling very fatigued in November.  In the interim she was started on thyroid medication and CoQ10 and reports having markedly increased energy.  She had episode of sweating and really marked fatigue in August with going up steep incline and walking in sand.  She was started on thyroid medication  Sean underwent ICD generator change and pocket revision on 4/4/18 without complication.     Sean was last seen by me in April 2018 after generator change.  She had cardiac cath subsequently which demonstrated constrictive and restrictive physiology with markedly elevated LVEDP.  She had second opinion with Dr. Ny at [facility].  He agreed with medical management for now and monitor for progressive symptoms.  She reports wound healed well from generator change.  She has no palpitations.  She has no syncope or near syncope.  She has been feeling better with increase in lasix.  She has good energy and no overt activity intolerance.    REVIEW OF SYSTEMS:     GENERAL: No fever, chills,or weight loss. See HPI  SKIN: No rashes, itching or changes in color or texture of skin.  CHEST: Denies  cyanosis, wheezing, cough and sputum production. See HPI  CARDIOVASCULAR: see HPI  ABDOMEN: Appetite fine. No weight loss. Denies diarrhea,  abdominal pain, or vomiting.  PERIPHERAL VASCULAR: No claudication or cyanosis.  MUSCULOSKELETAL: No joint stiffness or swelling.   NEUROLOGIC: No history of seizures,  alteration of gait or coordination.    PAST MEDICAL HISTORY:   Past Medical History:   Diagnosis Date    Sinus node dysfunction     TOF (tetralogy of Fallot)     V-tach            FAMILY HISTORY:   Family History   Problem Relation Age of Onset    Hypertension Mother     Hyperlipidemia Mother     Hypertension Father     Heart attacks under age 50 Maternal Grandmother     Stroke Maternal Grandmother     Stroke Maternal Grandfather     COPD Paternal Grandfather          SOCIAL HISTORY:   Social History     Social History    Marital status: Single     Spouse name: N/A    Number of children: N/A    Years of education: N/A     Occupational History    Not on file.     Social History Main Topics    Smoking status: Never Smoker    Smokeless tobacco: Never Used    Alcohol use Yes      Comment: socially    Drug use: No    Sexual activity: Not on file     Other Topics Concern    Not on file     Social History Narrative    Physician at [employer].        ALLERGIES:  Review of patient's allergies indicates:  No Known Allergies    MEDICATIONS:    Current Outpatient Prescriptions:     atorvastatin (LIPITOR) 20 MG tablet, Take 20 mg by mouth once daily., Disp: , Rfl:     coQ10, ubiquinol, 100 mg Cap, Take 100 mg by mouth 2 (two) times daily., Disp: 62 capsule, Rfl: 3    enalapril (VASOTEC) 5 MG tablet, Take 5 mg by mouth once daily., Disp: , Rfl:     furosemide (LASIX) 40 MG tablet, Take 40 mg by mouth 2 (two) times daily., Disp: , Rfl:     levocetirizine (XYZAL) 5 MG tablet, Take 5 mg by mouth once daily. , Disp: , Rfl:     levothyroxine (SYNTHROID) 150 MCG tablet, Take 137 mcg by mouth before breakfast. , Disp: , Rfl:     metoprolol tartrate (LOPRESSOR) 50 MG tablet, Take 1 tablet (50 mg total) by mouth 2 (two) times  "daily., Disp: 62 tablet, Rfl: 4    montelukast (SINGULAIR) 10 mg tablet, Take 10 mg by mouth every evening., Disp: , Rfl:       PHYSICAL EXAM:   Vitals:    06/14/18 0955   BP: 107/71   Pulse: 84   SpO2: 95%   Weight: 116.1 kg (255 lb 15.3 oz)   Height: 5' 9" (1.753 m)         GENERAL: Awake, well-developed well-nourished, no apparent distress  HEENT: mucous membranes moist and pink, normocephalic atraumatic, no cranial or carotid bruits, sclera anicteric  NECK: no jugular venous distention, no lymphadenopathy  CHEST: Good air movement, clear to auscultation bilaterally  CARDIOVASCULAR: Quiet precordium, regular rate and rhythm, S1S2, no murmurs rubs or gallops  ABDOMEN: Soft, nontender nondistended, no hepatomegaly, no aortic bruits.  ICD generator incision healing well.  EXTREMITIES: Warm well perfused, 2+ radial/pedal pulses, capillary refill 2 seconds, no clubbing, cyanosis, or edema  NEURO: Alert and oriented, cooperative with exam, face symmetric, moves all extremities well    STUDIES:    ECG: Atrial paced with intact but prolonged AV conduction              T wave inversion in anterolateral and inferior leads  ICD: MDT PAWAN NAVA ICD. Battery 10.4yrs. P-wave 0.9mV, Imp 608 ohms, threshold 0.5V @ 0.4ms. R-wave 6.6mV, Imp 722 ohms, threshold 1.25V @ 0.4ms. Ap 71%,  0%. No episodes. Exercising and working. SVT/ST episodes recorded with HR's 140-154bpm.    ASSESSMENT:  34 y/o female with TOF s/p repair and tricuspid stenosis with ICD.  She is s/p ICD generator change. She had cath demonstrating constrictive physiology thought likely secondary to epicardial ICD patch.  Discussed optimizing medical management vs. Option of surgical removal of ICD patch.    PLAN:   EP f/u in 6 months with ECG.  Optimize diuretics and afterload reduction  Call for worsening symptoms.  Keep follow up with Dr. Saleh as planned.  Continue Metoprolol XL to 50mg BID  Call for chest pain, syncope, palpitations, or any other " questions or concerns.      Time Spent: 30 (min) with over 50% in direct patient and family consultation regarding management of ICD and arrhythmias and constrictive physiology with congenital heart disease.     The patient's doctor will be notified via EPIC/FAX    I hope this brings you up-to-date on Brogue Patriciaphuong Baez  Please contact me with any questions or concerns.    Chintan Cabrera MD  Pediatric and Adult Congenital Electrophysiologist  Pediatric Cardiologist

## 2018-06-14 NOTE — LETTER
June 19, 2018        Dk Persaud MD  [address]  [facility] [id number]             [place] - Pediatric Cardiology  [address] [place] [id number]  Phone: [phone]  Fax: [phone]   Patient: Sean Baez   MR Number: [id number]   YOB: 1985   Date of Visit: 6/14/2018       Dear Dr. Persaud:    Thank you for referring Sean Baez to me for evaluation. Attached you will find relevant portions of my assessment and plan of care.    If you have questions, please do not hesitate to call me. I look forward to following Sean Baez along with you.    Sincerely,      Chintan Cabrera MD            CC  No Recipients    Enclosure

## 2018-06-14 NOTE — LETTER
June 19, 2018        Chantel Saleh MD  [facility]  [address] [place] [id number]             [place] - Pediatric Cardiology  [address] [place] [id number]  Phone: [phone]  Fax: [phone]   Patient: Sean Baez   MR Number: [id number]   YOB: 1985   Date of Visit: 6/14/2018       Dear Dr. Saleh:    Thank you for referring Sean Baez to me for evaluation. Attached you will find relevant portions of my assessment and plan of care.    If you have questions, please do not hesitate to call me. I look forward to following Sean Baez along with you.    Sincerely,      Chintan Cabrera MD            CC  MD Dilan Santiagoosure

## 2018-06-14 NOTE — PROGRESS NOTES
Sean Baez was seen in the Adult with Congenital Heart Disease Clinic at [facility] on June , 2018.  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  I will summarize her rather complex course.       Sean underwent repair of tetralogy of Fallot in early childhood, and then a pulmonary valve replacement later in childhood.  In May 2005, she had a second pulmonary valve replacement with a 29 mm Mosiac porcine valve in the pulmonary position because of pulmonary insufficiency, and placement of an epicardial pacemaker for sinus node dysfunction, and placement of an automatic intracardiac defibrillator with an epicardial patch for ventricular tachycardia.  The AICD generator was changed in 2011.  We followed her for many years in this clinic with a diagnosis of right ventricular dysfunction, a well-functioning porcine pulmonary valve, ventricular tachycardia and sinus node dysfunction, pacemaker and AICD, tricuspid stenosis, mitral insufficiency, and mild left ventricular dysfunction. Her rhythm was well controlled over the last several years, with only one inappropriate shock about 8 years ago requiring a pacemaker adjustment.       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  During that time she had reasonable exercise tolerance, pulmonary valve function and no arrhythmias.       At a clinic visit one year ago in April, 2017, she was feeling well.  Physical exam showed a grade 2 to 3/6 systolic ejection murmur best heard at the left mid to upper sternal border and over the precordium, and a newly heard diastolic rumble at the left lower sternal border. The liver edge was 2 cm below the right  costal margin.  Pulses were 2+ in brachial and 1+ in femoral, although it was difficult to feel her pulse is secondary to obesity.  There was no peripheral edema.  An EKG showed sinus rhythm at 68 bpm, normal atrial and ventricular forces, and T-wave inversions in lead 1 V5 and V6 suggestive of ischemia (unchanged).  An echocardiogram showed an aortic root of 30 mm, the left atrium was dilated at 44 mm, (no old values for comparison), the right ventricle was 34 mm which was slightly increased from 32 mm, and the fractional area change of 30%, showed mildly reduced function which had improved.  The interventricular septum measured 11 on the left ventricular posterior wall 12 mm which are at the upper limits of normal, the left ventricular internal dimension in diastole was 52 and in systole 36 mm giving him measured ejection fraction of 57%.  The ejection fraction was slightly decreased but improved since the previous value.  The pulmonary valve annulus measured 19 mm. There was slight paradoxic motion of the interventricular septum, with otherwise normal-appearing right and left ventricular function.  The tricuspid valve domed in diastole.  The pulmonary valve leaflets could be seen and the pulmonary annulus of 19 mm was probably underestimated.  The right atrium was dilated at 61 x 57 mm.  The left pulmonary artery measured 12 mm and the right pulmonary artery could not be seen.  The aortic arch was left-sided and unobstructed.  The gradient across the tricuspid valve was 9 peak and 4 mean mmHg gradient indicating mild stenosis, unchanged.  There was trivial tricuspid regurgitation with a regurgitant gradient of 24 mmHg suggesting normal right ventricular pressure.  The gradient across the porcine pulmonary valve was 22 peak and 13 mean mmHg showing trivial obstruction, and there was trivial pulmonary insufficiency.  There was mild mitral insufficiency and no residual ventricular septal defect.  An exercise stress  test showed karma Harris exercised 7 minutes and 30 seconds of the Sam protocol, and then stopped due to fatigue and leg cramps.  Baseline blood pressure was 108/77 mmHg and then increased to 159/68 mmHg, which may be an underestimate, before returning to baseline.  Baseline heart rate was 68 bpm and increased to 147 bpm before returning to baseline.  There were initially T-wave inversions in lead I V5 and V6, which then reverted to upright during exercise, before becoming negative again in recovery.  There were no other ST segment changes or arrhythmias.      At that visit, I felt  that Steven was stable with her complex congenital heart disease.  Her right and left ventricular function had improved.  Her porcine pulmonary valve was functioning well with only mild stenosis and insufficiency.  She had mild tricuspid stenosis with significant right atrial dilatation, also unchanged.  She had left atrial dilatation which I felt was secondary to her mitral insufficiency and perhaps some diastolic dysfunction, also unchanged.  Per her report, her pacemaker was functioning properly and her rhythm was controlled on her current dose of metoprolol.  I requested records from her electrophysiologist in [place]. No cardiac intervention was recommended  A CBC, CMP, BNP, and lipid profile were drawn which showed a normal CBC, and normal CMP, a BNP of 82 pg/mL, an elevated total cholesterol of 220 mg/dL, a low HDL cholesterol of 36 mg/dL, normal triglycerides, and an elevated LDL cholesterol of 168 mg/dL.  A chest x-ray was obtained at that visit which showed mild cardiomegaly with normal vascularity, epicardial pacing leads on the right atrium and right ventricle and the AICD patch at the apex.  Sean reported that she had become engaged and desired pregnancy in the future.  At that visit I felt she would be able to tolerate a pregnancy.       At a clinic in November, 2017, Sean was not feeling well.  Approximately 3 months  prior, while walking on the beach, she had the sudden onset of fatigue, shortness of breath, palpitations and sweating, but no chest pain.  Although she sat down and went into air-conditioning, the fatigue, shortness of breath and sweating persisted for another 30 minutes.  Following that, she has noticed an increase in fatigue and exercise intolerance.  She has not participated in any exercise for that reason.  She reported these were the same symptoms as before her last valve replacement.  Sean also had a very busy life and lots of stresses.  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  She reported increased fatigue with these activities and decreased energy.  She had not had a pacemaker check in 9 months.  She was recently started on atorvastatin for elevated cholesterol, which was her only medication change.  She was taking metoprolol succinate 50 mg by mouth daily, enalapril 5 mg in the morning and 2.5 mg at night, aspirin 81 mg by mouth daily, atorvastatin 20 mg by mouth daily, Zoloft 100 mg by mouth daily, and venlafaxine 150 mg by mouth daily.  Her exam showed she had gained weight to 117 kg, a 2 kg weight increase.  Her blood pressure in the right arm was 119/76 mmHg.  Pulse was 91 bpm and pulsed oximetry in room air was 98%.  First heart sound was normal and second heart sound was widely split. There was a grade 2/6 systolic ejection murmur best heard at the left upper sternal border and down the left sternal border.  A diastolic rumble was heard.  The liver was 2 cm below the right costal margin and unchanged.  The pacemaker was palpated in the upper mid abdomen.  Pulses were 2+ bilaterally except for 1+ in the right femoral.  There was no peripheral edema.     EKG showed sinus rhythm at a rate  of 73 bpm with first-degree block (386 ms).  T-wave inversions in leads II and aVF, normal ventricular forces, and T wave inversions in leads V1 and V5, with T-wave flattening in V6.  Compared to the previous EKG, it was no change.     Echocardiogram showed the anatomy for tetralogy of Fallot status post complete repair and status post a pulmonary valve replacement.  It should be mentioned that the heart was difficult to visualize and images were of poor quality chewer attributed to obesity.  The aortic root measured 30 mm, unchanged, left atrium 35 mm, improved, and right ventricle 34 mm, unchanged.  The interventricular septum measured 11 and the left ventricular posterior wall 11 mm both of which were at the upper limits of normal.  The left ventricular internal dimension in diastole was 56 and in systole 43 mm giving a calculated ejection fraction of 46% which has decreased since the previous study.  There was flattening of septal wall motion.  The left ventricular posterior wall bowed posteriorly.  The pulmonary valve leaflets were difficult to see.  The tricuspid valve leaflets were difficult to see, but the tricuspid valve annulus was subjectively smaller than the mitral valve annulus.   Right ventricular function was judged subjectively to be mildly depressed, but the fractional area change was measured at 30%, which is within normal limits.   The branch pulmonary arteries could not be imaged.  The aortic arch was left-sided and unobstructed.  The gradient across the tricuspid valve was 10 peak and 5 mean mmHg indicating mild tricuspid stenosis, unchanged.  The gradient across the bioprosthetic pulmonary valve was 23 peak mmHg, unchanged.  Trivial pulmonary insufficiency.  No tricuspid insufficiency.  No aortic stenosis or aortic insufficiency.  Trivial mitral insufficiency, improved.  No aortic arch obstruction.       The pacemaker was then downloaded by the [employer] representative, and it showed that she  is atrially paced 6% of the time with no ventricular pacing.  She had a prolonged AV interval of 370 ms.  Her underlying rhythm was sinus bradycardia with a ventricular rate in the 40s.  She had no abnormal tachycardias greater than 150 bpm.  Her atrial and ventricular thresholds were good.  She had less than one year life on her battery.  We adjusted her pacemaker settings to have rates detected (monitor zone) greater than 130 bpm for 32 beats in duration.     At that visit,  my impression was Sean had a decrease in exercise tolerance and an increase in fatigue which was cardiac related.  She was difficult to evaluate by transthoracic echocardiography, probably secondary to obesity. I felt  her episode was secondary to an increase in tricuspid stenosis or bioprosthetic pulmonary valve dysfunction, and a decrease in left ventricular function.  Her symptoms may have been exacerbated by the stresses of finishing her residency, moving across the country, starting a new job, and planning a wedding.       Therefore, I began Sean on coenzyme Q 10 100 mg by mouth twice a day, for her left ventricular dysfunction, right ventricular dysfunction, and because she was started on a statin drug.   I presented her in cath/surgery conference for cardiac catheterization, where her tricuspid valve, pulmonary valve and coronary arteries could be evaluated, and possibly a Serina valve could be implanted and or a tricuspid valve balloon.  At the same time, I wanted her to have a transesophageal echocardiogram since her transthoracic imaging was poor.   I kept her on the same doses of her other medications, enalapril metoprolol, atorvastatin, and aspirin.    Laboratory work which was obtained at that visit which subsequently showed a normal CBC with the exception of a mildly elevated platelet count of 352,000, and a normal CMP.  Of note, her BNP was mildly elevated to  162 pg/mL, compared to the previous value.  A free T4 was  normal at 0.9 ng/dL, but her free T3 was decreased at 1.8 pg/mL, although her TSH was normal at 1.4 to micro-units per milliliter.  A cholesterol profile was repeated now that she was on atorvastatin, and that showed the cholesterol had decreased to 179 mg/dL, and the LDL cholesterol had decreased to 123.4 mg/dL, which were now normal, although her HDL remained decreased at 37 mg/dL.  Sean then saw her internal medicine doctor who began her on thyroid replacement hormone.        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  A catheterization and possible Serina valve implantation has been scheduled for February 6, 2018.  Her wedding was March 10, 2018.  Her work schedule was rather demanding, with 7-9 days in-house as a hospitalist, followed by 7 days off.   Sean was feeling better than she was at her last clinic visit, and in particular had less fatigue.  She was still short of breath with climbing stairs, but has not had any further episodes of sudden onset of shortness of breath, weakness, sweating, palpitations or chest pain.  She had started a diet and light exercise program.  She was taking metoprolol XL 50 mg by mouth daily, enalapril 5 mg in the morning and 2.5 mg at night, aspirin 81 mg by mouth daily, coenzyme Q10 200 mg by mouth twice a day, atorvastatin 20 mg by mouth daily, Synthroid 150 µg by mouth daily, Zoloft 100 mg by mouth daily, and had weaned her Effexor down to 75 mg by mouth daily.     Exam revealed an obese but very pleasant and otherwise healthy appearing young woman in no acute distress.  Height was 175.3 cm and weight was 116.8 kg, a 1 kg weight decrease since her last clinic visit.   First heart sound was normal and second heart sound was widely split.  The was a grade 2/6 systolic ejection murmur heard best at the  left upper sternal border, and faintly over the precordium.  Diastole was clear (the previous diastolic rumble had resolved).  The liver edge was 2 cm below the right costal margin and unchanged.  Pulses were 2+ bilaterally.  There was no peripheral edema.     EKG showed an atrially paced rhythm at a rate of 68 bpm with a prolonged MI interval of 112 ms, with right ventricular conduction delay and negative T waves in the left precordial leads, unchanged.     Echocardiogram showed the anatomy for status post repair of tetralogy of Fallot and placement of a bioprosthetic valve in the pulmonary position.  The aortic root was mildly dilated at 31 mm, unchanged, the left atrium had increased to 45 mm, and the right ventricle measured 29 mm, unchanged.  The interventricular septum measured 12 and the left ventricular posterior wall 12 mm which were at the upper limits of normal.  The left ventricular internal dimension in diastole was 53 and in systole 37 mm giving a calculated ejection fraction of 58%, which had improved significantly from her previous ejection fraction of 46% 2 months ago.  The right ventricle was not seen well enough quantitate function, but subjectively appeared improved since the previous visit.  They bioprosthetic valve was poorly seen in the pulmonary position, and the leaflets were thick and moving poorly.  The right atrium was significantly dilated at 54 x 53 mm.  The tricuspid valve was not seen well enough to measure the annulus.  pulmonary arteries could not be imaged.  The aortic arch was left-sided  The gradient across the tricuspid valve was 11 peak and 5 mean millimeters of mercury indicating mild stenosis which was unchanged.  The gradient across the right ventricular outflow tract was 27 mmHg peak.  There was mild pulmonary insufficiency. There was trivial tricuspid insufficiency which was inadequate to estimate right ventricular pressuresKerry Estrada was evaluated by Dr. Woodson  Deborah, who felt that Sean did not have any normal tachycardia arrhythmias.  Her defibrillator, was approaching end-of-life.  Please see Dr. Cabrera's evaluation for more details.  He recommended that Sean receive monthly pacemaker/AICD evaluations to determine the best time for replacement.     My impression at that visit was Sean's left ventricular function had improved significantly since being placed on coenzyme every 10 and thyroid replacement hormone.  I believed this had led to a decrease in symptoms and an improvement in energy level.  I continued to feel, however, that Sean still required a transesophageal echocardiogram and a cardiac catheterization and possible Serina valve replacement.  I did feel, however, that this could be delayed until after her wedding, to avoid the unlikely chance of a complication that might impact her wedding.   My opinion was that waiting an additional month to perform the catheterization would not adversely affect her health.  I discussed this with Sean and her fiancé, and they were in agreement.     Sean expressed the desire to undergo pregnancy at some time in the future.  I recommended she have her cardiac catheterization and possible Serina valve implantation, and also her AICD generator change, before becoming pregnant.  I continued the same doses of her medications.   SBE prophylaxis was recommended for dental and surgical procedures.       Wilma was seen again in clinic in February, 2018.  She was generally feeling well except for one episode of tachycardia, shortness of breath and nausea that occurred out while working out with her  in the morning, and she had not yet eaten breakfast.  She had no more recurrences of these symptoms even though she has had several training sessions since then.  She otherwise felt well, and participated in all activities including exercising and working as a hospitalist, and denied chest pain, palpitations and  shortness of breath and swelling.  She had postponed her cardiac catheterization and possible Serina valve placement until after the wedding.  She continued taking metoprolol 50 mg by mouth daily, enalapril 5 mg in the morning and 2.5 mg in the evening, aspirin 81 mg by mouth daily, coenzyme Q 10 100 mg by mouth twice a day, Synthroid 150 µg by mouth daily, and Zoloft 100 mg by mouth daily.  She has been weaned off her Effexor.     Physical exam revealed an obese, pleasant and healthy-appearing woman in no acute distress.  Vital signs showed a height of 175.3 cm or 5 feet 9 inches and a weight of 119 kg or 262 lbs. 7 oz., which is a weight increase of 2.2 kg since her last clinic visit.  Blood pressure in the right arm was 107/73 mmHg, pulse 87 bpm, and pulsed oximetry in room air 98%.  Examination of the skin showed it to be pink and well perfused.  The lungs were clear.  Palpation of the precordium showed it to be normal with a well-healed midline scar, and a pacemaker palpated in the abdomen.  The liver edge was 2 cm below the right costal margin, unchanged.  Pulses were 2+ bilaterally.  There was no peripheral edema.     EKG was obtained which showed a paced atrial rhythm at 78 bpm, with right ventricular conduction delay, and T-wave inversions in the limband precordial leads suggestive of ischemia, unchanged.     Echocardiogram showed evidence for repair of tetralogy of Fallot status post pulmonary valve replacement.  Two-dimensional imaging was poor.  M-mode parameters were as follows: The aortic root measured 30 mm, left atrium 43 mm, right ventricle 26 mm, interventricular septum 12 and left ventricular posterior wall 12 mm, the left ventricular internal dimension in diastole was 56 and in systole 36 mm giving a calculated ejection fraction of 55% and these numbers are normal.  The right ventricle function was judged subjectively to be reduced but unable to be quantitated The bioprosthetic valve in the  pulmonary position was difficult to see, however thickened leaflets with poor movement were observed.  The aortic arch was left-sided and unobstructed.  The superior vena cava drains normally to the right atrium.  Doppler analysis using pulsed continuous-wave and color-flow:  The there was turbulence across the tricuspid valve with a gradient of 14 peak and 10 mean millimeters of mercury indicating mild tricuspid valve stenosis which was unchanged.  Mitral insufficiency was present which was mild.  The gradient across the bioprosthetic pulmonary valve was 32 mmHg peak in the setting of reduced right ventricular function.  No residual ventriculoseptal defect.  No aortic stenosis or aortic insufficiency. Impression: Status post repair of tetralogy of Fallot.  Poor two-dimensional imaging.  Decreased right ventricular function.  Bioprosthetic pulmonary valve with thickened and poorly moving leaflets, and a 32 mmHg peak gradient across it in the setting of reduced right ventricular function.  Mild tricuspid valve stenosis with a 14 peak and 10 mean millimeter gradient across it, unchanged.     Sean was then evaluated by electrophysiologist Dr. Chintan Cabrera, performed a download of her pacemaker, and that showed that there was a 6 beat run of ventricular tachycardia which resolved spontaneously, and that the pacemaker generator close to end-of-life.     My impression from that visit was that Sean had right ventricular dysfunction and pulmonary valve dysfunction, in particular pulmonary valve stenosis.   I felt that she requires a pulmonary valve replacement which hopefully could be performed with a Serina valve.  I believed her right ventricular function would improve after that.  She also has ventricular tachycardia, but it was self-limited and she was protected by both her metoprolol and her AICD.  The generator also required replacement.  Dr. Cabrera has recommended the metoprolol be increased to 50 mg by mouth  twice a day.   following this clinic visit, Sean called with some leg swelling, and was placed on Lasix 20 mg by mouth daily, in addition to her other medications.     Sean had an uneventful wedding and honeymoon.  She underwent replacement of her ICD generator by Dr. Chintan Cabrera on April 4, 2018.  An ICD EVERA BRANDY S  [id number]: Serial No. [id number] was placed in the abdominal pocket and the previous generator removed.  She tolerated the procedure well.  Lead testing revealed:      RV Lead:       Threshold: 1.3 V / 0.4 ms       Impedance: 646 ohms       R wave: 5.9 mV  RA Lead:       Threshold: 1.3 V / 0.4 ms       Impedance: 589 ohms       P wave: .5 mV     Sean underwent transesophageal echocardiogram and cardiac catheterization under general anesthesia on April 26, 2018.  Transesophageal echocardiogram showed:     Mild right ventricular dilatation.  Paradoxic motion of the interventricular septum.    Normal left ventricle structure and size, with normal left ventricular posterior wall motion.  Normal to mildly decreased right ventricular free wall motion.  No pericardial effusion with normal-appearing pericardium.  No atrial or ventricular shunts.  Tethering of the tricuspid valve septal leaflet, with mild doming mild tricuspid valve prolapse.  Normal tricuspid valve velocity, and mild tricuspid valve insufficiency, with a 29 mmHg gradient.    Immobile and echodense posterior leaflet of the prosthetic pulmonary valve, with a peak gradient of 17 mmHg and mild pulmonary insufficiency.     Catheterization showed:     Saturations: SVC 69%, RA 72%, right pulmonary artery 69%, left pulmonary artery 69%, aortic 99%.    Pressures in mmHg:  RA mean 19, RV 50/19, RPA  40/14 mean  25, LPA 43/16 mean 26, RPCWP mean 20, LPCWP mean 20, /18, ascending aorta 101/59  mean  77, and descending aorta 99/58 mean 75.    Calculations:  Using a hemoglobin of 11.3,  the Qp equaled to Qs at 2.55 L/min/m2, which gave a  "pulmonary vascular resistance of 2.36 Wood units/m2.  The raw cardiac output was 5.73 liters per minute.     Coronary angiography was then performed which showed normal left and right coronary arteries.  No other angiography was performed.  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     Sean's postprocedure follow-up clinic visit was on May 1, 2018.  Since her discharge from the hospital several days ago, she has been feeling generally well.  She is quite upset with her diagnosis of constrictive pericarditis, in particular the the possible inability to undergo pregnancy.  She has in general noticed increasing shortness of breath with walking compared to 6 months ago.  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  Her swelling had improved after starting the Lasix, she only noticed ankle swelling when on her feet for several hours.  She also noticed an improvement in palpitations after increasing her metoprolol to 50 mg by mouth twice a day, however she continued to experience palpitations every night when lying down, which lasted for a few seconds.  She had occasional "tingling" in her left upper chest which lasted for several seconds and occurred once a week, but no chest pain.  She has not exercised in over one month.  She has continued to gain weight.     Sean is taking Lasix 20 mg by mouth daily added about 2 months ago, coenzyme every 10 100 mg by mouth twice a day, enalapril 5 mg in the morning and 2.5 mg at night, metoprolol tartrate 50 mg by mouth twice a day, Synthroid, and atorvastatin.  She is no longer taking aspirin or Zoloft.     Physical exam revealed a pleasant, obese and healthy-appearing young woman in no acute distress.  Vital signs showed a height of 175.3 cm or 5 feet 9 inches, and a weight of 121.3 kg or 267 lbs. 6 oz., which is an increase of 2 kg from her last clinic visit.  Blood pressure " was 105/64 mmHg and heart rate 65 bpm.  Pulsed oximetry was not obtained     Examination of the skin showed it to be pink and well perfused area the lungs were clear.  Palpation of the precordium showed it to be normal with a well-healed midline scar.  First heart sound was normal and second heart sound was widely split.  There was a grade 2/6 systolic ejection murmur fairly well localized to the left upper sternal border.  I no longer hear the diastolic rumble.  The liver edge was 2 cm below the right costal margin.  Pulses were 2+ bilaterally.  The wounds were healed.  There was no peripheral edema.     EKG showed sinus rhythm at 64 bpm, with first-degree block and a TX interval of 266 ms, right ventricular hypertrophy, and T-wave inversions in the inferior and precordial leads.  This EKG has not changed since the previous one, however the T-wave in lead V6 has become inverted in the last year.     I reviewed the cardiac catheterization, pressure tracings and transesophageal echocardiogram with Sean and her .  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  I that this was the primary cause of her symptoms of exercise intolerance, fatigue and peripheral edema.  The cause of this was perplexing.  It was possible that her AICD patch was contributing to diastolic dysfunction, and I felt she should also be worked up for pericardial and myocardial infiltrative diseases.  I continued to feel, however, that she would still benefit from a new pulmonary valve, since one of the valve leaflets was frozen, and the valve has been in for 13 years.  I believed that the pulmonary valve stenosis, insufficiency and tricuspid valve stenosis was a minor contributor to her right-sided dysfunction and symptoms.  For now, I felt she should be medically managed.  Sean actually looked quite well at that visit, and therefore  was continued on her current medical management.  I also felt that her persistent weight gain and obesity was contributing to exercise intolerance.  Sean was also complaining of palpitations which needed to be characterized.     Therefore, Sean was on the same doses of her Lasix, coenzyme every 10, enalapril, Synthroid, and atorvastatin.  We placed a 48 hour Holter monitor as surveillance for arrhythmias.  She was advised to slowly begin an exercise program and continue to lose weight with diet.  Laboratory work including an GWEN, rheumatoid factor, complement, sedimentation rate, high specificity CRP, CBC, reticulocyte count, BNP, and CMP.  These results subsequently showed: CBC was within normal limits; the BNP was normal with the exception of a bilirubin of 2.0 mg/dL and a bicarbonate of 22 mmol per liter; the BNP was elevated at 209 pg/mL; the high sensitivity CRP was elevated at 4.72 mg/L; the TSH was 0.037 might grow international units per milliliter; the sedimentation rate was elevated at 46 mm/h; the C3 complement was elevated at 185 mg/dL.  The free T4, C4 complement, rheumatoid factor, GWEN and reticulocytes were all normal.  These results indicated an inflammatory process.  Sean was then referred to a rheumatologist to see if an inflammatory disease was contributing to her constrictive pericarditis.  She did not obtain that consultation. Sean's  Holter monitor subsequently showed sinus rhythm at rates 59 210 bpm with a heart average heart rate of 64 bpm, frequent premature atrial beats, atrial bigeminy and occasional atrial couplets, with a total of 1766 premature atrial beats in 24 hours, and rare PVCs.  The increase in atrial ectopy was new.    Sean also was evaluated by Dr. Thad Ny at [facility] adult congenital heart disease program in [place].  Dr. 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  He did not think a Serina valve placement was indicated at that time.  He felt that her tricuspid stenosis may have been secondary to her initial surgical repair.  He felt that her obesity was contributing to her dyspnea, and suggested a possible sleep study.  He felt that she probably would be able to tolerate a pregnancy in the future, but would be in a high risk category.  He recommended that her diuresis be increased and that her Lasix be increased to 40 mg by mouth daily.    Since her last clinic visit 6 weeks ago, Sean states that she is feeling better.  She has begun an exercise program with a , and does primarily weight lifting but also walks on a treadmill for about 5 minutes.  She reports less shortness of breath with exercise.  Her energy level has improved since her last clinic visit.  Her previous complaints of palpitations have resolved.  She is now active working 7 days at a time.  She is dieting and has lost about 12 pounds.    Sean is currently taking Lasix which has been increased to 40 mg by mouth daily and to which she has a good diuretic response, coenzyme every 10 100 mg by mouth twice a day, metoprolol tartrate 50 mg by mouth twice a day, enalapril 5 mg in the morning and 2.5 mg at night, Synthroid which has been decreased to 137 µg daily, Singulair 10 mg by mouth daily, levo cetirizine 5 mg by mouth daily, fish oil and multivitamins.    Physical exam revealed a pleasant, healthy-appearing obese young woman in no acute distress.  Vital signs showed a height of 175.3 cm or 5 feet 9 inches, and a weight of 116.1 kg or 255 lbs. 15 oz., which is a 5 kg weight decrease since her last clinic visit.  Blood pressure in the right arm was 107/71 mmHg, pulse 84 bpm and pulsed oximetry in room air 95%.    Examination of the skin showed it to be pink and well perfused.  The lungs are clear.   Palpation of the precordium showed it to be normal with a well-healed midline scar.  First heart sound was normal and second heart sound widely split.  The was a grade 2/6 systolic ejection murmur best heard at the left upper sternal border but also over the precordium.  Diastole was clear.  The liver edge was 2 cm below the right costal margin.  Pulses were 2+ bilaterally.  There was no peripheral edema.    EKG was obtained which showed an atrially paced rhythm at 70 bpm with an AV conduction time of 360 ms, right ventricular hypertrophy, and T-wave inversions in leads I, II, V5 and V6 and T-wave flattening in leads aVF indicating ischemia, with a QTc interval of 441.  Compared to the previous study there is a negative T-wave in lead I.    An echocardiogram was obtained which showed status post repair of tetralogy of flow.  Two-dimensional imaging was poor.  M-mode parameters were as follows: The aortic root measured 30 mm unchanged, left atrium was significantly dilated at 46 mm increased, right ventricle 20 mm by M-mode and 35 mm in the long axis view, the interventricular septum measured 10 and the left ventricular posterior wall 10 mm which were both normal.  The left ventricular internal dimension in diastole was 54 and in systole 41 mm giving a calculated ejection fraction of 47%.  The left ventricular ejection fraction has decreased since the previous study processes 56%).  There was no pericardial effusion.  There was decreased excursion of the septal leaflet of the tricuspid valve leading to inadequate tricuspid valve opening in diastole.  The pulmonary valve was echo dense and the posterior leaflet had decreased motion although the valve was poorly seen in general.  The ventriculoseptal defect patch was in proper position.  The right atrium was dilated and measured 47 x 46 mm.  There was subjectively normal right ventricular function with a fractional area change of 47%.  There was poor subcostal imaging.   The aortic arch was left-sided and unobstructed.  The right superior vena cava drained normally to the right atrium.  Doppler analysis using pulsed, continuous wave and color-flow: Tricuspid stenosis was present with a 12 peak and 5 mean millimeter gradient across it indicating mild + tricuspid stenosis which was unchanged.  The gradient across the pulmonary valve was 25 mmHg peak indicating mild obstruction and there was mild to moderate pulmonary insufficiency.  No tricuspid insufficiency.  Trivial mitral insufficiency.  No aortic stenosis, aortic insufficiency, or residual ventriculoseptal defect.  Impression: Status post repair of tetralogy of flow with bioprosthetic valve in the pulmonary position.  Echodense pulmonary valve with decreased motion of the posterior leaflet, but only a 25 mmHg peak gradient across it indicating mild obstruction in the presence of normal right ventricular function, with mild to moderate pulmonary insufficiency.  Moderate left ventricular dysfunction which has increased.  Normal right ventricular function.  Dilated left atrium which has increased.  Dilated right atrium.  Tricuspid stenosis with decreased excursion of the septal leaflet.    An exercise stress test was performed which showed that [place] exercise 6 minutes and 49 seconds of the Sam protocol, indicating poor exercise tolerance for age.  Baseline heart rate was 63 bpm, increased to 137 bpm at peak exercise before decreasing back to 71 bpm 9 minutes into recovery.  This was a suboptimal peak heart rate which was probably secondary to beta-blockade.  Baseline blood pressure was 98/63 mmHg in the right arm, increased to 117/65 mmHg 1 minute into recovery, before decreasing back to 103/51 mmHg 7 minutes into recovery.  This was a blunted pressure response to exercise and may have been due to beta-blockade and Ace inhibition.  Baseline saturation was 97% and decreased to 87% at peak exercise, before increasing back to 98%  1 minute into recovery.  There were desaturations at peak exercise that probably represented intrapulmonary shunting.  Sinus rhythm with frequent PACs and occasional atrial couplets were present starting in stage II, which then resolved at peak exercise.  Occasional PACs were then seen again in recovery there were no ST segment depressions throughout exercise, in fact, the negative T-wave in leads 2 and V5 improved during exercise.  It should be noted that the patient was examined after exercise and the lungs were clear.  Impression: Suboptimal level of exercise for age, with blunted heart rate response and blood pressure response.  Moderate desaturations with exercise to 87% which then resolved.  Premature atrial complexes with atrial couplets increase in stage II and then are suppressed.  Improvement in T-wave inversions throughout exercise.    Sean was also evaluated by Dr. Chintan Cabrera who did not detect any arrhythmias on her newly placed generator download, and did not make any recommendations in pacemaker or rhythm management.  He asked to see her again in 6 months.    It is my impression that Sean has improved since her last clinic visit.  She has improved symptoms of exercise intolerance, improved energy level, improved activity, and her palpitations have resolved.  She is now exercising regularly and losing weight.  She still has poor exercise tolerance, however, and desaturates with exercise, which I suspect is intrapulmonary shunting.  I do not detect pulmonary edema on exam during exercise.  She has had a progression in left ventricular dysfunction which I find no etiology for.  She continues to have mild pulmonary valve stenosis with mild to moderate pulmonary insufficiency of her bioprosthetic valve.  Her right ventricular function has improved.  She also has mild tricuspid valve stenosis, and moderate right atrial and left atrial dilatation which has increased.  Her recent Holter monitor has  shown no ventricular ectopy, rather PACs, atrial couplets and triplets.  I feel that Sean is cardiac condition has improved with increasing diuresis and increasing exercise capacity and also weight loss.  I continued to be concerned by her diagnosis of constrictive pericarditis, her left ventricular dysfunction, and her intrapulmonary shunting.  I also feel that weight lifting is not the best exercise for her, and have asked her to switch more to cardio training and less weight lifting.    Sean should increase her coenzyme Q10 to 200 mg by mouth twice a day, and stay on the same doses of her other medications.  Further disposition for the cardiac status of Sean Baez be determined following her response to increased coenzyme Q10, laboratory work and continued exercise training and.weight loss.  She should return to this clinic in 3 months time with an EKG and an echocardiogram.  SBE prophylaxis continues to be in order for all dental and surgical procedures.       Thank you very much for allowing up to participate the care of your patient.       Sincerely      Chantel Saleh

## 2018-09-17 ENCOUNTER — CLINICAL SUPPORT (OUTPATIENT)
Dept: PEDIATRIC CARDIOLOGY | Facility: CLINIC | Age: 33
End: 2018-09-17
Attending: PEDIATRICS
Payer: COMMERCIAL

## 2018-09-17 DIAGNOSIS — I49.5 SINUS NODE DYSFUNCTION: ICD-10-CM

## 2018-09-17 DIAGNOSIS — Q24.9 ADULT CONGENITAL HEART DISEASE: ICD-10-CM

## 2018-09-17 DIAGNOSIS — I47.20 VT (VENTRICULAR TACHYCARDIA): ICD-10-CM

## 2018-09-17 DIAGNOSIS — I47.20 VT (VENTRICULAR TACHYCARDIA): Primary | ICD-10-CM

## 2018-09-17 PROCEDURE — 93296 REM INTERROG EVL PM/IDS: CPT | Mod: S$GLB,,, | Performed by: PEDIATRICS

## 2018-09-17 PROCEDURE — 93295 DEV INTERROG REMOTE 1/2/MLT: CPT | Mod: S$GLB,,, | Performed by: PEDIATRICS

## 2018-10-01 ENCOUNTER — TELEPHONE (OUTPATIENT)
Dept: CARDIOLOGY | Facility: CLINIC | Age: 33
End: 2018-10-01

## 2018-10-04 ENCOUNTER — CLINICAL SUPPORT (OUTPATIENT)
Dept: CARDIOLOGY | Facility: CLINIC | Age: 33
End: 2018-10-04
Payer: COMMERCIAL

## 2018-10-04 ENCOUNTER — OFFICE VISIT (OUTPATIENT)
Dept: CARDIOLOGY | Facility: CLINIC | Age: 33
End: 2018-10-04
Payer: COMMERCIAL

## 2018-10-04 ENCOUNTER — LAB VISIT (OUTPATIENT)
Dept: LAB | Facility: OTHER | Age: 33
End: 2018-10-04
Payer: COMMERCIAL

## 2018-10-04 VITALS
SYSTOLIC BLOOD PRESSURE: 107 MMHG | OXYGEN SATURATION: 97 % | DIASTOLIC BLOOD PRESSURE: 68 MMHG | BODY MASS INDEX: 35.81 KG/M2 | WEIGHT: 241.75 LBS | HEART RATE: 86 BPM | HEIGHT: 69 IN

## 2018-10-04 DIAGNOSIS — Q21.3 TOF (TETRALOGY OF FALLOT): ICD-10-CM

## 2018-10-04 DIAGNOSIS — Z87.74 TETRALOGY OF FALLOT S/P REPAIR: ICD-10-CM

## 2018-10-04 DIAGNOSIS — I47.20 VT (VENTRICULAR TACHYCARDIA): ICD-10-CM

## 2018-10-04 DIAGNOSIS — Z87.74 TETRALOGY OF FALLOT S/P REPAIR: Primary | ICD-10-CM

## 2018-10-04 DIAGNOSIS — I42.5 CONSTRICTIVE CARDIOMYOPATHY: ICD-10-CM

## 2018-10-04 DIAGNOSIS — E03.8 OTHER SPECIFIED HYPOTHYROIDISM: ICD-10-CM

## 2018-10-04 LAB
ALBUMIN SERPL BCP-MCNC: 3.6 G/DL
ALP SERPL-CCNC: 83 U/L
ALT SERPL W/O P-5'-P-CCNC: 18 U/L
ANION GAP SERPL CALC-SCNC: 12 MMOL/L
AST SERPL-CCNC: 14 U/L
BILIRUB SERPL-MCNC: 1.8 MG/DL
BNP SERPL-MCNC: 178 PG/ML
BUN SERPL-MCNC: 10 MG/DL
C3 SERPL-MCNC: 145 MG/DL
CALCIUM SERPL-MCNC: 9.7 MG/DL
CHLORIDE SERPL-SCNC: 106 MMOL/L
CHOLEST SERPL-MCNC: 119 MG/DL
CHOLEST/HDLC SERPL: 3.5 {RATIO}
CO2 SERPL-SCNC: 23 MMOL/L
CREAT SERPL-MCNC: 0.8 MG/DL
CRP SERPL-MCNC: 2.75 MG/L
ERYTHROCYTE [DISTWIDTH] IN BLOOD BY AUTOMATED COUNT: 14.3 %
EST. GFR  (AFRICAN AMERICAN): >60 ML/MIN/1.73 M^2
EST. GFR  (NON AFRICAN AMERICAN): >60 ML/MIN/1.73 M^2
GLUCOSE SERPL-MCNC: 92 MG/DL
HCT VFR BLD AUTO: 38.5 %
HDLC SERPL-MCNC: 34 MG/DL
HDLC SERPL: 28.6 %
HGB BLD-MCNC: 12.4 G/DL
LDLC SERPL CALC-MCNC: 73.2 MG/DL
MCH RBC QN AUTO: 27.4 PG
MCHC RBC AUTO-ENTMCNC: 32.2 G/DL
MCV RBC AUTO: 85 FL
NONHDLC SERPL-MCNC: 85 MG/DL
PLATELET # BLD AUTO: 345 K/UL
PMV BLD AUTO: 9.9 FL
POTASSIUM SERPL-SCNC: 3.7 MMOL/L
PROT SERPL-MCNC: 7.3 G/DL
RBC # BLD AUTO: 4.52 M/UL
SODIUM SERPL-SCNC: 141 MMOL/L
T3FREE SERPL-MCNC: 2.3 PG/ML
T4 SERPL-MCNC: 9.6 UG/DL
TRIGL SERPL-MCNC: 59 MG/DL
TSH SERPL DL<=0.005 MIU/L-ACNC: 0.41 UIU/ML
WBC # BLD AUTO: 8.68 K/UL

## 2018-10-04 PROCEDURE — 93325 DOPPLER ECHO COLOR FLOW MAPG: CPT | Mod: S$GLB,,, | Performed by: PEDIATRICS

## 2018-10-04 PROCEDURE — 80053 COMPREHEN METABOLIC PANEL: CPT

## 2018-10-04 PROCEDURE — 93303 ECHO TRANSTHORACIC: CPT | Mod: S$GLB,,, | Performed by: PEDIATRICS

## 2018-10-04 PROCEDURE — 93000 ELECTROCARDIOGRAM COMPLETE: CPT | Mod: 59,S$GLB,, | Performed by: PEDIATRICS

## 2018-10-04 PROCEDURE — 83880 ASSAY OF NATRIURETIC PEPTIDE: CPT

## 2018-10-04 PROCEDURE — 84443 ASSAY THYROID STIM HORMONE: CPT

## 2018-10-04 PROCEDURE — 80061 LIPID PANEL: CPT

## 2018-10-04 PROCEDURE — 85027 COMPLETE CBC AUTOMATED: CPT

## 2018-10-04 PROCEDURE — 93320 DOPPLER ECHO COMPLETE: CPT | Mod: S$GLB,,, | Performed by: PEDIATRICS

## 2018-10-04 PROCEDURE — 93015 CV STRESS TEST SUPVJ I&R: CPT | Mod: S$GLB,,, | Performed by: PEDIATRICS

## 2018-10-04 PROCEDURE — 3008F BODY MASS INDEX DOCD: CPT | Mod: CPTII,S$GLB,, | Performed by: PEDIATRICS

## 2018-10-04 PROCEDURE — 86141 C-REACTIVE PROTEIN HS: CPT

## 2018-10-04 PROCEDURE — 36415 COLL VENOUS BLD VENIPUNCTURE: CPT

## 2018-10-04 PROCEDURE — 84481 FREE ASSAY (FT-3): CPT

## 2018-10-04 PROCEDURE — 84436 ASSAY OF TOTAL THYROXINE: CPT

## 2018-10-04 PROCEDURE — 86160 COMPLEMENT ANTIGEN: CPT

## 2018-10-04 NOTE — PROGRESS NOTES
Sean Baez was seen in the [facility] at [facility] on  October 4th, 2018.  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  I will summarize her rather complex course.       Sean underwent repair of tetralogy of Fallot in early childhood, and then a pulmonary valve replacement later in childhood.  In May 2005, she had a second pulmonary valve replacement with a 29 mm Mosiac porcine valve in the pulmonary position because of pulmonary insufficiency, and placement of an epicardial pacemaker for sinus node dysfunction, and placement of an automatic intracardiac defibrillator with an epicardial patch for ventricular tachycardia.  The AICD generator was changed in 2011.  We followed her for many years in this clinic with a diagnosis of right ventricular dysfunction, a well-functioning porcine pulmonary valve, ventricular tachycardia and sinus node dysfunction, pacemaker and AICD, tricuspid stenosis, mitral insufficiency, mild left ventricular dysfunction and obesity. Her rhythm was well controlled over the last several years, with only one inappropriate shock about 8 years ago requiring a pacemaker adjustment.       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  During that time she had reasonable exercise tolerance, pulmonary valve function and no arrhythmias.      At a clinic visit in April, 2017, she was feeling well.  Physical exam showed a grade 2 to 3/6 systolic ejection murmur best heard at the left mid to upper sternal border and over the precordium, and a newly heard diastolic rumble at the left lower sternal border. The liver edge was 2 cm below  the right costal margin.  Pulses were 2+ in brachial and 1+ in femoral, although it was difficult to feel her pulse is secondary to obesity.  There was no peripheral edema.  An EKG showed sinus rhythm at 68 bpm, normal atrial and ventricular forces, and T-wave inversions in lead 1 V5 and V6 suggestive of ischemia (unchanged).  An echocardiogram showed an aortic root of 30 mm, the left atrium was dilated at 44 mm, (no old values for comparison), the right ventricle was 34 mm which was slightly increased from 32 mm, and the fractional area change of 30%, showed mildly reduced function which had improved.  The interventricular septum measured 11 on the left ventricular posterior wall 12 mm which are at the upper limits of normal, the left ventricular internal dimension in diastole was 52 and in systole 36 mm giving him measured ejection fraction of 57%.  The ejection fraction was slightly decreased but improved since the previous value.  The pulmonary valve annulus measured 19 mm. There was slight paradoxic motion of the interventricular septum, with otherwise normal-appearing right and left ventricular function. The tricuspid valve domed in diastole.  The pulmonary valve leaflets could be seen and the pulmonary annulus of 19 mm was probably underestimated.  The right atrium was dilated at 61 x 57 mm.  The left pulmonary artery measured 12 mm and the right pulmonary artery could not be seen.  The aortic arch was left-sided and unobstructed.  The gradient across the tricuspid valve was 9 peak and 4 mean mmHg gradient indicating mild stenosis, unchanged.  There was trivial tricuspid regurgitation with a regurgitant gradient of 24 mmHg suggesting normal right ventricular pressure.  The gradient across the porcine pulmonary valve was 22 peak and 13 mean mmHg showing trivial obstruction, and there was trivial pulmonary insufficiency.  There was mild mitral insufficiency and no residual ventricular septal defect.   An exercise stress test showed it Steven exercised 7 minutes and 30 seconds of the Sam protocol, and then stopped due to fatigue and leg cramps.  Baseline blood pressure was 108/77 mmHg and then increased to 159/68 mmHg, which may be an underestimate, before returning to baseline.  Baseline heart rate was 68 bpm and increased to 147 bpm before returning to baseline.  There were initially T-wave inversions in lead I V5 and V6, which then reverted to upright during exercise, before becoming negative again in recovery.  There were no other ST segment changes or arrhythmias.      At that visit, I felt  that Steven was stable with her complex congenital heart disease.  Her right and left ventricular function had improved.  Her porcine pulmonary valve was functioning well with only mild stenosis and insufficiency.  She had mild tricuspid stenosis with significant right atrial dilatation, also unchanged.  She had left atrial dilatation which I felt was secondary to her mitral insufficiency and perhaps some diastolic dysfunction, also unchanged.  Per her report, her pacemaker was functioning properly and her rhythm was controlled on her current dose of metoprolol.  I requested records from her electrophysiologist in [place]. No cardiac intervention was recommended  A CBC, CMP, BNP, and lipid profile were drawn which showed a normal CBC, and normal CMP, a BNP of 82 pg/mL, an elevated total cholesterol of 220 mg/dL, a low HDL cholesterol of 36 mg/dL, normal triglycerides, and an elevated LDL cholesterol of 168 mg/dL.  A chest x-ray was obtained at that visit which showed mild cardiomegaly with normal vascularity, epicardial pacing leads on the right atrium and right ventricle and the AICD patch at the apex.  Sean reported that she had become engaged and desired pregnancy in the future. At that visit I felt she would be able to tolerate a pregnancy.       At a clinic in November, 2017, Sean was not feeling well.   Approximately 3 months prior, while walking on the beach, she had the sudden onset of fatigue, shortness of breath, palpitations and sweating, but no chest pain.  Although she sat down and went into air-conditioning, the fatigue, shortness of breath and sweating persisted for another 30 minutes. Following that, she has noticed an increase in fatigue and exercise intolerance.  She has not participated in any exercise for that reason.  She reported these were the same symptoms as before her last valve replacement.  Sean also had a very busy life and lots of stresses.  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  She reported increased fatigue with these activities and decreased energy.  She had not had a pacemaker check in 9 months.  She was recently started on atorvastatin for elevated cholesterol, which was her only medication change.  She was taking metoprolol succinate 50 mg by mouth daily, enalapril 5 mg in the morning and 2.5 mg at night, aspirin 81 mg by mouth daily, atorvastatin 20 mg by mouth daily, Zoloft 100 mg by mouth daily, and venlafaxine 150 mg by mouth daily.  Her exam showed she had gained weight to 117 kg, a 2 kg weight increase.  Her blood pressure in the right arm was 119/76 mmHg.  Pulse was 91 bpm and pulsed oximetry in room air was 98%.  First heart sound was normal and second heart sound was widely split. There was a grade 2/6 systolic ejection murmur best heard at the left upper sternal border and down the left sternal border.  A diastolic rumble was heard.  The liver was 2 cm below the right costal margin and unchanged.  The pacemaker was palpated in the upper mid abdomen.  Pulses were 2+ bilaterally except for 1+ in the right femoral.  There was no peripheral edema.     EKG showed  sinus rhythm at a rate of 73 bpm with first-degree block (386 ms).  T-wave inversions in leads II and aVF, normal ventricular forces, and T wave inversions in leads V1 and V5, with T-wave flattening in V6.  Compared to the previous EKG, it was no change.     Echocardiogram showed the anatomy for tetralogy of Fallot status post complete repair and status post a pulmonary valve replacement.  It should be mentioned that the heart was difficult to visualize and images were of poor quality and attributed to obesity.  The aortic root measured 30 mm, unchanged, left atrium 35 mm, improved, and right ventricle 34 mm, unchanged.  The interventricular septum measured 11 and the left ventricular posterior wall 11 mm both of which were at the upper limits of normal.  The left ventricular internal dimension in diastole was 56 and in systole 43 mm giving a calculated ejection fraction of 46% which has decreased since the previous study.  There was flattening of septal wall motion.  The left ventricular posterior wall bowed posteriorly.  The pulmonary valve leaflets were difficult to see.  The tricuspid valve leaflets were difficult to see, but the tricuspid valve annulus was subjectively smaller than the mitral valve annulus.   Right ventricular function was judged subjectively to be mildly depressed, but the fractional area change was measured at 30%, which is within normal limits.   The branch pulmonary arteries could not be imaged.  The aortic arch was left-sided and unobstructed.  The gradient across the tricuspid valve was 10 peak and 5 mean mmHg indicating mild tricuspid stenosis, unchanged.  The gradient across the bioprosthetic pulmonary valve was 23 peak mmHg, unchanged.  Trivial pulmonary insufficiency.  No tricuspid insufficiency.  No aortic stenosis or aortic insufficiency.  Trivial mitral insufficiency, improved.  No aortic arch obstruction.       The pacemaker was then downloaded by the [employer] representative, and  it showed that she is atrially paced 6% of the time with no ventricular pacing.  She had a prolonged AV interval of 370 ms.  Her underlying rhythm was sinus bradycardia with a ventricular rate in the 40s.  She had no abnormal tachycardias greater than 150 bpm.  Her atrial and ventricular thresholds were good.  She had less than one year life on her battery.  We adjusted her pacemaker settings to have rates detected (monitor zone) greater than 130 bpm for 32 beats in duration.     At that visit,  my impression was Sean had a decrease in exercise tolerance and an increase in fatigue which was cardiac related.  She was difficult to evaluate by transthoracic echocardiography, probably secondary to obesity. I felt  her episode was secondary to an increase in tricuspid stenosis or bioprosthetic pulmonary valve dysfunction, and a decrease in left ventricular function.  Her symptoms may have been exacerbated by the stresses of finishing her residency, moving across the country, starting a new job, and planning a wedding.       Therefore, I began Sean on coenzyme Q 10 100 mg by mouth twice a day, for her left ventricular dysfunction, right ventricular dysfunction, and because she was started on a statin drug.   I presented her in cath/surgery conference for cardiac catheterization, where her tricuspid valve, pulmonary valve and coronary arteries could be evaluated, and possibly a Serina valve could be implanted and or a tricuspid valve balloon.  At the same time, I wanted her to have a transesophageal echocardiogram since her transthoracic imaging was poor.   I kept her on the same doses of her other medications, enalapril, metoprolol, atorvastatin, and aspirin.    Laboratory work which was obtained at that visit which subsequently showed a normal CBC with the exception of a mildly elevated platelet count of 352,000, and a normal CMP.  Of note, her BNP was mildly elevated to  162 pg/mL, compared to the previous value.   A free T4 was normal at 0.9 ng/dL, but her free T3 was decreased at 1.8 pg/mL, although her TSH was normal at 1.4 to micro-units per milliliter.  A cholesterol profile was repeated now that she was on atorvastatin, and that showed the cholesterol had decreased to 179 mg/dL, and the LDL cholesterol had decreased to 123.4 mg/dL, which were now normal, although her HDL remained decreased at 37 mg/dL.  Sean then saw her internal medicine doctor who began her on thyroid replacement hormone.        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  A catheterization and possible Serina valve implantation has been scheduled for February 6, 2018.  Her wedding was March 10, 2018.  Her work schedule was rather demanding, with 7-9 days in-house as a hospitalist, followed by 7 days off.   Sean was feeling better than she was at her last clinic visit, and in particular had less fatigue.  She was still short of breath with climbing stairs, but has not had any further episodes of sudden onset of shortness of breath, weakness, sweating, palpitations or chest pain.  She had started a diet and light exercise program.    She was taking metoprolol XL 50 mg by mouth daily, enalapril 5 mg in the morning and 2.5 mg at night, aspirin 81 mg by mouth daily, coenzyme Q10 200 mg by mouth twice a day, atorvastatin 20 mg by mouth daily, Synthroid 150 µg by mouth daily, Zoloft 100 mg by mouth daily, and had weaned her Effexor down to 75 mg by mouth daily.     Exam revealed an obese but very pleasant and otherwise healthy appearing young woman in no acute distress.  Height was 175.3 cm and weight was 116.8 kg, a 1 kg weight decrease since her last clinic visit.   First heart sound was normal and second heart sound was widely split.  The was a grade 2/6 systolic ejection murmur  heard best at the left upper sternal border, and faintly over the precordium.  Diastole was clear (the previous diastolic rumble had resolved).  The liver edge was 2 cm below the right costal margin and unchanged.  Pulses were 2+ bilaterally.  There was no peripheral edema.     EKG showed an atrially paced rhythm at a rate of 68 bpm with a prolonged SC interval of 112 ms, with right ventricular conduction delay and negative T waves in the left precordial leads, unchanged.     Echocardiogram showed the anatomy for status post repair of tetralogy of Fallot and placement of a bioprosthetic valve in the pulmonary position.  The aortic root was mildly dilated at 31 mm, unchanged, the left atrium had increased to 45 mm, and the right ventricle measured 29 mm, unchanged.  The interventricular septum measured 12 and the left ventricular posterior wall 12 mm which were at the upper limits of normal.  The left ventricular internal dimension in diastole was 53 and in systole 37 mm giving a calculated ejection fraction of 58%, which had improved significantly from her previous ejection fraction of 46% 2 months ago.  The right ventricle was not seen well enough quantitate function, but subjectively appeared improved since the previous visit.  They bioprosthetic valve was poorly seen in the pulmonary position, and the leaflets were thick and moving poorly.  The right atrium was significantly dilated at 54 x 53 mm.  The tricuspid valve was not seen well enough to measure the annulus.  pulmonary arteries could not be imaged.  The aortic arch was left-sided  The gradient across the tricuspid valve was 11 peak and 5 mean millimeters of mercury indicating mild stenosis which was unchanged.  The gradient across the right ventricular outflow tract was 27 mmHg peak.  There was mild pulmonary insufficiency. There was trivial tricuspid insufficiency which was inadequate to estimate right ventricular pressures.       Sean was evaluated  by Dr. Chintan Cabrera, who felt that Sean did not have any normal tachycardia arrhythmias.  Her defibrillator, was approaching end-of-life.  Please see Dr. Cabrera's evaluation for more details.  He recommended that Sean receive monthly pacemaker/AICD evaluations to determine the best time for replacement.     My impression at that visit was Sean's left ventricular function had improved significantly since being placed on coenzyme every 10 and thyroid replacement hormone.  I believed this had led to a decrease in symptoms and an improvement in energy level.  I continued to feel, however, that Sean still required a transesophageal echocardiogram and a cardiac catheterization and possible Serina valve replacement.  I did feel, however, that this could be delayed until after her wedding, to avoid the unlikely chance of a complication that might impact her wedding.   My opinion was that waiting an additional month to perform the catheterization would not adversely affect her health.  I discussed this with Sean and her fiancé, and they were in agreement.     Sean expressed the desire to undergo pregnancy at some time in the future.  I recommended she have her cardiac catheterization and possible Serina valve implantation, and also her AICD generator change, before becoming pregnant.  I continued the same doses of her medications.   SBE prophylaxis was recommended for dental and surgical procedures.       Sean was seen again in clinic in February, 2018.  She was generally feeling well except for one episode of tachycardia, shortness of breath and nausea that occurred out while working out with her  in the morning, and she had not yet eaten breakfast.  She had no more recurrences of these symptoms even though she has had several training sessions since then.  She otherwise felt well, and participated in all activities including exercising and working as a hospitalist, and denied chest pain,  palpitations and shortness of breath and swelling.  She had postponed her cardiac catheterization and possible Serina valve placement until after the wedding.  She continued taking metoprolol 50 mg by mouth daily, enalapril 5 mg in the morning and 2.5 mg in the evening, aspirin 81 mg by mouth daily, coenzyme Q 10 100 mg by mouth twice a day, Synthroid 150 µg by mouth daily, and Zoloft 100 mg by mouth daily.  She has been weaned off her Effexor.     Physical exam revealed an obese, pleasant and healthy-appearing woman in no acute distress.  Vital signs showed a height of 175.3 cm or 5 feet 9 inches and a weight of 119 kg or 262 lbs. 7 oz., which is a weight increase of 2.2 kg since her last clinic visit.  Blood pressure in the right arm was 107/73 mmHg, pulse 87 bpm, and pulsed oximetry in room air 98%. Examination of the skin showed it to be pink and well perfused.  The lungs were clear.  Palpation of the precordium showed it to be normal with a well-healed midline scar, and a pacemaker palpated in the abdomen.  The liver edge was 2 cm below the right costal margin, unchanged.  Pulses were 2+ bilaterally.  There was no peripheral edema.     EKG was obtained which showed a paced atrial rhythm at 78 bpm, with right ventricular conduction delay, and T-wave inversions in the limband precordial leads suggestive of ischemia, unchanged.     Echocardiogram showed evidence for repair of tetralogy of Fallot status post pulmonary valve replacement.  Two-dimensional imaging was poor.  M-mode parameters were as follows: The aortic root measured 30 mm, left atrium 43 mm, right ventricle 26 mm, interventricular septum 12 and left ventricular posterior wall 12 mm, the left ventricular internal dimension in diastole was 56 and in systole 36 mm giving a calculated ejection fraction of 55% and these numbers are normal.  The right ventricle function was judged subjectively to be reduced but unable to be quantitated The bioprosthetic  valve in the pulmonary position was difficult to see, however thickened leaflets with poor movement were observed.  The aortic arch was left-sided and unobstructed.  The superior vena cava drains normally to the right atrium.  Doppler analysis using pulsed continuous-wave and color-flow:  The there was turbulence across the tricuspid valve with a gradient of 14 peak and 10 mean millimeters of mercury indicating mild tricuspid valve stenosis which was unchanged.  Mitral insufficiency was present which was mild.  The gradient across the bioprosthetic pulmonary valve was 32 mmHg peak in the setting of reduced right ventricular function.  No residual ventriculoseptal defect.  No aortic stenosis or aortic insufficiency. Impression: Status post repair of tetralogy of Fallot.  Poor two-dimensional imaging.  Decreased right ventricular function.  Bioprosthetic pulmonary valve with thickened and poorly moving leaflets, and a 32 mmHg peak gradient across it in the setting of reduced right ventricular function.  Mild tricuspid valve stenosis with a 14 peak and 10 mean millimeter gradient across it, unchanged.     Sean was then evaluated by electrophysiologist Dr. Chintan Cabrera, performed a download of her pacemaker, and that showed that there was a 6 beat run of ventricular tachycardia which resolved spontaneously, and that the pacemaker generator close to end-of-life.     My impression from that visit was that Sean had right ventricular dysfunction and pulmonary valve dysfunction, in particular pulmonary valve stenosis.   I felt that she required a pulmonary valve replacement which hopefully could be performed with a Serina valve.  I believed her right ventricular function would improve after that.  She also has ventricular tachycardia, but it was self-limited and she was protected by both her metoprolol and her AICD.  The generator also required replacement.  Dr. Cabrera has recommended the metoprolol be increased to  50 mg by mouth twice a day.   Following this clinic visit, Sean called with some leg swelling, and was placed on Lasix 20 mg by mouth daily, in addition to her other medications.     Sean had an uneventful wedding and honeymoon.  She underwent replacement of her ICD generator by Dr. Chintan Cabrera on April 4, 2018.  An ICD EVERA MRI S  [id number]: Serial No. [id number] was placed in the abdominal pocket and the previous generator removed.  She tolerated the procedure well.  Lead testing revealed:      RV Lead:       Threshold: 1.3 V / 0.4 ms       Impedance: 646 ohms       R wave: 5.9 mV  RA Lead:       Threshold: 1.3 V / 0.4 ms       Impedance: 589 ohms       P wave: .5 mV     Sean underwent transesophageal echocardiogram and cardiac catheterization under general anesthesia on April 26, 2018.  Transesophageal echocardiogram showed:     Mild right ventricular dilatation.  Paradoxic motion of the interventricular septum.    Normal left ventricle structure and size, with normal left ventricular posterior wall motion.  Normal to mildly decreased right ventricular free wall motion.  No pericardial effusion with normal-appearing pericardium.  No atrial or ventricular shunts.  Tethering of the tricuspid valve septal leaflet, with mild doming mild tricuspid valve prolapse.  Normal tricuspid valve velocity, and mild tricuspid valve insufficiency, with a 29 mmHg gradient.    Immobile and echodense posterior leaflet of the prosthetic pulmonary valve, with a peak gradient of 17 mmHg and mild pulmonary insufficiency.     Catheterization showed:     Saturations: SVC 69%, RA 72%, right pulmonary artery 69%, left pulmonary artery 69%, aortic 99%.    Pressures in mmHg:  RA mean 19, RV 50/19, RPA  40/14 mean  25, LPA 43/16 mean 26, RPCWP mean 20, LPCWP mean 20, LV 101/18, ascending aorta 101/59  mean  77, and descending aorta 99/58 mean 75.    Calculations:  Using a hemoglobin of 11.3,  the Qp equaled to Qs at 2.55 L/min/m2,  "which gave a pulmonary vascular resistance of 2.36 Wood units/m2.  The raw cardiac output was 5.73 liters per minute.     Coronary angiography was then performed which showed normal left and right coronary arteries.  No other angiography was performed.  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     Sean's postprocedure follow-up clinic visit was on May 1, 2018.  Since her discharge from the hospital several days prior, she had been feeling generally well.  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  She had in general noticed increasing shortness of breath with walking compared to 6 months ago.  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  Her swelling had improved after starting the Lasix, she only noticed ankle swelling when on her feet for several hours.  She also noticed an improvement in palpitations after increasing her metoprolol to 50 mg by mouth twice a day, however she continued to experience palpitations every night when lying down, which lasted for a few seconds.  She had occasional "tingling" in her left upper chest which lasted for several seconds and occurred once a week, but no chest pain.  She had not exercised in over one month.  She has continued to gain weight.     Sean was taking Lasix 20 mg by mouth daily added about 2 months prior, coenzyme every 10 100 mg by mouth twice a day, enalapril 5 mg in the morning and 2.5 mg at night, metoprolol tartrate 50 mg by mouth twice a day, Synthroid, and atorvastatin.  She was no longer taking aspirin or Zoloft.     Physical exam revealed a pleasant, obese and healthy-appearing young woman in no acute distress.  Vital signs showed a height of 175.3 cm or 5 feet 9 inches, and a weight of 121.3 kg or 267 lbs. 6 oz., which is an increase of 2 kg from her last " clinic visit.  Blood pressure was 105/64 mmHg and heart rate 65 bpm.  Pulsed oximetry was not obtained     Examination of the skin showed it to be pink and well perfused area the lungs were clear.  Palpation of the precordium showed it to be normal with a well-healed midline scar.  First heart sound was normal and second heart sound was widely split.  There was a grade 2/6 systolic ejection murmur fairly well localized to the left upper sternal border.  I no longer head the diastolic rumble.  The liver edge was 2 cm below the right costal margin.  Pulses were 2+ bilaterally.  The wounds were healed.  There was no peripheral edema.     EKG showed sinus rhythm at 64 bpm, with first-degree block and a CT interval of 266 ms, right ventricular hypertrophy, and T-wave inversions in the inferior and precordial leads. This EKG had not changed since the previous one, however the T-wave in lead V6 has become inverted in the last year.     I reviewed the cardiac catheterization, pressure tracings and transesophageal echocardiogram with Sean and her .  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  I felt that this was the primary cause of her symptoms of exercise intolerance, fatigue and peripheral edema.  The cause of this was perplexing.  It was possible that her AICD patch was contributing to diastolic dysfunction, and I felt she should also be worked up for pericardial and myocardial infiltrative diseases.  I continued to feel, however, that she would still benefit from a new pulmonary valve, since one of the valve leaflets was frozen, and the valve had been in for 13 years.  I believed that the pulmonary valve stenosis, insufficiency and tricuspid valve stenosis was a minor contributor to her right-sided dysfunction and symptoms.  For now, I felt she should be medically managed.  Sean actually looked quite  well at that visit, and therefore was continued on her current medical management.  I also felt that her persistent weight gain and obesity were contributing to exercise intolerance.  Sean was also complaining of palpitations which needed to be characterized.     Therefore, Sean was on the same doses of her Lasix, coenzyme every 10, enalapril, Synthroid, and atorvastatin.  We placed a 48 hour Holter monitor as surveillance for arrhythmias.  She was advised to slowly begin an exercise program and continue to lose weight with diet.  Laboratory work including an GWEN, rheumatoid factor, complement, sedimentation rate, high specificity CRP, CBC, reticulocyte count, BNP, and CMP.  These results subsequently showed: CBC was within normal limits; the CMP was normal with the exception of a bilirubin of 2.0 mg/dL and a bicarbonate of 22 mmol per liter; the BNP was elevated at 209 pg/mL; the high sensitivity CRP was elevated at 4.72 mg/L; the TSH was 0.037 might grow international units per milliliter; the sedimentation rate was elevated at 46 mm/h; the C3 complement was elevated at 185 mg/dL.  The free T4, C4 complement, rheumatoid factor, GWEN and reticulocytes were all normal.  These results indicated an inflammatory process.  Sean was then referred to a rheumatologist to see if an inflammatory disease was contributing to her constrictive pericarditis.  She did not obtain that consultation. Sean's  Holter monitor subsequently showed sinus rhythm at rates 59 210 bpm with a heart average heart rate of 64 bpm, frequent premature atrial beats, atrial bigeminy and occasional atrial couplets, with a total of 1766 premature atrial beats in 24 hours, and rare PVCs.  The increase in atrial ectopy was new.     Sean also was evaluated by Dr. Thad Ny at [facility] in [place].  Dr. Ny agreed that Sean had developed a constrictive pericarditis, and he felt it might be  secondary to the AICD patch over the cardiac apex, and possibly intrinsic restrictive physiology from her tetralogy of Fallot.  He did not think a Serina valve placement was indicated at that time.  He felt that her tricuspid stenosis may have been secondary to her initial surgical repair.  He felt that her obesity was contributing to her dyspnea, and suggested a possible sleep study.  He felt that she probably would be able to tolerate a pregnancy in the future, but would be in a high risk category.  He recommended that her diuresis be increased and that her Lasix be increased to 40 mg by mouth daily.     Negar orozco was then seen in May, 2018.  Since her last clinic visit 6 weeks prior, Sean was feeling better.  She had begun an exercise program with a , did primarily weight lifting but also walked on a treadmill for about 5 minutes.  She reported less shortness of breath with exercise.  Her energy level had improved since her last clinic visit.  Her previous complaints of palpitations had resolved.  She was now active working 7 days at a time.  She was dieting and has lost about 12 pounds.     Sean was currently taking Lasix which had been increased to 40 mg by mouth daily and to which she has a good diuretic response, coenzyme every 10 100 mg by mouth twice a day, metoprolol tartrate 50 mg by mouth twice a day, enalapril 5 mg in the morning and 2.5 mg at night, Synthroid which had been decreased to 137 µg daily, Singulair 10 mg by mouth daily, levo cetirizine 5 mg by mouth daily, fish oil and multivitamins.     Physical exam revealed a pleasant, healthy-appearing obese young woman in no acute distress.  Vital signs showed a height of 175.3 cm or 5 feet 9 inches, and a weight of 116.1 kg or 255 lbs. 15 oz., which was a 5 kg weight decrease since her last clinic visit.  Blood pressure in the right arm was 107/71 mmHg, pulse 84 bpm and pulsed oximetry in room air 95%.     Examination of the skin  showed it to be pink and well perfused.  The lungs are clear.  Palpation of the precordium showed it to be normal with a well-healed midline scar.  First heart sound was normal and second heart sound widely split.  The was a grade 2/6 systolic ejection murmur best heard at the left upper sternal border but also over the precordium. Diastole was clear.  The liver edge was 2 cm below the right costal margin.  Pulses were 2+ bilaterally.  There was no peripheral edema.     EKG was obtained which showed an atrially paced rhythm at 70 bpm with an AV conduction time of 360 ms, right ventricular hypertrophy, and T-wave inversions in leads I, II, V5 and V6 and T-wave flattening in leads aVF indicating ischemia, with a QTc interval of 441.  Compared to the previous study there was a negative T-wave in lead I.     An echocardiogram was obtained which showed status post repair of tetralogy of Fallot.  Two-dimensional imaging was poor.  M-mode parameters were as follows: The aortic root measured 30 mm unchanged, left atrium was significantly dilated at 46 mm increased, right ventricle 20 mm by M-mode and 35 mm in the long axis view, the interventricular septum measured 10 and the left ventricular posterior wall 10 mm which were both normal.  The left ventricular internal dimension in diastole was 54 and in systole 41 mm giving a calculated ejection fraction of 47%.  The left ventricular ejection fraction has decreased since the previous study (56%).  There was no pericardial effusion.  There was decreased excursion of the septal leaflet of the tricuspid valve leading to inadequate tricuspid valve opening in diastole.  The pulmonary valve was echo dense and the posterior leaflet had decreased motion although the valve was poorly seen in general.  The ventriculoseptal defect patch was in proper position.  The right atrium was dilated and measured 47 x 46 mm. There was subjectively normal right ventricular function with a  fractional area change of 47%.  There was poor subcostal imaging.  The aortic arch was left-sided and unobstructed. The right superior vena cava drained normally to the right atrium.  Doppler analysis using pulsed, continuous wave and color-flow: Tricuspid stenosis was present with a 12 peak and 5 mean millimeter gradient across it indicating mild + tricuspid stenosis which was unchanged.  The gradient across the pulmonary valve was 25 mmHg peak indicating mild obstruction and there was mild to moderate pulmonary insufficiency.  No tricuspid insufficiency.  Trivial mitral insufficiency.  No aortic stenosis, aortic insufficiency, or residual ventriculoseptal defect.  Impression: Status post repair of tetralogy of Fallot with bioprosthetic valve in the pulmonary position.  Echodense pulmonary valve with decreased motion of the posterior leaflet, but only a 25 mmHg peak gradient across it indicating mild obstruction in the presence of normal right ventricular function, with mild to moderate pulmonary insufficiency.  Moderate left ventricular dysfunction which has increased.  Normal right ventricular function.  Dilated left atrium which has increased.  Dilated right atrium.  Tricuspid stenosis with decreased excursion of the septal leaflet.     An exercise stress test was performed which showed that Calvin exercise 6 minutes and 49 seconds of the Sam protocol, indicating poor exercise tolerance for age.  Baseline heart rate was 63 bpm, increased to 137 bpm at peak exercise before decreasing back to 71 bpm 9 minutes into recovery.  This was a suboptimal peak heart rate which was probably secondary to beta-blockade.  Baseline blood pressure was 98/63 mmHg in the right arm, increased to 117/65 mmHg 1 minute into recovery, before decreasing back to 103/51 mmHg 7 minutes into recovery.  This was a blunted pressure response to exercise and may have been due to beta-blockade and ACE inhibition.  Baseline saturation was 97%  and decreased to 87% at peak exercise, before increasing back to 98% 1 minute into recovery.  There were desaturations at peak exercise that probably represented intrapulmonary shunting.  Sinus rhythm with frequent PACs and occasional atrial couplets were present starting in stage II, which then resolved at peak exercise.  Occasional PACs were then seen again in recovery there were no ST segment depressions throughout exercise, in fact, the negative T-wave in leads 2 and V5 improved during exercise.  It should be noted that the patient was examined after exercise and the lungs were clear.  Impression: Suboptimal level of exercise for age, with blunted heart rate response and blood pressure response.  Moderate desaturations with exercise to 87% which then resolved.  Premature atrial complexes with atrial couplets increase in stage II and then are suppressed. Improvement in T-wave inversions throughout exercise.     Sean was also evaluated by Dr. Chintan Cabrera who did not detect any arrhythmias on her newly placed generator download, and did not make any recommendations in pacemaker or rhythm management.  He asked to see her again in 6 months.     My impression from that visit was Sean had improved since her last clinic visit.  She had improved symptoms of exercise intolerance, improved energy level, improved activity, and her palpitations had resolved.  She was now exercising regularly and losing weight.  She still had poor exercise tolerance, however, and desaturated with exercise, which I suspected was intrapulmonary shunting.  I did not detect pulmonary edema on exam during exercise.  She had a progression in left ventricular dysfunction for which I found no etiology.  She continued to have mild pulmonary valve stenosis with mild to moderate pulmonary insufficiency of her bioprosthetic valve.  Her right ventricular function had improved. She also had mild tricuspid valve stenosis, and moderate right atrial  and left atrial dilatation which had increased.  Her recent Holter monitor has showed no ventricular ectopy, rather PACs, atrial couplets and triplets.  I felt that Sean's cardiac condition had improved with increasing diuresis and increasing exercise capacity and also weight loss.  I continued to be concerned by her diagnosis of constrictive pericarditis, her left ventricular dysfunction, and her intrapulmonary shunting.  I also felt that weight lifting was not the best exercise for her, and asked her to switch more to cardio training and less weight lifting.     Sean was instructed to increase her coenzyme Q10 to 200 mg by mouth twice a day, and stay on the same doses of her other medications.  Laboratory studies were ordered including a CBC, CMP, BNP, complement C3, CRP and a sedimentation rate, but were not obtained.   She was given a return to this clinic in 3 months time with an EKG and an echocardiogram.  SBE prophylaxis continued to be in order for all dental and surgical procedures.     Sean  was seen on October 4, 2018.  Since her previous clinic visit 4 months prior, she continued to improve and felt well.  She has continue to diet and lost another 14 lb in 4 months, in addition to the 12 lb she had lost at her last clinic visit.  She is exercising regularly which includes Pilates, ballet bar, light weight training, and cardio roping machine, in addition to once a week with a .  She denied shortness of breath, chest pain, palpitations or swelling.  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  She was taking her medicine regularly.  She continues to have a waqar IUD, which she was planning on removing in March and then attempting pregnancy.  Her previous pacemaker download was October 3, 2018, the results of which were not yet available.    Sean was taking enalapril 5 mg in the morning and 2.5 mg in the evening, coenzyme Q10 200 mg p.o. b.i.d.,  metoprolol tartrate 50 mg p.o. b.i.d., Lipitor 20 mg p.o. q.day, Lasix 40 mg p.o. q.day to which she has a good diuretic response, levo cetirizine 5 mg p.o. q.day, Synthroid 137 mcg p.o. q.day, and Singulair 10 mg p.o. q.day.    Physical exam revealed a healthy-appearing and pleasant young woman in no acute distress.  Vital signs showed a height 175.3 cm or 5 ft 9 in, and weight of 109.6 kg or 241 lb 12 oz, which is a 14 lb weight decrease since her previous clinic visit in May, 2018.  Pulse was 86 beats per minute, pulse oximetry in room air 97%, and blood pressure in the right arm 107/68 mm of mercury.    Examination of the skin showed it to be pink and well perfused.  The lungs were clear.  Palpation of the precordium showed to be normal with a well-healed midline scar.  First heart sound was normal and 2nd heart sound was widely split.  There was a grade 2/6 systolic ejection murmur best heard at the left upper sternal border, but well over the precordium.  Diastole was clear.  The liver edge was at the right costal margin and improved.  Pulses were 2+ bilaterally. There was no peripheral edema.  The pacemaker was palpated in the abdomen.    EKG was obtained which showed a paced atrial rhythm at 68 beats per minute, right ventricular conduction delay, and T-wave inversions in leads I,II, AVF, V1 - V6, unchanged.    An echocardiogram was obtained which showed evidence for repair of tetralogy of Fallot, and a bioprosthetic valve in the pulmonary position.  M-mode parameters were as follows:  The aortic root measured 30 mm unchanged, left atrium 44 mm decreased slightly (46 mm), right ventricle 28 mm by m mode and 35 mm by  2D in long axis, unchanged.  The interventricular septum measured 10 and left ventricular posterior wall 11 mm, both normal.  The left ventricular internal dimension in diastole was 52 and systole 36 mm giving a calculated ejection fraction of 59%, he which has improved and is now normal  (previously 47%).  The pulmonary valve leaflets were thickened with decreased mobility, with the posterior leaflet being frozen, as noted previously.  The pulmonary valve annulus was small at 16 mm.  The right atrium was dilated measuring 56 x 47 mm which had increased.  There was decreased opening of the tricuspid valve with tethering of the septal leaflet.  Both right and left ventricular function were judged subjectively to be within normal limits.  The ventricular septal defect patch was in proper position.  Doppler analysis using pulse, continuous and color-flow:  No tricuspid insufficiency.  Peak gradient across the tricuspid valve was 11 peak and 6 mean mm of mercury, unchanged.  Peak gradient across the bioprosthetic pulmonary valve was 30 peak and 18 mean mm of mercury, slightly increased parentheses 25 mm of mercury peak).  Pulmonary insufficiency was present which was mild and improved.  No mitral insufficiency.  No aortic stenosis or aortic insufficiency.  The gradient across the mitral valve was 2 peak and 1 mean mmHg with E wave dominance suggestive of constrictive pericarditis.  Impression:  Tetralogy of Fallot status post placement of a bioprosthetic valve in the pulmonary position.  Normal right ventricular size and function.  Normal left ventricular size and function which has returned to normal. Hypoplastic pulmonary annulus measuring 16 mm, with thickened leaflets and frozen posterior leaflet, with a 30 mm Hg peak gradient suggesting mild obstruction which has increased slightly, and mild pulmonary insufficiency which has improved.  Dilated right atrium which has increased to 56 x 47 mm, and dilated left atrium which is stable to slightly improved at 44 mm.  Decreased excursion of the septal leaflet of the tricuspid valve leading to reduced flow and an 11 peak and 6 mean mmHg gradient, unchanged.  No gradient across mitral valve but E wave dominance, consistent with constrictive pericarditis.       An exercise stress test was obtained which showed that Sean exercised 7 min and 18 sec of the Sam protocol, 1 min and 18 sec into stage 3, which was below predicted for an adult, but improved from the previous study by about 30 sec.  Baseline heart rate was 63 beats per minute, increased to 159 beats per minute at peak exercise, before decreasing back to 75 beats per minute at the end of 7 min of recovery.  Baseline blood pressure was 115/64 mm of mercury, increased to 140/77 mm of mercury 1 min after peak exercise, before decreasing back to 108/65 mm of mercury 7 min into recovery.  Saturation was 98% at baseline, and then decreased to 94% at peak exercise, before returning to 99% 1 min into recovery.  Patient remained fully saturated throughout the remainder of the study.  Baseline rhythm was sinus at 63 beats per minute, and patient continued in sinus rhythm throughout exercise and recovery.  Occasional unifocal PVCs were seen starting in stage II and III, which resolved in recovery.  There were no PACs.  Baseline EKG had negative T-waves in leads 1, 2, AVF, and V1 through V6.  During exercise, T-waves became flattened in leads 1 and 2 and less negative in leads AVF, V1 through V6.  At peak exercise, T-waves were upright in leads 1, and 2, flattened in AVF, and upright in V1 through V6.  During recovery, T-waves again became negative in leads 1, 2, and AVF, V1 through V6.  Impression:  Low level of exercise predicted for age, however an improvement from the previous study of 30 sec.  Normal blood pressure and her heart rate response to exercise.  Mild desaturations to 94% at peak exercise, which recovers within 1 min to 99%.  Occasional unifocal PVCs during exercise which resolved in recovery, and no PACs, which is an improvement from the previous study.  Inverted T-waves in inferior and precordial leads which convert to positive during exercise, and then revert back to negative during recovery.    My  impression from this visit was that Sean had improvement in cardiac function, rhythm, energy, and exercise tolerance.  I attributed this to an improvement in left ventricular function, improvement in right ventricular function, a decrease in weight, an increase in exercise training, and an improvement in arrhythmias.  She still has evidence for a thickened and poorly functioning bioprosthetic valve, however the gradient across it was only 30 mm of mercury peak, with mild pulmonary insufficiency, and her right ventricular function had improved to normal. I still felt that she would need a Serina valve placement in the near future.  She still had evidence for constrictive pericarditis as evidence by her dilated right and left atria, and Doppler flow pattern in the left ventricle, however, I saw an overall improvement in cardiac function leading to decreased symptoms.    Sean's reasons for improvement were probably multifactorial, and I felt no reason to change any of her management.  Therefore, Sean  was instructed to continue on the same doses of her medications.  She was to continue her successful diet and exercise program.  I contacted Dr. Cabrera to ask for the results of her recent pacemaker download.  I suspected that she and her fetus would tolerate a pregnancy in the future.  Sean was instructed to return to this clinic in about 3 months time, at which point she would have a dual appointment with electrophysiologist Dr. Cabrera and myself, with an EKG and an echocardiogram.  Laboratory work was ordered including CBC, CMP, BNP, thyroid function studies and lipid profile.  SBE prophylaxis was in order for dental and surgical procedures.          Thank you very much for allowing up to participate the care of your patient.       Sincerely      Chantel Saleh

## 2018-12-17 ENCOUNTER — CLINICAL SUPPORT (OUTPATIENT)
Dept: PEDIATRIC CARDIOLOGY | Facility: CLINIC | Age: 33
End: 2018-12-17
Attending: PEDIATRICS
Payer: COMMERCIAL

## 2018-12-17 DIAGNOSIS — Q24.9 ADULT CONGENITAL HEART DISEASE: ICD-10-CM

## 2018-12-17 DIAGNOSIS — Q21.3 TOF (TETRALOGY OF FALLOT): ICD-10-CM

## 2018-12-17 DIAGNOSIS — I47.20 VT (VENTRICULAR TACHYCARDIA): ICD-10-CM

## 2018-12-17 PROCEDURE — 93296 REM INTERROG EVL PM/IDS: CPT | Mod: S$GLB,,, | Performed by: PEDIATRICS

## 2018-12-17 PROCEDURE — 93295 DEV INTERROG REMOTE 1/2/MLT: CPT | Mod: S$GLB,,, | Performed by: PEDIATRICS

## 2018-12-18 DIAGNOSIS — Q24.9 ADULT CONGENITAL HEART DISEASE: ICD-10-CM

## 2018-12-18 DIAGNOSIS — I47.20 VT (VENTRICULAR TACHYCARDIA): Primary | ICD-10-CM

## 2018-12-18 DIAGNOSIS — Q21.3 TOF (TETRALOGY OF FALLOT): ICD-10-CM

## 2019-01-31 LAB
AV DELAY - LONGEST: 180 MSEC
BATTERY VOLTAGE (V): 3.03 V
CHARGE TIME (SEC): 3.8 SEC
ERI (V): 2.73 V
HV IMPEDANCE (OHM): 56 OHM
IMPEDANCE RA LEAD (NATIVE): 665 OHMS
IMPEDANCE RA LEAD: 646 OHMS
OHS CV DC PP MS1: 0.4 MS
OHS CV DC PP MS2: 0.4 MS
OHS CV DC PP V1: 1.5 V
OHS CV DC PP V2: 1.5 V
P/R-WAVE RA LEAD (NATIVE): 7 MV
P/R-WAVE RA LEAD: 0.8 MV
PV DELAY - LONGEST: 150 MSEC
THRESHOLD MS RA LEAD (NATIVE): 0.4 MS
THRESHOLD MS RA LEAD: 0.4 MS
THRESHOLD V RA LEAD (NATIVE): 0.88 V
THRESHOLD V RA LEAD: 1 V

## 2019-02-10 DIAGNOSIS — Z87.74 TETRALOGY OF FALLOT S/P REPAIR: Primary | ICD-10-CM

## 2019-02-11 DIAGNOSIS — Z87.74 TETRALOGY OF FALLOT S/P REPAIR: Primary | ICD-10-CM

## 2019-02-12 NOTE — PROGRESS NOTES
Sean Baez was seen in the Adult with Congenital Heart Disease Clinic at [facility] on February 14th, 2019.  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  I will summarize her rather complex course.       Sean underwent repair of tetralogy of Fallot in early childhood, and then a pulmonary valve replacement later in childhood.  In May 2005, she had a second pulmonary valve replacement with a 29 mm Mosiac porcine valve in the pulmonary position because of pulmonary insufficiency, and placement of an epicardial pacemaker for sinus node dysfunction, and placement of an automatic intracardiac defibrillator with an epicardial patch for ventricular tachycardia.  The AICD generator was changed in 2011.  We followed her for many years in this clinic with a diagnosis of right ventricular dysfunction, a well-functioning porcine pulmonary valve, ventricular tachycardia and sinus node dysfunction, pacemaker and AICD, tricuspid stenosis, mitral insufficiency, mild left ventricular dysfunction and obesity. Her rhythm was well controlled over the last several years, with only one inappropriate shock about 9 years ago requiring a pacemaker adjustment.       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  During that time she had reasonable exercise tolerance, pulmonary valve function and no arrhythmias.      At a clinic visit in April, 2017, she was feeling well.  Physical exam showed a grade 2 to 3/6 systolic ejection murmur best  heard at the left mid to upper sternal border and over the precordium, and a newly heard diastolic rumble at the left lower sternal border. The liver edge was 2 cm below the right costal margin.  Pulses were 2+ in brachial and 1+ in femoral, although it was difficult to feel her pulse is secondary to obesity.  There was no peripheral edema. An EKG showed sinus rhythm at 68 bpm, normal atrial and ventricular forces, and T-wave inversions in lead 1 V5 and V6 suggestive of ischemia (unchanged). An echocardiogram showed an aortic root of 30 mm, the left atrium was dilated at 44 mm, (no old values for comparison), the right ventricle was 34 mm which was slightly increased from 32 mm, and the fractional area change of 30%, showed mildly reduced function which had improved.  The interventricular septum measured 11 on the left ventricular posterior wall 12 mm which are at the upper limits of normal, the left ventricular internal dimension in diastole was 52 and in systole 36 mm giving him measured ejection fraction of 57%.  The ejection fraction was slightly decreased but improved since the previous value.  The pulmonary valve annulus measured 19 mm. There was slight paradoxic motion of the interventricular septum, with otherwise normal-appearing right and left ventricular function. The tricuspid valve domed in diastole.  The pulmonary valve leaflets could be seen and the pulmonary annulus of 19 mm was probably underestimated.  The right atrium was dilated at 61 x 57 mm.  The left pulmonary artery measured 12 mm and the right pulmonary artery could not be seen.  The aortic arch was left-sided and unobstructed.  The gradient across the tricuspid valve was 9 peak and 4 mean mmHg gradient indicating mild stenosis, unchanged.  There was trivial tricuspid regurgitation with a regurgitant gradient of 24 mmHg suggesting normal right ventricular pressure.  The gradient across the porcine pulmonary valve was 22 peak and 13 mean mmHg  showing trivial obstruction, and there was trivial pulmonary insufficiency.  There was mild mitral insufficiency and no residual ventricular septal defect.  An exercise stress test showed it Steven exercised 7 minutes and 30 seconds of the Sam protocol, and then stopped due to fatigue and leg cramps.  Baseline blood pressure was 108/77 mmHg and then increased to 159/68 mmHg, which may be an underestimate, before returning to baseline.  Baseline heart rate was 68 bpm and increased to 147 bpm before returning to baseline.  There were initially T-wave inversions in lead I V5 and V6, which then reverted to upright during exercise, before becoming negative again in recovery.  There were no other ST segment changes or arrhythmias.      At that visit, I felt  that Steven was stable with her complex congenital heart disease.  Her right and left ventricular function had improved.  Her porcine pulmonary valve was functioning well with only mild stenosis and insufficiency.  She had mild tricuspid stenosis with significant right atrial dilatation, also unchanged.  She had left atrial dilatation which I felt was secondary to her mitral insufficiency and perhaps some diastolic dysfunction, also unchanged.  Per her report, her pacemaker was functioning properly and her rhythm was controlled on her current dose of metoprolol.  I requested records from her electrophysiologist in [place]. No cardiac intervention was recommended  A CBC, CMP, BNP, and lipid profile were drawn which showed a normal CBC, and normal CMP, a BNP of 82 pg/mL, an elevated total cholesterol of 220 mg/dL, a low HDL cholesterol of 36 mg/dL, normal triglycerides, and an elevated LDL cholesterol of 168 mg/dL.  A chest x-ray was obtained at that visit which showed mild cardiomegaly with normal vascularity, epicardial pacing leads on the right atrium and right ventricle and the AICD patch at the apex.  Sean reported that she had become engaged and  desired pregnancy in the future. At that visit I felt she would be able to tolerate a pregnancy.       At a clinic in November, 2017, Sean was not feeling well.  Approximately 3 months prior, while walking on the beach, she had the sudden onset of fatigue, shortness of breath, palpitations and sweating, but no chest pain.  Although she sat down and went into air-conditioning, the fatigue, shortness of breath and sweating persisted for another 30 minutes. Following that, she has noticed an increase in fatigue and exercise intolerance.  She has not participated in any exercise for that reason.  She reported these were the same symptoms as before her last valve replacement.  Sean also had a very busy life and lots of stresses.  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  She reported increased fatigue with these activities and decreased energy.  She had not had a pacemaker check in 9 months.  She was recently started on atorvastatin for elevated cholesterol, which was her only medication change.  She was taking metoprolol succinate 50 mg by mouth daily, enalapril 5 mg in the morning and 2.5 mg at night, aspirin 81 mg by mouth daily, atorvastatin 20 mg by mouth daily, Zoloft 100 mg by mouth daily, and venlafaxine 150 mg by mouth daily.  Her exam showed she had gained weight to 117 kg, a 2 kg weight increase. Her blood pressure in the right arm was 119/76 mmHg.  Pulse was 91 bpm and pulsed oximetry in room air was 98%.  First heart sound was normal and second heart sound was widely split. There was a grade 2/6 systolic ejection murmur best heard at the left upper sternal border and down the left sternal border.  A diastolic rumble was heard.  The liver was 2 cm below the right costal margin and unchanged.   The pacemaker was palpated in the upper mid abdomen.  Pulses were 2+ bilaterally except for 1+ in the right femoral.  There was no peripheral edema.     EKG showed sinus rhythm at a rate of 73 bpm with first-degree block (386 ms).  T-wave inversions in leads II and aVF, normal ventricular forces, and T wave inversions in leads V1 and V5, with T-wave flattening in V6.  Compared to the previous EKG, it was no change.     Echocardiogram showed the anatomy for tetralogy of Fallot status post complete repair and status post a pulmonary valve replacement.  It should be mentioned that the heart was difficult to visualize and images were of poor quality and attributed to obesity.  The aortic root measured 30 mm, unchanged, left atrium 35 mm, improved, and right ventricle 34 mm, unchanged.  The interventricular septum measured 11 and the left ventricular posterior wall 11 mm both of which were at the upper limits of normal.  The left ventricular internal dimension in diastole was 56 and in systole 43 mm giving a calculated ejection fraction of 46% which has decreased since the previous study.  There was flattening of septal wall motion.  The left ventricular posterior wall bowed posteriorly.  The pulmonary valve leaflets were difficult to see.  The tricuspid valve leaflets were difficult to see, but the tricuspid valve annulus was subjectively smaller than the mitral valve annulus.   Right ventricular function was judged subjectively to be mildly depressed, but the fractional area change was measured at 30%, which is within normal limits.   The branch pulmonary arteries could not be imaged.  The aortic arch was left-sided and unobstructed.  The gradient across the tricuspid valve was 10 peak and 5 mean mmHg indicating mild tricuspid stenosis, unchanged.  The gradient across the bioprosthetic pulmonary valve was 23 peak mmHg, unchanged.  Trivial pulmonary insufficiency.  No tricuspid insufficiency.  No aortic stenosis or  aortic insufficiency.  Trivial mitral insufficiency, improved.  No aortic arch obstruction.       The pacemaker was then downloaded by the [employer] representative, and it showed that she is atrially paced 6% of the time with no ventricular pacing.  She had a prolonged AV interval of 370 ms.  Her underlying rhythm was sinus bradycardia with a ventricular rate in the 40s.  She had no abnormal tachycardias greater than 150 bpm.  Her atrial and ventricular thresholds were good.  She had less than one year life on her battery.  We adjusted her pacemaker settings to have rates detected (monitor zone) greater than 130 bpm for 32 beats in duration.     At that visit,  my impression was Sean had a decrease in exercise tolerance and an increase in fatigue which was cardiac related.  She was difficult to evaluate by transthoracic echocardiography, probably secondary to obesity. I felt  her episode was secondary to an increase in tricuspid stenosis or bioprosthetic pulmonary valve dysfunction, and a decrease in left ventricular function.  Her symptoms may have been exacerbated by the stresses of finishing her residency, moving across the country, starting a new job, and planning a wedding.       Therefore, I began Sean on coenzyme Q 10 100 mg by mouth twice a day, for her left ventricular dysfunction, right ventricular dysfunction, and because she was started on a statin drug.   I presented her in cath/surgery conference for cardiac catheterization, where her tricuspid valve, pulmonary valve and coronary arteries could be evaluated, and possibly a Serina valve could be implanted and or a tricuspid valve balloon.  At the same time, I wanted her to have a transesophageal echocardiogram since her transthoracic imaging was poor.   I kept her on the same doses of her other medications, enalapril, metoprolol, atorvastatin, and aspirin.    Laboratory work which was obtained at that visit which subsequently showed a normal CBC  with the exception of a mildly elevated platelet count of 352,000, and a normal CMP.  Of note, her BNP was mildly elevated to  162 pg/mL, compared to the previous value.  A free T4 was normal at 0.9 ng/dL, but her free T3 was decreased at 1.8 pg/mL, although her TSH was normal at 1.4 to micro-units per milliliter.  A cholesterol profile was repeated now that she was on atorvastatin, and that showed the cholesterol had decreased to 179 mg/dL, and the LDL cholesterol had decreased to 123.4 mg/dL, which were now normal, although her HDL remained decreased at 37 mg/dL.  Sean then saw her internal medicine doctor who began her on thyroid replacement hormone.        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  A catheterization and possible Serina valve implantation has been scheduled for February 6, 2018.  Her wedding was March 10, 2018.  Her work schedule was rather demanding, with 7-9 days in-house as a hospitalist, followed by 7 days off.   Sean was feeling better than she was at her last clinic visit, and in particular had less fatigue.  She was still short of breath with climbing stairs, but has not had any further episodes of sudden onset of shortness of breath, weakness, sweating, palpitations or chest pain.  She had started a diet and light exercise program.     She was taking metoprolol XL 50 mg by mouth daily, enalapril 5 mg in the morning and 2.5 mg at night, aspirin 81 mg by mouth daily, coenzyme Q10 200 mg by mouth twice a day, atorvastatin 20 mg by mouth daily, Synthroid 150 µg by mouth daily, Zoloft 100 mg by mouth daily, and had weaned her Effexor down to 75 mg by mouth daily.     Exam revealed an obese but very pleasant and otherwise healthy appearing young woman in no acute distress.  Height was 175.3 cm and weight was 116.8 kg,  a 1 kg weight decrease since her last clinic visit. First heart sound was normal and second heart sound was widely split.  The was a grade 2/6 systolic ejection murmur heard best at the left upper sternal border, and faintly over the precordium.  Diastole was clear (the previous diastolic rumble had resolved).  The liver edge was 2 cm below the right costal margin and unchanged.  Pulses were 2+ bilaterally.  There was no peripheral edema.     EKG showed an atrially paced rhythm at a rate of 68 bpm with a prolonged WI interval of 112 ms, with right ventricular conduction delay and negative T waves in the left precordial leads, unchanged.     Echocardiogram showed the anatomy for status post repair of tetralogy of Fallot and placement of a bioprosthetic valve in the pulmonary position.  The aortic root was mildly dilated at 31 mm, unchanged, the left atrium had increased to 45 mm, and the right ventricle measured 29 mm, unchanged.  The interventricular septum measured 12 and the left ventricular posterior wall 12 mm which were at the upper limits of normal.  The left ventricular internal dimension in diastole was 53 and in systole 37 mm giving a calculated ejection fraction of 58%, which had improved significantly from her previous ejection fraction of 46% 2 months ago.  The right ventricle was not seen well enough quantitate function, but subjectively appeared improved since the previous visit.  They bioprosthetic valve was poorly seen in the pulmonary position, and the leaflets were thick and moving poorly.  The right atrium was significantly dilated at 54 x 53 mm.  The tricuspid valve was not seen well enough to measure the annulus.  pulmonary arteries could not be imaged.  The aortic arch was left-sided  The gradient across the tricuspid valve was 11 peak and 5 mean millimeters of mercury indicating mild stenosis which was unchanged.  The gradient across the right ventricular outflow tract was 27 mmHg peak.  There  was mild pulmonary insufficiency. There was trivial tricuspid insufficiency which was inadequate to estimate right ventricular pressures.       Sean was evaluated by Dr. Chintan Cabrera, who felt that Sean did not have any normal tachycardia arrhythmias.  Her defibrillator, was approaching end-of-life.  Please see Dr. Cabrera's evaluation for more details.  He recommended that Sean receive monthly pacemaker/AICD evaluations to determine the best time for replacement.     My impression at that visit was Sean's left ventricular function had improved significantly since being placed on coenzyme every 10 and thyroid replacement hormone.  I believed this had led to a decrease in symptoms and an improvement in energy level.  I continued to feel, however, that Sean still required a transesophageal echocardiogram and a cardiac catheterization and possible Serina valve replacement.  I did feel, however, that this could be delayed until after her wedding, to avoid the unlikely chance of a complication that might impact her wedding.   My opinion was that waiting an additional month to perform the catheterization would not adversely affect her health.  I discussed this with Sean and her fiancé, and they were in agreement.     Sean expressed the desire to undergo pregnancy at some time in the future.  I recommended she have her cardiac catheterization and possible Serina valve implantation, and also her AICD generator change, before becoming pregnant.  I continued the same doses of her medications.   SBE prophylaxis was recommended for dental and surgical procedures.       Sean was seen again in clinic in February, 2018.  She was generally feeling well except for one episode of tachycardia, shortness of breath and nausea that occurred out while working out with her  in the morning, and she had not yet eaten breakfast.  She had no more recurrences of these symptoms even though she has had several  training sessions since then.  She otherwise felt well, and participated in all activities including exercising and working as a hospitalist, and denied chest pain, palpitations and shortness of breath and swelling.  She had postponed her cardiac catheterization and possible Serina valve placement until after the wedding.  She continued taking metoprolol 50 mg by mouth daily, enalapril 5 mg in the morning and 2.5 mg in the evening, aspirin 81 mg by mouth daily, coenzyme Q 10 100 mg by mouth twice a day, Synthroid 150 µg by mouth daily, and Zoloft 100 mg by mouth daily.  She has been weaned off her Effexor.     Physical exam revealed an obese, pleasant and healthy-appearing woman in no acute distress.  Vital signs showed a height of 175.3 cm or 5 feet 9 inches and a weight of 119 kg or 262 lbs. 7 oz., which is a weight increase of 2.2 kg since her last clinic visit.  Blood pressure in the right arm was 107/73 mmHg, pulse 87 bpm, and pulsed oximetry in room air 98%. Examination of the skin showed it to be pink and well perfused.  The lungs were clear.  Palpation of the precordium showed it to be normal with a well-healed midline scar, and a pacemaker palpated in the abdomen.  The liver edge was 2 cm below the right costal margin, unchanged.  Pulses were 2+ bilaterally.  There was no peripheral edema.     EKG was obtained which showed a paced atrial rhythm at 78 bpm, with right ventricular conduction delay, and T-wave inversions in the limband precordial leads suggestive of ischemia, unchanged.     Echocardiogram showed evidence for repair of tetralogy of Fallot status post pulmonary valve replacement.  Two-dimensional imaging was poor.  M-mode parameters were as follows: The aortic root measured 30 mm, left atrium 43 mm, right ventricle 26 mm, interventricular septum 12 and left ventricular posterior wall 12 mm, the left ventricular internal dimension in diastole was 56 and in systole 36 mm giving a calculated  ejection fraction of 55% and these numbers are normal.  The right ventricle function was judged subjectively to be reduced but unable to be quantitated The bioprosthetic valve in the pulmonary position was difficult to see, however thickened leaflets with poor movement were observed.  The aortic arch was left-sided and unobstructed.  The superior vena cava drains normally to the right atrium.  Doppler analysis using pulsed continuous-wave and color-flow:  The there was turbulence across the tricuspid valve with a gradient of 14 peak and 10 mean millimeters of mercury indicating mild tricuspid valve stenosis which was unchanged.  Mitral insufficiency was present which was mild.  The gradient across the bioprosthetic pulmonary valve was 32 mmHg peak in the setting of reduced right ventricular function.  No residual ventriculoseptal defect.  No aortic stenosis or aortic insufficiency. Impression: Status post repair of tetralogy of Fallot.  Poor two-dimensional imaging.  Decreased right ventricular function.  Bioprosthetic pulmonary valve with thickened and poorly moving leaflets, and a 32 mmHg peak gradient across it in the setting of reduced right ventricular function.  Mild tricuspid valve stenosis with a 14 peak and 10 mean millimeter gradient across it, unchanged.     Sean was then evaluated by electrophysiologist Dr. Chintan Cabrera, performed a download of her pacemaker, and that showed that there was a 6 beat run of ventricular tachycardia which resolved spontaneously, and that the pacemaker generator close to end-of-life.     My impression from that visit was that Sean had right ventricular dysfunction and pulmonary valve dysfunction, in particular pulmonary valve stenosis.   I felt that she required a pulmonary valve replacement which hopefully could be performed with a Serina valve.  I believed her right ventricular function would improve after that.  She also has ventricular tachycardia, but it was  self-limited and she was protected by both her metoprolol and her AICD.  The generator also required replacement.  Dr. Cabrera has recommended the metoprolol be increased to 50 mg by mouth twice a day.   Following this clinic visit, Sean called with some leg swelling, and was placed on Lasix 20 mg by mouth daily, in addition to her other medications.     Sean had an uneventful wedding and honeymoon.  She underwent replacement of her ICD generator by Dr. Chintan Cabrera on April 4, 2018.  An ICD PAWAN NAVA S  [id number]: Serial No. [id number] was placed in the abdominal pocket and the previous generator removed.  She tolerated the procedure well.  Lead testing revealed:      RV Lead:       Threshold: 1.3 V / 0.4 ms       Impedance: 646 ohms       R wave: 5.9 mV  RA Lead:       Threshold: 1.3 V / 0.4 ms       Impedance: 589 ohms       P wave: .5 mV     Sean underwent transesophageal echocardiogram and cardiac catheterization under general anesthesia on April 26, 2018.  Transesophageal echocardiogram showed:     Mild right ventricular dilatation.  Paradoxic motion of the interventricular septum.    Normal left ventricle structure and size, with normal left ventricular posterior wall motion.  Normal to mildly decreased right ventricular free wall motion.  No pericardial effusion with normal-appearing pericardium.  No atrial or ventricular shunts.  Tethering of the tricuspid valve septal leaflet, with mild doming mild tricuspid valve prolapse.  Normal tricuspid valve velocity, and mild tricuspid valve insufficiency, with a 29 mmHg gradient.    Immobile and echodense posterior leaflet of the prosthetic pulmonary valve, with a peak gradient of 17 mmHg and mild pulmonary insufficiency.     Catheterization showed:     Saturations: SVC 69%, RA 72%, right pulmonary artery 69%, left pulmonary artery 69%, aortic 99%.    Pressures in mmHg:  RA mean 19, RV 50/19, RPA  40/14 mean  25, LPA 43/16 mean 26, RPCWP mean 20,  "LPCWP mean 20, LV 101/18, ascending aorta 101/59  mean  77, and descending aorta 99/58 mean 75.    Calculations:  Using a hemoglobin of 11.3,  the Qp equaled to Qs at 2.55 L/min/m2, which gave a pulmonary vascular resistance of 2.36 Wood units/m2.  The raw cardiac output was 5.73 liters per minute.     Coronary angiography was then performed which showed normal left and right coronary arteries.  No other angiography was performed.  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     Sean's postprocedure follow-up clinic visit was on May 1, 2018.  Since her discharge from the hospital several days prior, she had been feeling generally well.  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  She had in general noticed increasing shortness of breath with walking compared to 6 months ago.  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  Her swelling had improved after starting the Lasix, she only noticed ankle swelling when on her feet for several hours.  She also noticed an improvement in palpitations after increasing her metoprolol to 50 mg by mouth twice a day, however she continued to experience palpitations every night when lying down, which lasted for a few seconds.  She had occasional "tingling" in her left upper chest which lasted for several seconds and occurred once a week, but no chest pain.  She had not exercised in over one month.  She has continued to gain weight.     Sean was taking Lasix 20 mg by mouth daily added about 2 months prior, coenzyme every 10 100 mg by mouth twice a day, enalapril 5 mg in the morning and 2.5 mg at night, metoprolol tartrate 50 mg by mouth twice a day, Synthroid, and atorvastatin.  She was no longer taking aspirin or Zoloft.     Physical exam revealed a pleasant, obese and healthy-appearing " young woman in no acute distress.  Vital signs showed a height of 175.3 cm or 5 feet 9 inches, and a weight of 121.3 kg or 267 lbs. 6 oz., which is an increase of 2 kg from her last clinic visit.  Blood pressure was 105/64 mmHg and heart rate 65 bpm.  Pulsed oximetry was not obtained     Examination of the skin showed it to be pink and well perfused area the lungs were clear.  Palpation of the precordium showed it to be normal with a well-healed midline scar.  First heart sound was normal and second heart sound was widely split.  There was a grade 2/6 systolic ejection murmur fairly well localized to the left upper sternal border.  I no longer head the diastolic rumble.  The liver edge was 2 cm below the right costal margin.  Pulses were 2+ bilaterally.  The wounds were healed.  There was no peripheral edema.     EKG showed sinus rhythm at 64 bpm, with first-degree block and a LA interval of 266 ms, right ventricular hypertrophy, and T-wave inversions in the inferior and precordial leads. This EKG had not changed since the previous one, however the T-wave in lead V6 has become inverted in the last year.     I reviewed the cardiac catheterization, pressure tracings and transesophageal echocardiogram with Sean and her .  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  I felt that this was the primary cause of her symptoms of exercise intolerance, fatigue and peripheral edema.  The cause of this was perplexing. It was possible that her AICD patch was contributing to diastolic dysfunction, and I felt she should also be worked up for pericardial and myocardial infiltrative diseases.  I continued to feel, however, that she would still benefit from a new pulmonary valve, since one of the valve leaflets was frozen, and the valve had been in for 13 years.  I believed that the pulmonary valve stenosis,  insufficiency and tricuspid valve stenosis was a minor contributor to her right-sided dysfunction and symptoms.  For now, I felt she should be medically managed.  Sean actually looked quite well at that visit, and therefore was continued on her current medical management.  I also felt that her persistent weight gain and obesity were contributing to exercise intolerance.  Sean was also complaining of palpitations which needed to be characterized.     Therefore, Sean was kept on the same doses of her Lasix, coenzyme Q 10, enalapril, Synthroid, and atorvastatin.  We placed a 48 hour Holter monitor as surveillance for arrhythmias.  She was advised to slowly begin an exercise program and continue to lose weight with diet.  Laboratory work including an GWEN, rheumatoid factor, complement, sedimentation rate, high specificity CRP, CBC, reticulocyte count, BNP, and CMP.  These results subsequently showed: CBC was within normal limits; the CMP was normal with the exception of a bilirubin of 2.0 mg/dL and a bicarbonate of 22 mmol per liter; the BNP was elevated at 209 pg/mL; the high sensitivity CRP was elevated at 4.72 mg/L; the TSH was 0.037 micro international units per milliliter; the sedimentation rate was elevated at 46 mm/h; the C3 complement was elevated at 185 mg/dL.  The free T4, C4 complement, rheumatoid factor, GWEN and reticulocytes were all normal.  These results indicated an inflammatory process.  Sean was then referred to a rheumatologist to see if an inflammatory disease was contributing to her constrictive pericarditis.  She did not obtain that consultation. Sean's  Holter monitor subsequently showed sinus rhythm at rates 59 - 210 bpm with a heart average heart rate of 64 bpm, frequent premature atrial beats, atrial bigeminy and occasional atrial couplets, with a total of 1766 premature atrial beats in 24 hours, and rare PVCs.  The increase in atrial ectopy was new.     Sean also was  evaluated by Dr. Thad Ny at [facility] adult congenital heart disease program in [place].  Dr. Ny agreed that Sean had developed a constrictive pericarditis, and he felt it might be secondary to the AICD patch over the cardiac apex, and possibly intrinsic restrictive physiology from her tetralogy of Fallot.  He did not think a Serina valve placement was indicated at that time.  He felt that her tricuspid stenosis may have been secondary to her initial surgical repair.  He felt that her obesity was contributing to her dyspnea, and suggested a possible sleep study.  He felt that she probably would be able to tolerate a pregnancy in the future, but would be in a high risk category.  He recommended that her diuresis be increased and that her Lasix be increased to 40 mg by mouth daily.     Sena was then seen in May, 2018.  Since her last clinic visit 6 weeks prior, Sean was feeling better.  She had begun an exercise program with a , did primarily weight lifting but also walked on a treadmill for about 5 minutes.  She reported less shortness of breath with exercise.  Her energy level had improved since her last clinic visit.  Her previous complaints of palpitations had resolved. She was now active working 7 days at a time.  She was dieting and has lost about 12 pounds.     Sean was currently taking Lasix which had been increased to 40 mg by mouth daily and to which she has a good diuretic response, coenzyme every 10 100 mg by mouth twice a day, metoprolol tartrate 50 mg by mouth twice a day, enalapril 5 mg in the morning and 2.5 mg at night, Synthroid which had been decreased to 137 µg daily, Singulair 10 mg by mouth daily, levo cetirizine 5 mg by mouth daily, fish oil and multivitamins.     Physical exam revealed a pleasant, healthy-appearing obese young woman in no acute distress.  Vital signs showed a height of 175.3 cm or 5 feet 9 inches, and a weight of 116.1 kg or 255 lbs. 15  oz., which was a 5 kg weight decrease since her last clinic visit.  Blood pressure in the right arm was 107/71 mmHg, pulse 84 bpm and pulsed oximetry in room air 95%.     Examination of the skin showed it to be pink and well perfused.  The lungs are clear.  Palpation of the precordium showed it to be normal with a well-healed midline scar.  First heart sound was normal and second heart sound widely split.  The was a grade 2/6 systolic ejection murmur best heard at the left upper sternal border but also over the precordium. Diastole was clear.  The liver edge was 2 cm below the right costal margin.  Pulses were 2+ bilaterally.  There was no peripheral edema.     EKG was obtained which showed an atrially paced rhythm at 70 bpm with an AV conduction time of 360 ms, right ventricular hypertrophy, and T-wave inversions in leads I, II, V5 and V6 and T-wave flattening in leads aVF indicating ischemia, with a QTc interval of 441.  Compared to the previous study there was a negative T-wave in lead I.     An echocardiogram was obtained which showed status post repair of tetralogy of Fallot.  Two-dimensional imaging was poor.  M-mode parameters were as follows: The aortic root measured 30 mm unchanged, left atrium was significantly dilated at 46 mm increased, right ventricle 20 mm by M-mode and 35 mm in the long axis view, the interventricular septum measured 10 and the left ventricular posterior wall 10 mm which were both normal.  The left ventricular internal dimension in diastole was 54 and in systole 41 mm giving a calculated ejection fraction of 47%.  The left ventricular ejection fraction has decreased since the previous study (56%).  There was no pericardial effusion.  There was decreased excursion of the septal leaflet of the tricuspid valve leading to inadequate tricuspid valve opening in diastole.  The pulmonary valve was echo dense and the posterior leaflet had decreased motion although the valve was poorly seen in  general.  The ventriculoseptal defect patch was in proper position.  The right atrium was dilated and measured 47 x 46 mm. There was subjectively normal right ventricular function with a fractional area change of 47%.  There was poor subcostal imaging.  The aortic arch was left-sided and unobstructed. The right superior vena cava drained normally to the right atrium.  Doppler analysis using pulsed, continuous wave and color-flow: Tricuspid stenosis was present with a 12 peak and 5 mean millimeter gradient across it indicating mild + tricuspid stenosis which was unchanged.  The gradient across the pulmonary valve was 25 mmHg peak indicating mild obstruction and there was mild to moderate pulmonary insufficiency.  No tricuspid insufficiency.  Trivial mitral insufficiency.  No aortic stenosis, aortic insufficiency, or residual ventriculoseptal defect.  Impression: Status post repair of tetralogy of Fallot with bioprosthetic valve in the pulmonary position.  Echodense pulmonary valve with decreased motion of the posterior leaflet, but only a 25 mmHg peak gradient across it indicating mild obstruction in the presence of normal right ventricular function, with mild to moderate pulmonary insufficiency.  Moderate left ventricular dysfunction which has increased.  Normal right ventricular function.  Dilated left atrium which has increased.  Dilated right atrium.  Tricuspid stenosis with decreased excursion of the septal leaflet.     An exercise stress test was performed which showed that [place] exercise 6 minutes and 49 seconds of the Sam protocol, indicating poor exercise tolerance for age.  Baseline heart rate was 63 bpm, increased to 137 bpm at peak exercise before decreasing back to 71 bpm 9 minutes into recovery.  This was a suboptimal peak heart rate which was probably secondary to beta-blockade.  Baseline blood pressure was 98/63 mmHg in the right arm, increased to 117/65 mmHg 1 minute into recovery, before  decreasing back to 103/51 mmHg 7 minutes into recovery.  This was a blunted pressure response to exercise and may have been due to beta-blockade and ACE inhibition.  Baseline saturation was 97% and decreased to 87% at peak exercise, before increasing back to 98% 1 minute into recovery.  There were desaturations at peak exercise that probably represented intrapulmonary shunting.  Sinus rhythm with frequent PACs and occasional atrial couplets were present starting in stage II, which then resolved at peak exercise.  Occasional PACs were then seen again in recovery.   There were no ST segment depressions throughout exercise, in fact, the negative T-wave in leads 2 and V5 improved during exercise.  It should be noted that the patient was examined after exercise and the lungs were clear.  Impression: Suboptimal level of exercise for age, with blunted heart rate response and blood pressure response.  Moderate desaturations with exercise to 87% which then resolved.  Premature atrial complexes with atrial couplets increase in stage II and then are suppressed. Improvement in T-wave inversions throughout exercise.     Sean was also evaluated by Dr. Chintan Cabrera who did not detect any arrhythmias on her newly placed generator download, and did not make any recommendations in pacemaker or rhythm management.  He asked to see her again in 6 months.     My impression from that visit was Sean had improved since her last clinic visit.  She had improved symptoms of exercise intolerance, improved energy level, improved activity, and her palpitations had resolved. She was now exercising regularly and losing weight.  She still had poor exercise tolerance, however, and desaturated with exercise, which I suspected was intrapulmonary shunting.  I did not detect pulmonary edema on exam during exercise.  She had a progression in left ventricular dysfunction for which I found no etiology.  She continued to have mild pulmonary valve  stenosis with mild to moderate pulmonary insufficiency of her bioprosthetic valve.  Her right ventricular function had improved. She also had mild tricuspid valve stenosis, and moderate right atrial and left atrial dilatation which had increased.  Her recent Holter monitor has showed no ventricular ectopy, rather PACs, atrial couplets and triplets.  I felt that Sean's cardiac condition had improved with increasing diuresis and increasing exercise capacity and also weight loss.  I continued to be concerned by her diagnosis of constrictive pericarditis, her left ventricular dysfunction, and her intrapulmonary shunting.  I also felt that weight lifting was not the best exercise for her, and asked her to switch more to cardio training and less weight lifting.     Sean was instructed to increase her coenzyme Q10 to 200 mg by mouth twice a day, and stay on the same doses of her other medications.  Laboratory studies were ordered including a CBC, CMP, BNP, complement C3, CRP and a sedimentation rate, but were not obtained.   She was given a return to this clinic in 3 months time with an EKG and an echocardiogram.  SBE prophylaxis continued to be in order for all dental and surgical procedures.     Sean  was seen on October 4, 2018.  Since her previous clinic visit 4 months prior, she continued to improve and felt well.  She has continue to diet and lost another 14 lb in 4 months, in addition to the 12 lb she had lost at her last clinic visit.  She is exercising regularly which includes Pilates, ballet bar, light weight training, and cardio roping machine, in addition to once a week with a .  She denied shortness of breath, chest pain, palpitations or swelling.  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  She was taking her medicine regularly.  She continues to have a waqar IUD, which she was planning on removing in March and then attempting pregnancy.  Her previous  pacemaker download was October 3, 2018, the results of which were not yet available.     Sean was taking enalapril 5 mg in the morning and 2.5 mg in the evening, coenzyme Q10 200 mg p.o. b.i.d., metoprolol tartrate 50 mg p.o. b.i.d., Lipitor 20 mg p.o. q.day, Lasix 40 mg p.o. q.day to which she has a good diuretic response, levo cetirizine 5 mg p.o. q.day, Synthroid 137 mcg p.o. q.day, and Singulair 10 mg p.o. q.day.     Physical exam revealed a healthy-appearing and pleasant young woman in no acute distress.  Vital signs showed a height 175.3 cm or 5 ft 9 in, and weight of 109.6 kg or 241 lb 12 oz, which is a 14 lb weight decrease since her previous clinic visit in May, 2018.  Pulse was 86 beats per minute, pulse oximetry in room air 97%, and blood pressure in the right arm 107/68 mm of mercury.     Examination of the skin showed it to be pink and well perfused.  The lungs were clear.  Palpation of the precordium showed to be normal with a well-healed midline scar.  First heart sound was normal and 2nd heart sound was widely split.  There was a grade 2/6 systolic ejection murmur best heard at the left upper sternal border, but well over the precordium.  Diastole was clear.  The liver edge was at the right costal margin and improved.  Pulses were 2+ bilaterally. There was no peripheral edema.  The pacemaker was palpated in the abdomen.     EKG was obtained which showed a paced atrial rhythm at 68 beats per minute, right ventricular conduction delay, and T-wave inversions in leads I,II, AVF, V1 - V6, unchanged.     An echocardiogram was obtained which showed evidence for repair of tetralogy of Fallot, and a bioprosthetic valve in the pulmonary position.  M-mode parameters were as follows:  The aortic root measured 30 mm unchanged, left atrium 44 mm decreased slightly (46 mm), right ventricle 28 mm by m mode and 35 mm by  2D in long axis, unchanged.  The interventricular septum measured 10 and left ventricular  posterior wall 11 mm, both normal.  The left ventricular internal dimension in diastole was 52 and systole 36 mm giving a calculated ejection fraction of 59%, he which has improved and is now normal (previously 47%).  The pulmonary valve leaflets were thickened with decreased mobility, with the posterior leaflet being frozen, as noted previously.  The pulmonary valve annulus was small at 16 mm.  The right atrium was dilated measuring 56 x 47 mm which had increased.  There was decreased opening of the tricuspid valve with tethering of the septal leaflet.  Both right and left ventricular function were judged subjectively to be within normal limits.  The ventricular septal defect patch was in proper position.  Doppler analysis using pulse, continuous and color-flow:  No tricuspid insufficiency.  Peak gradient across the tricuspid valve was 11 peak and 6 mean mm of mercury, unchanged.  Peak gradient across the bioprosthetic pulmonary valve was 30 peak and 18 mean mm of mercury, slightly increased parentheses 25 mm of mercury peak).  Pulmonary insufficiency was present which was mild and improved.  No mitral insufficiency.  No aortic stenosis or aortic insufficiency.  The gradient across the mitral valve was 2 peak and 1 mean mmHg with E wave dominance suggestive of constrictive pericarditis.  Impression:  Tetralogy of Fallot status post placement of a bioprosthetic valve in the pulmonary position.  Normal right ventricular size and function. Normal left ventricular size and function which has returned to normal. Hypoplastic pulmonary annulus measuring 16 mm, with thickened leaflets and frozen posterior leaflet, with a 30 mm Hg peak gradient suggesting mild obstruction which has increased slightly, and mild pulmonary insufficiency which has improved.  Dilated right atrium which has increased to 56 x 47 mm, and dilated left atrium which is stable to slightly improved at 44 mm.  Decreased excursion of the septal leaflet of  the tricuspid valve leading to reduced flow and an 11 peak and 6 mean mmHg gradient, unchanged.  No gradient across mitral valve but E wave dominance, consistent with constrictive pericarditis.       An exercise stress test was obtained which showed that Sean exercised 7 min and 18 sec of the Sam protocol, 1 min and 18 sec into stage 3, which was below predicted for an adult, but improved from the previous study by about 30 sec.  Baseline heart rate was 63 beats per minute, increased to 159 beats per minute at peak exercise, before decreasing back to 75 beats per minute at the end of 7 min of recovery. Baseline blood pressure was 115/64 mm of mercury, increased to 140/77 mm of mercury 1 min after peak exercise, before decreasing back to 108/65 mm of mercury 7 min into recovery.  Saturation was 98% at baseline, and then decreased to 94% at peak exercise, before returning to 99% 1 min into recovery.  Patient remained fully saturated throughout the remainder of the study.  Baseline rhythm was sinus at 63 beats per minute, and patient continued in sinus rhythm throughout exercise and recovery.  Occasional unifocal PVCs were seen starting in stage II and III, which resolved in recovery.  There were no PACs.  Baseline EKG had negative T-waves in leads 1, 2, AVF, and V1 through V6.  During exercise, T-waves became flattened in leads 1 and 2 and less negative in leads AVF, V1 through V6.  At peak exercise, T-waves were upright in leads 1, and 2, flattened in AVF, and upright in V1 through V6.  During recovery, T-waves again became negative in leads 1, 2, and AVF, V1 through V6.  Impression:  Low level of exercise predicted for age, however an improvement from the previous study of 30 sec.  Normal blood pressure and her heart rate response to exercise.  Mild desaturations to 94% at peak exercise, which recovers within 1 min to 99%.  Occasional unifocal PVCs during exercise which resolved in recovery, and no PACs, which  is an improvement from the previous study.  Inverted T-waves in inferior and precordial leads which convert to positive during exercise, and then revert back to negative during recovery.     My impression from this visit was that Sean had improvement in cardiac function, rhythm, energy, and exercise tolerance.  I attributed this to an improvement in left ventricular function, improvement in right ventricular function, a decrease in weight, an increase in exercise training, and an improvement in arrhythmias.  She still has evidence for a thickened and poorly functioning bioprosthetic valve, however the gradient across it was only 30 mm of mercury peak, with mild pulmonary insufficiency, and her right ventricular function had improved to normal. I still felt that she would need a Serina valve placement in the near future.  She still had evidence for constrictive pericarditis as evidence by her dilated right and left atria, and Doppler flow pattern in the left ventricle, however, I saw an overall improvement in cardiac function leading to decreased symptoms.     Sean's reasons for improvement were probably multifactorial, and I felt no reason to change any of her management.  Therefore, Sean  was instructed to continue on the same doses of her medications.  She was to continue her successful diet and exercise program.  I contacted Dr. Cabrera to ask for the results of her recent pacemaker download.  I suspected that she and her fetus would tolerate a pregnancy in the future.  Sean was instructed to return to this clinic in about 3 months time, at which point she would have a dual appointment with electrophysiologist Dr. Cabrera and myself, with an EKG and an echocardiogram.  Laboratory work was ordered including CBC, CMP, BNP, thyroid function studies and lipid profile.  SBE prophylaxis was in order for dental and surgical procedures.         Laboratory work obtained on October 4th, 2018 showed a T4 normal  at 9.6 mcg/dL, T3 at the lower limit normal 2.3 pg/mL, TSH at the lower limit of normal at 0.414 micro international units per mL, cholesterol was 119 mg/dL, triglycerides 59 mg per dL, HDL was low at 34 mg/dL, LDL 73.2 mg/dL.  C3 complement was now normal at 145 mg/dL, high sensitivity CRP was now normal at 2 point 7 5 mg per dL, and BNP was elevated at 170 8 pg/mL which had improved.  The CMP was normal except for an elevated total bilirubin of 1.8 mg/dL which had improved.  The CBC was normal with a hemoglobin of 12.4 grams/deciliter and hematocrit 38.5%.    Sean was seen in the [facility] on February 14, 2019.  Since her clinic visit 4 months prior, or near has been feeling well.  She is exercising, which consists doing some classes, rides a bicycle for 30 min, and works with a , and her exercise capacity has improved.  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  She reports a good energy level which has improved.  She denies chest pain, shortness of breath or palpitations.  She does notice swelling of her lower legs when working her 12 hr shifts, during which time she is on her feet quite a bit.    Sean is taking Lasix 40 mg p.o. q.day, to which she has a good diuretic response, metoprolol tartrate 50 mg p.o. b.i.d., enalapril 5 mg in the morning and 2.5 mg in the evening, coenzyme Q10 200 mg p.o. b.i.d., Synthroid 137 mcg p.o. q.day, Lipitor 20 mg p.o. q.day, cetirizine 10 mg p.o. q.day, fish oil and a multivitamin.  She has stopped taking Singulair.    Physical exam revealed an obese, pleasant and healthy-appearing young woman in no acute distress.  Vital signs showed a height of 5 ft 9 in or 175.3 cm, and weight of 112.4 kg or 247 lb 13 oz, which is a 3 kg weight increase since her previous clinic visit.  Blood pressure was 117/63 mm of mercury in the right arm, pulsed oximetry in room air 97%, and pulse was 71 beats  per min.    Examination of the skin showed it to be pink and well perfused.  The lungs were clear.  Palpation of the precordium showed to be normal with a well-healed midline scar.  First heart sound was normal and 2nd heart sound was split.  There was a grade 2-3/6 systolic ejection murmur heard best at the left upper sternal border with minimal radiation.  There was also a grade 2/6 diastolic murmur heard at the left lower sternal border, and also probably a diastolic rumble.  The liver edge was 2 cm below the right costal margin, unchanged.  The pacemaker was palpated in the upper mid abdomen.  Pulses were 2+ bilaterally.  There was no peripheral edema.    An EKG was obtained which showed an atrially paced rhythm at 70 beats per minute, with a long PVR interval of 344 milliseconds.  There was an RV conduction delay, and negative T-waves in leads 1, 2, V5 and V6 which were unchanged.  Compared to the previous study, the patient is now atrially paced.    An echocardiogram was obtained which had very poor two-dimensional imaging, secondary to a different machine being use, and patient obesity.  Chamber sizes were made on 2 dimensional imaging as M-mode measurements were suboptimal.  The interventricular septum measured 10 and the left ventricular posterior wall 11 mm, no change.  The right ventricular dimension in the long axis view was 32 mm, improved (35 mm).  The left ventricular internal dimension diastole was 53 and in systole 40 mm giving a calculated ejection fraction of 49%, showing mild LV dysfunction which has decreased (previously 59%).  The left atrium was dilated measuring 45 mm, unchanged.  The aortic root measured 31 mm, unchanged.  The ascending aorta measured 25 mm.  The pulmonary valve was difficult to image, and the posterior leaflet was thickened and had decreased motion, and the pulmonary valve annulus was 22 mm.  In the four-chamber view, the left atrium measured 56 x 52 mm.  The right atrium  measured 52 x 45 mm, unchanged.  The tricuspid valve was poorly seen.  Subcostal views could not be obtained.  The aortic arch was left-sided and unobstructed.  Doppler analysis using pulse, continuous and color-flow:  Mitral insufficiency was present which was mild, a new finding.  Pulmonary insufficiency was present which was mild with a end-diastolic gradient of 4 mm of mercury, unchanged.  The gradient across the bioprosthetic pulmonary valve was 37 mm of mercury peak, which has increased since the previous value (30 mm of mercury).  The gradient across the tricuspid valve was 13 peak and 6 mean mm of mercury, similar to previous value.  The E to a ratio across the mitral valve was 1.52.  Tricuspid insufficiency was present which was trivial.  No aortic stenosis or aortic insufficiency.  Impression:  Very poor imaging probably secondary to technical difficulties as well as patient obesity, leading to difficulty imaging and measuring all cardiac structures.  Tetralogy of Fallot status post pulmonary valve replacement.  Normal size right ventricle which has increased since the previous study, with subjectively normal right ventricular function.  Mildly decreased left ventricular function with ejection fraction 49%.  Significantly dilated left atrium and right atrium, unchanged.  Decreased motion and thickening of the posterior leaflet of the bioprosthetic pulmonary valve, with a 37 mm Hg gradient which has increased.  New mild mitral insufficiency.  Mild pulmonary insufficiency.  Mild to moderate tricuspid stenosis, unchanged.      Laboratory values were obtained which showed a normal CBC with a hemoglobin of 13 grams/deciliter and a hematocrit of 40%.  The free T4 was normal at 1.4, and the CMP was normal except for a mildly elevated bilirubin of 1.8, unchanged.  The BNP was mildly elevated at 156 peak a g per mL, unchanged.  The high density CRP was normal at 2.24.  The free T3, TSH, vitamin-D and vitamin-B  levels were pending.    Sean was also evaluated by electrophysiologist Dr. Chintan Cabrera.  Dr. Cabrera found that there were no new arrhythmias and the pacemaker was functioning well.  He did not recommend any changes.  Please see Dr. Cabrera note for more details.    My impression from this visit was that Sean may have had a decrease in left ventricular function the etiology of which is not evident, but the quality of the echocardiogram was so poor that I feel this needs to be repeated on a better quality machine.  Her right ventricular size and function are normal.  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  Her bioprosthetic pulmonary valve gradient has increased from 30-37 mm of mercury peak, and continues to have a posterior leaflet which moves poorly.  The previous inflammatory process that occurred last year has resolved, as evidenced by a return of her CRP to normal.  I wondered whether this participated in the development of her constrictive pericarditis, although she has another reason to have this from her epicardial AICD patch.    Sean desires to become pregnant within the next few months.  In terms of tolerating her pregnancy, her biggest risk factor is the constrictive pericarditis.  I still feel that the stenotic bioprosthetic pulmonary valve is contributing to her cardiac dysfunction, and that she should have a Serina valve placed prior to pregnancy.  I feel she will be at moderate risk for pregnancy but should be able to tolerated with careful monitoring from the Cardiology and Maternal-Fetal services.    Despite these issues, Sean continues to improve clinically, as evidenced by improved energy and exercise tolerance.  I am attributing this to her weight loss and exercise training.  Therefore, Sean was instructed to stay on the same doses of her medications.  I have asked her to return to this  clinic in 1 months time with an EKG, an echocardiogram on a better quality machine, and an exercise stress test.  Following that I will present her in conference for Serina valve placement prior to pregnancy.  I will also arrange for her to have a maternal fetal evaluation in the future.    Further disposition for the cardiac status of Sean Baez will be determined following the rest of her laboratory studies, her repeat echocardiogram on a better machine, an exercise stress test, and her discussion in Cardiology Conference.  SBE prophylaxis continues to be in order for dental and surgical procedures.    Thank you very much for allowing up to participate the care of your patient.       Sincerely      Chantel Saleh

## 2019-02-14 ENCOUNTER — HOSPITAL ENCOUNTER (OUTPATIENT)
Dept: CARDIOLOGY | Facility: OTHER | Age: 34
Discharge: HOME | End: 2019-02-14
Attending: PEDIATRICS
Payer: COMMERCIAL

## 2019-02-14 ENCOUNTER — CLINICAL SUPPORT (OUTPATIENT)
Dept: PEDIATRIC CARDIOLOGY | Facility: CLINIC | Age: 34
End: 2019-02-14
Attending: PEDIATRICS
Payer: COMMERCIAL

## 2019-02-14 ENCOUNTER — OFFICE VISIT (OUTPATIENT)
Dept: CARDIOLOGY | Facility: CLINIC | Age: 34
End: 2019-02-14
Payer: COMMERCIAL

## 2019-02-14 VITALS
DIASTOLIC BLOOD PRESSURE: 68 MMHG | WEIGHT: 241 LBS | HEART RATE: 86 BPM | BODY MASS INDEX: 35.7 KG/M2 | SYSTOLIC BLOOD PRESSURE: 107 MMHG | HEIGHT: 69 IN

## 2019-02-14 VITALS
HEART RATE: 72 BPM | BODY MASS INDEX: 36.7 KG/M2 | DIASTOLIC BLOOD PRESSURE: 63 MMHG | BODY MASS INDEX: 36.7 KG/M2 | WEIGHT: 247.81 LBS | HEIGHT: 69 IN | HEIGHT: 69 IN | SYSTOLIC BLOOD PRESSURE: 117 MMHG | OXYGEN SATURATION: 97 % | OXYGEN SATURATION: 97 % | SYSTOLIC BLOOD PRESSURE: 117 MMHG | HEART RATE: 71 BPM | DIASTOLIC BLOOD PRESSURE: 63 MMHG | WEIGHT: 247.81 LBS

## 2019-02-14 DIAGNOSIS — Z95.810 AICD (AUTOMATIC CARDIOVERTER/DEFIBRILLATOR) PRESENT: ICD-10-CM

## 2019-02-14 DIAGNOSIS — Z87.74 TETRALOGY OF FALLOT S/P REPAIR: ICD-10-CM

## 2019-02-14 DIAGNOSIS — I42.5 CONSTRICTIVE CARDIOMYOPATHY: ICD-10-CM

## 2019-02-14 DIAGNOSIS — Q24.9 ADULT CONGENITAL HEART DISEASE: ICD-10-CM

## 2019-02-14 DIAGNOSIS — Z95.0 CARDIAC PACEMAKER IN SITU: ICD-10-CM

## 2019-02-14 DIAGNOSIS — I47.20 VT (VENTRICULAR TACHYCARDIA): ICD-10-CM

## 2019-02-14 DIAGNOSIS — Q21.3 TOF (TETRALOGY OF FALLOT): ICD-10-CM

## 2019-02-14 DIAGNOSIS — I36.0 NONRHEUMATIC TRICUSPID (VALVE) STENOSIS: ICD-10-CM

## 2019-02-14 DIAGNOSIS — Z87.74 TETRALOGY OF FALLOT S/P REPAIR: Primary | ICD-10-CM

## 2019-02-14 DIAGNOSIS — Z95.2 S/P PULMONARY VALVE REPLACEMENT: ICD-10-CM

## 2019-02-14 DIAGNOSIS — I49.5 SINUS NODE DYSFUNCTION: ICD-10-CM

## 2019-02-14 DIAGNOSIS — E03.9 ACQUIRED HYPOTHYROIDISM: ICD-10-CM

## 2019-02-14 PROCEDURE — 93303 PR ECHO XTHORACIC,CONG A2M,COMPLETE: ICD-10-PCS | Mod: S$GLB,,, | Performed by: PEDIATRICS

## 2019-02-14 PROCEDURE — 93283 CV ICD PROGRAMMING PEDIATRICS (CUPID ONLY): ICD-10-PCS | Mod: S$GLB,,, | Performed by: PEDIATRICS

## 2019-02-14 PROCEDURE — 3008F BODY MASS INDEX DOCD: CPT | Mod: CPTII,S$GLB,, | Performed by: PEDIATRICS

## 2019-02-14 PROCEDURE — 93005 ELECTROCARDIOGRAM TRACING: CPT

## 2019-02-14 PROCEDURE — 99214 OFFICE O/P EST MOD 30 MIN: CPT | Mod: 25,S$GLB,, | Performed by: PEDIATRICS

## 2019-02-14 PROCEDURE — 93283 PRGRMG EVAL IMPLANTABLE DFB: CPT | Mod: S$GLB,,, | Performed by: PEDIATRICS

## 2019-02-14 PROCEDURE — 93325 DOPPLER ECHO COLOR FLOW MAPG: CPT | Mod: S$GLB,,, | Performed by: PEDIATRICS

## 2019-02-14 PROCEDURE — 3008F PR BODY MASS INDEX (BMI) DOCUMENTED: ICD-10-PCS | Mod: CPTII,S$GLB,, | Performed by: PEDIATRICS

## 2019-02-14 PROCEDURE — 93010 EKG 12-LEAD: ICD-10-PCS | Mod: ,,, | Performed by: INTERNAL MEDICINE

## 2019-02-14 PROCEDURE — 93000 PR ELECTROCARDIOGRAM, COMPLETE: ICD-10-PCS | Mod: 77,S$GLB,, | Performed by: PEDIATRICS

## 2019-02-14 PROCEDURE — 99215 PR OFFICE/OUTPT VISIT, EST, LEVL V, 40-54 MIN: ICD-10-PCS | Mod: S$GLB,,, | Performed by: PEDIATRICS

## 2019-02-14 PROCEDURE — 93010 ELECTROCARDIOGRAM REPORT: CPT | Mod: ,,, | Performed by: INTERNAL MEDICINE

## 2019-02-14 PROCEDURE — 93306 TTE W/DOPPLER COMPLETE: CPT

## 2019-02-14 PROCEDURE — 99215 OFFICE O/P EST HI 40 MIN: CPT | Mod: S$GLB,,, | Performed by: PEDIATRICS

## 2019-02-14 PROCEDURE — 93303 ECHO TRANSTHORACIC: CPT | Mod: S$GLB,,, | Performed by: PEDIATRICS

## 2019-02-14 PROCEDURE — 93320 PR DOPPLER ECHO HEART,COMPLETE: ICD-10-PCS | Mod: S$GLB,,, | Performed by: PEDIATRICS

## 2019-02-14 PROCEDURE — 93000 ELECTROCARDIOGRAM COMPLETE: CPT | Mod: 77,S$GLB,, | Performed by: PEDIATRICS

## 2019-02-14 PROCEDURE — 99214 PR OFFICE/OUTPT VISIT, EST, LEVL IV, 30-39 MIN: ICD-10-PCS | Mod: 25,S$GLB,, | Performed by: PEDIATRICS

## 2019-02-14 PROCEDURE — 93320 DOPPLER ECHO COMPLETE: CPT | Mod: S$GLB,,, | Performed by: PEDIATRICS

## 2019-02-14 PROCEDURE — 93325 PR DOPPLER COLOR FLOW VELOCITY MAP: ICD-10-PCS | Mod: S$GLB,,, | Performed by: PEDIATRICS

## 2019-02-14 RX ORDER — ATORVASTATIN CALCIUM 20 MG/1
20 TABLET, FILM COATED ORAL DAILY
Qty: 30 TABLET | Refills: 6 | Status: SHIPPED | OUTPATIENT
Start: 2019-02-14 | End: 2019-10-28 | Stop reason: SDUPTHER

## 2019-02-14 RX ORDER — FUROSEMIDE 40 MG/1
40 TABLET ORAL DAILY
Qty: 30 TABLET | Refills: 6 | Status: SHIPPED | OUTPATIENT
Start: 2019-02-14 | End: 2019-09-30 | Stop reason: SDUPTHER

## 2019-02-14 RX ORDER — METOPROLOL SUCCINATE 25 MG/1
25 TABLET, EXTENDED RELEASE ORAL DAILY
Qty: 30 TABLET | Refills: 11 | Status: SHIPPED | OUTPATIENT
Start: 2019-02-14 | End: 2020-02-14

## 2019-02-14 RX ORDER — ENALAPRIL MALEATE 5 MG/1
7.5 TABLET ORAL DAILY
Qty: 60 TABLET | Refills: 6 | Status: SHIPPED | OUTPATIENT
Start: 2019-02-14

## 2019-02-14 RX ORDER — LEVOTHYROXINE SODIUM 150 UG/1
137 TABLET ORAL
Qty: 30 TABLET | Refills: 6 | Status: SHIPPED | OUTPATIENT
Start: 2019-02-14

## 2019-02-14 RX ORDER — UBIQUINOL 100 MG
200 CAPSULE ORAL 2 TIMES DAILY
Qty: 62 CAPSULE | Refills: 3 | COMMUNITY
Start: 2019-02-14

## 2019-02-14 RX ORDER — METOPROLOL TARTRATE 50 MG/1
50 TABLET ORAL 2 TIMES DAILY
Qty: 62 TABLET | Refills: 4 | Status: SHIPPED | OUTPATIENT
Start: 2019-02-14 | End: 2019-03-13 | Stop reason: SDUPTHER

## 2019-02-14 NOTE — PROGRESS NOTES
Thank you for referring your patient Sean Baez to the electrophysiology clinic for consultation. Please review my findings below.    CHIEF COMPLAINT: EP follow up    HISTORY OF PRESENT ILLNESS:   34 y/o female with TOF s/p repair.  She had most recent pulmonary valve replacement in 2005 and placement of epicardial dual chamber ICD with epicardial patch for sinus node dysfunction and VT. She had AICD gen change in 2011 for VT.   Seen in November 2017 by Dr. Saleh.  Still had TS but unchanged.  LV dilated and EF down to 46%.  Valve looked ok by report.  Changed rate detection to 130.   She was started on CoQ10.  History of inappropriate shock x 2 in 2005 but none since.  She was feeling very fatigued in November.  In the interim she was started on thyroid medication and CoQ10 and reports having markedly increased energy.  She had episode of sweating and really marked fatigue in August with going up steep incline and walking in sand.  She was started on thyroid medication  Sean underwent ICD generator change and pocket revision on 4/4/18 without complication.     Sean had cardiac cath in spring 2018 subsequently which demonstrated constrictive and restrictive physiology with markedly elevated LVEDP.  She had second opinion with Dr. Ny at [facility].  He agreed with medical management for now and monitor for progressive symptoms.     Steven was last seen by me in June 2018. She had undergone ICD generator change in April 2018.  Steven reports overall feeling well.  She has some foot swelling when standing for course of long day at work.  She has been trying to exercise and lose weight.  She is interested in potentially getting pregnant but concerned about risks from her cardiac conditions.    REVIEW OF SYSTEMS:     GENERAL: No fever, chills,or weight loss. See HPI  SKIN: No rashes, itching or changes in color or texture of skin.  CHEST: Denies  cyanosis, wheezing, cough and sputum production.  See HPI  CARDIOVASCULAR: see HPI  ABDOMEN: Appetite fine. No weight loss. Denies diarrhea, abdominal pain, or vomiting.  PERIPHERAL VASCULAR: No claudication or cyanosis.  MUSCULOSKELETAL: No joint stiffness or swelling.   NEUROLOGIC: No history of seizures,  alteration of gait or coordination.    PAST MEDICAL HISTORY:   Past Medical History:   Diagnosis Date    Sinus node dysfunction     TOF (tetralogy of Fallot)     V-tach            FAMILY HISTORY:   Family History   Problem Relation Age of Onset    Hypertension Mother     Hyperlipidemia Mother     Hypertension Father     Heart attacks under age 50 Maternal Grandmother     Stroke Maternal Grandmother     Stroke Maternal Grandfather     COPD Paternal Grandfather          SOCIAL HISTORY:   Social History     Socioeconomic History    Marital status: Single     Spouse name: Not on file    Number of children: Not on file    Years of education: Not on file    Highest education level: Not on file   Social Needs    Financial resource strain: Not on file    Food insecurity - worry: Not on file    Food insecurity - inability: Not on file    Transportation needs - medical: Not on file    Transportation needs - non-medical: Not on file   Occupational History    Not on file   Tobacco Use    Smoking status: Never Smoker    Smokeless tobacco: Never Used   Substance and Sexual Activity    Alcohol use: Yes     Comment: socially    Drug use: No    Sexual activity: Not on file   Other Topics Concern    Not on file   Social History Narrative    Physician at [employer].        ALLERGIES:  Review of patient's allergies indicates:  No Known Allergies    MEDICATIONS:    Current Outpatient Medications:     atorvastatin (LIPITOR) 20 MG tablet, Take 20 mg by mouth once daily., Disp: , Rfl:     coQ10, ubiquinol, 100 mg Cap, Take 200 mg by mouth 2 (two) times daily., Disp: 62 capsule, Rfl: 3    enalapril (VASOTEC) 5 MG tablet, Take 5 mg by mouth  "once daily., Disp: , Rfl:     furosemide (LASIX) 40 MG tablet, Take 40 mg by mouth once daily. , Disp: , Rfl:     levocetirizine (XYZAL) 5 MG tablet, Take 5 mg by mouth once daily. , Disp: , Rfl:     levothyroxine (SYNTHROID) 150 MCG tablet, Take 137 mcg by mouth before breakfast. , Disp: , Rfl:     metoprolol tartrate (LOPRESSOR) 50 MG tablet, Take 1 tablet (50 mg total) by mouth 2 (two) times daily., Disp: 62 tablet, Rfl: 4    montelukast (SINGULAIR) 10 mg tablet, Take 10 mg by mouth every evening., Disp: , Rfl:       PHYSICAL EXAM:   Vitals:    02/14/19 0935   BP: 117/63   Pulse: 72   SpO2: 97%   Weight: 112.4 kg (247 lb 12.8 oz)   Height: 5' 9" (1.753 m)         GENERAL: Awake, well-developed well-nourished, no apparent distress  HEENT: mucous membranes moist and pink, normocephalic atraumatic, no cranial or carotid bruits, sclera anicteric  NECK: no jugular venous distention, no lymphadenopathy  CHEST: Good air movement, clear to auscultation bilaterally  CARDIOVASCULAR: Quiet precordium, regular rate and rhythm, S1S2, no murmurs rubs or gallops  ABDOMEN: Soft, nontender nondistended, no hepatomegaly, no aortic bruits.  ICD generator incision healing well.  EXTREMITIES: Warm well perfused, 2+ radial/pedal pulses, capillary refill 2 seconds, no clubbing, cyanosis, or edema  NEURO: Alert and oriented, cooperative with exam, face symmetric, moves all extremities well    STUDIES:    ECG: Atrial paced with intact but prolonged AV conduction              T wave inversion in anterolateral and inferior leads    ICD: MDT PAWAN NAVA ICD. See Epic for complete details. ICD interrogation and lead testing performed. All data reviewed. Device and leads WNL. No arrhythmias noted.     ASSESSMENT:  32 y/o female with TOF s/p repair and tricuspid stenosis with ICD.  She is s/p ICD generator change. She had cath demonstrating constrictive physiology thought likely secondary to epicardial ICD patch.  Again discussed " optimizing medical management vs. Option of surgical removal of ICD patch.  Discussed concerns regarding pregnancy and timing.    PLAN:   EP f/u in 6 months with ECG.  Optimize diuretics and afterload reduction  Call for worsening symptoms.  Keep follow up with Dr. Saleh as planned.  Continue Metoprolol XL to 50mg BID  Call for chest pain, syncope, palpitations, or any other questions or concerns.  Review pregnancy risks with Dr. Castaneda.    Time Spent: 30 (min) with over 50% in direct patient and family consultation regarding management of ICD and arrhythmias and constrictive physiology with congenital heart disease and potential pregnancy concerns.     The patient's doctor will be notified via EPIC/FAX    I hope this brings you up-to-date on Sean Colliere  Please contact me with any questions or concerns.    Chintan Cabrera MD  Pediatric and Adult Congenital Electrophysiologist  Pediatric Cardiologist

## 2019-02-18 LAB
AV DELAY - LONGEST: 180 MSEC
IMPEDANCE RA LEAD (NATIVE): 665 OHMS
IMPEDANCE RA LEAD: 608 OHMS
OHS CV DC PP MS1: 0.4 MS
OHS CV DC PP MS2: 0.4 MS
OHS CV DC PP V1: NORMAL V
OHS CV DC PP V2: NORMAL V
P/R-WAVE RA LEAD (NATIVE): NORMAL MV
P/R-WAVE RA LEAD: 0.9 MV
PV DELAY - LONGEST: 150 MSEC
THRESHOLD MS RA LEAD (DONOR): 0.4 MS
THRESHOLD MS RA LEAD (NATIVE): 0.4 MS
THRESHOLD MS RA LEAD: 0.4 MS
THRESHOLD MS RV LEAD: 0.4 V
THRESHOLD V RA LEAD (DONOR): 0.75 V
THRESHOLD V RA LEAD (NATIVE): 0.88 V
THRESHOLD V RA LEAD: 0.88 V
THRESHOLD V RV LEAD: 1 V

## 2019-02-20 RX ORDER — ERGOCALCIFEROL 1.25 MG/1
50000 CAPSULE ORAL
Qty: 12 CAPSULE | Refills: 1 | Status: SHIPPED | OUTPATIENT
Start: 2019-02-20

## 2019-02-21 LAB
ASCENDING AORTA: 2.56 CM
AV MEAN GRADIENT: 2.54 MMHG
AV PEAK GRADIENT: 4.33 MMHG
BSA FOR ECHO PROCEDURE: 2.31 M2
CV ECHO LV RWT: 0.41 CM
DOP CALC AO PEAK VEL: 1.04 M/S
DOP CALC AO VTI: 25.12 CM
E WAVE DECELERATION TIME: 180.2 MSEC
E/A RATIO: 1.53
ECHO LV POSTERIOR WALL: 1.08 CM (ref 0.6–1.1)
FRACTIONAL SHORTENING: 25 % (ref 28–44)
INTERVENTRICULAR SEPTUM: 1.05 CM (ref 0.6–1.1)
LA MAJOR: 5.56 CM
LEFT ATRIUM SIZE: 4.54 CM
LEFT INTERNAL DIMENSION IN SYSTOLE: 3.97 CM (ref 2.1–4)
LEFT VENTRICLE DIASTOLIC VOLUME INDEX: 60.35 ML/M2
LEFT VENTRICLE DIASTOLIC VOLUME: 134.95 ML
LEFT VENTRICLE MASS INDEX: 97.2 G/M2
LEFT VENTRICLE SYSTOLIC VOLUME INDEX: 30.7 ML/M2
LEFT VENTRICLE SYSTOLIC VOLUME: 68.64 ML
LEFT VENTRICULAR INTERNAL DIMENSION IN DIASTOLE: 5.29 CM (ref 3.5–6)
LEFT VENTRICULAR MASS: 217.32 G
MV PEAK A VEL: 0.64 M/S
MV PEAK E VEL: 0.98 M/S
PISA TR MAX VEL: 2.38 M/S
PV PEAK VELOCITY: 3.05 CM/S
RA MAJOR: 5.23 CM
RA WIDTH: 4.48 CM
RIGHT VENTRICULAR END-DIASTOLIC DIMENSION: 3.19 CM
SINUS: 3.13 CM
STJ: 2.53 CM
TR MAX PG: 22.66 MMHG

## 2019-03-14 RX ORDER — METOPROLOL TARTRATE 50 MG/1
TABLET ORAL
Qty: 62 TABLET | Refills: 0 | Status: SHIPPED | OUTPATIENT
Start: 2019-03-14

## 2019-04-04 ENCOUNTER — OFFICE VISIT (OUTPATIENT)
Dept: CARDIOLOGY | Facility: CLINIC | Age: 34
End: 2019-04-04
Payer: COMMERCIAL

## 2019-04-04 ENCOUNTER — CLINICAL SUPPORT (OUTPATIENT)
Dept: CARDIOLOGY | Facility: CLINIC | Age: 34
End: 2019-04-04
Payer: COMMERCIAL

## 2019-04-04 DIAGNOSIS — Z87.74 TETRALOGY OF FALLOT S/P REPAIR: Primary | ICD-10-CM

## 2019-04-04 DIAGNOSIS — Q21.3 TOF (TETRALOGY OF FALLOT): ICD-10-CM

## 2019-04-04 DIAGNOSIS — Z87.74 TETRALOGY OF FALLOT S/P REPAIR: ICD-10-CM

## 2019-04-04 PROCEDURE — 93000 PR ELECTROCARDIOGRAM, COMPLETE: ICD-10-PCS | Mod: 59,S$GLB,, | Performed by: PEDIATRICS

## 2019-04-04 PROCEDURE — 93303 ECHO TRANSTHORACIC: CPT | Mod: S$GLB,,, | Performed by: PEDIATRICS

## 2019-04-04 PROCEDURE — 3008F BODY MASS INDEX DOCD: CPT | Mod: CPTII,S$GLB,, | Performed by: PEDIATRICS

## 2019-04-04 PROCEDURE — 93015 CV STRESS TEST SUPVJ I&R: CPT | Mod: S$GLB,,, | Performed by: PEDIATRICS

## 2019-04-04 PROCEDURE — 93325 DOPPLER ECHO COLOR FLOW MAPG: CPT | Mod: S$GLB,,, | Performed by: PEDIATRICS

## 2019-04-04 PROCEDURE — 93303 PR ECHO XTHORACIC,CONG A2M,COMPLETE: ICD-10-PCS | Mod: S$GLB,,, | Performed by: PEDIATRICS

## 2019-04-04 PROCEDURE — 93325 PR DOPPLER COLOR FLOW VELOCITY MAP: ICD-10-PCS | Mod: S$GLB,,, | Performed by: PEDIATRICS

## 2019-04-04 PROCEDURE — 93015 PR CARDIAC STRESS TST,COMPLETE: ICD-10-PCS | Mod: S$GLB,,, | Performed by: PEDIATRICS

## 2019-04-04 PROCEDURE — 93320 DOPPLER ECHO COMPLETE: CPT | Mod: S$GLB,,, | Performed by: PEDIATRICS

## 2019-04-04 PROCEDURE — 3008F PR BODY MASS INDEX (BMI) DOCUMENTED: ICD-10-PCS | Mod: CPTII,S$GLB,, | Performed by: PEDIATRICS

## 2019-04-04 PROCEDURE — 93000 ELECTROCARDIOGRAM COMPLETE: CPT | Mod: 59,S$GLB,, | Performed by: PEDIATRICS

## 2019-04-04 PROCEDURE — 99215 OFFICE O/P EST HI 40 MIN: CPT | Mod: 25,S$GLB,, | Performed by: PEDIATRICS

## 2019-04-04 PROCEDURE — 99215 PR OFFICE/OUTPT VISIT, EST, LEVL V, 40-54 MIN: ICD-10-PCS | Mod: 25,S$GLB,, | Performed by: PEDIATRICS

## 2019-04-04 PROCEDURE — 93320 PR DOPPLER ECHO HEART,COMPLETE: ICD-10-PCS | Mod: S$GLB,,, | Performed by: PEDIATRICS

## 2019-04-04 NOTE — PROGRESS NOTES
Sean Baez was seen in the Adult with Congenital Heart Disease Clinic at [facility] on April 4th, 2019.  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  She comes to clinic today for a better quality echocardiogram on a new machine, and to perform an exercise stress test.  I will summarize her rather complex course.       Sean underwent repair of tetralogy of Fallot in early childhood, and then a pulmonary valve replacement later in childhood.  In May 2005, she had a second pulmonary valve replacement with a 29 mm Mosiac porcine valve in the pulmonary position because of pulmonary insufficiency, and placement of an epicardial pacemaker for sinus node dysfunction, and placement of an automatic intracardiac defibrillator with an epicardial patch for ventricular tachycardia.  The AICD generator was changed in 2011.  We followed her for many years in this clinic with a diagnosis of right ventricular dysfunction, a well-functioning porcine pulmonary valve, ventricular tachycardia and sinus node dysfunction, pacemaker and AICD, tricuspid stenosis, mitral insufficiency, mild left ventricular dysfunction and obesity. Her rhythm was well controlled over the last several years, with only one inappropriate shock about 9 years ago requiring a pacemaker adjustment.       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  During that time she had reasonable exercise tolerance, pulmonary valve function and no arrhythmias.      At a clinic visit in  April, 2017, she was feeling well.  Physical exam showed a grade 2 to 3/6 systolic ejection murmur best heard at the left mid to upper sternal border and over the precordium, and a newly heard diastolic rumble at the left lower sternal border. The liver edge was 2 cm below the right costal margin.  Pulses were 2+ in brachial and 1+ in femoral, although it was difficult to feel her pulse is secondary to obesity.  There was no peripheral edema. An EKG showed sinus rhythm at 68 bpm, normal atrial and ventricular forces, and T-wave inversions in lead 1 V5 and V6 suggestive of ischemia (unchanged). An echocardiogram showed an aortic root of 30 mm, the left atrium was dilated at 44 mm, (no old values for comparison), the right ventricle was 34 mm which was slightly increased from 32 mm, and the fractional area change of 30%, showed mildly reduced function which had improved.  The interventricular septum measured 11 on the left ventricular posterior wall 12 mm which are at the upper limits of normal, the left ventricular internal dimension in diastole was 52 and in systole 36 mm giving him measured ejection fraction of 57%.  The ejection fraction was slightly decreased but improved since the previous value.  The pulmonary valve annulus measured 19 mm. There was slight paradoxic motion of the interventricular septum, with otherwise normal-appearing right and left ventricular function. The tricuspid valve domed in diastole.  The pulmonary valve leaflets could be seen and the pulmonary annulus of 19 mm was probably underestimated.  The right atrium was dilated at 61 x 57 mm.  The left pulmonary artery measured 12 mm and the right pulmonary artery could not be seen.  The aortic arch was left-sided and unobstructed.  The gradient across the tricuspid valve was 9 peak and 4 mean mmHg gradient indicating mild stenosis, unchanged.  There was trivial tricuspid regurgitation with a regurgitant gradient of 24 mmHg suggesting normal  right ventricular pressure.  The gradient across the porcine pulmonary valve was 22 peak and 13 mean mmHg showing trivial obstruction, and there was trivial pulmonary insufficiency.  There was mild mitral insufficiency and no residual ventricular septal defect.  An exercise stress test showed karma Harris exercised 7 minutes and 30 seconds of the Sam protocol, and then stopped due to fatigue and leg cramps.  Baseline blood pressure was 108/77 mmHg and then increased to 159/68 mmHg, which may be an underestimate, before returning to baseline.  Baseline heart rate was 68 bpm and increased to 147 bpm before returning to baseline.  There were initially T-wave inversions in lead I V5 and V6, which then reverted to upright during exercise, before becoming negative again in recovery.  There were no other ST segment changes or arrhythmias.      At that visit, I felt  that Steven was stable with her complex congenital heart disease.  Her right and left ventricular function had improved.  Her porcine pulmonary valve was functioning well with only mild stenosis and insufficiency.  She had mild tricuspid stenosis with significant right atrial dilatation, also unchanged.  She had left atrial dilatation which I felt was secondary to her mitral insufficiency and perhaps some diastolic dysfunction, also unchanged.  Per her report, her pacemaker was functioning properly and her rhythm was controlled on her current dose of metoprolol.  I requested records from her electrophysiologist in [place]. No cardiac intervention was recommended  A CBC, CMP, BNP, and lipid profile were drawn which showed a normal CBC, and normal CMP, a BNP of 82 pg/mL, an elevated total cholesterol of 220 mg/dL, a low HDL cholesterol of 36 mg/dL, normal triglycerides, and an elevated LDL cholesterol of 168 mg/dL.  A chest x-ray was obtained at that visit which showed mild cardiomegaly with normal vascularity, epicardial pacing leads on the right atrium and  right ventricle and the AICD patch at the apex.  Sean reported that she had become engaged and desired pregnancy in the future. At that visit I felt she would be able to tolerate a pregnancy.       At a clinic in November, 2017, Sean was not feeling well.  Approximately 3 months prior, while walking on the beach, she had the sudden onset of fatigue, shortness of breath, palpitations and sweating, but no chest pain.  Although she sat down and went into air-conditioning, the fatigue, shortness of breath and sweating persisted for another 30 minutes. Following that, she has noticed an increase in fatigue and exercise intolerance.  She has not participated in any exercise for that reason.  She reported these were the same symptoms as before her last valve replacement.  Sean also had a very busy life and lots of stresses.  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  She reported increased fatigue with these activities and decreased energy.  She had not had a pacemaker check in 9 months.  She was recently started on atorvastatin for elevated cholesterol, which was her only medication change.  She was taking metoprolol succinate 50 mg by mouth daily, enalapril 5 mg in the morning and 2.5 mg at night, aspirin 81 mg by mouth daily, atorvastatin 20 mg by mouth daily, Zoloft 100 mg by mouth daily, and venlafaxine 150 mg by mouth daily.  Her exam showed she had gained weight to 117 kg, a 2 kg weight increase. Her blood pressure in the right arm was 119/76 mmHg.  Pulse was 91 bpm and pulsed oximetry in room air was 98%.  First heart sound was normal and second heart sound was widely split. There was a grade 2/6 systolic ejection murmur best heard at the left upper sternal border and down the left sternal border.   A diastolic rumble was heard.  The liver was 2 cm below the right costal margin and unchanged.  The pacemaker was palpated in the upper mid abdomen.  Pulses were 2+ bilaterally except for 1+ in the right femoral.  There was no peripheral edema.     EKG showed sinus rhythm at a rate of 73 bpm with first-degree block (386 ms).  T-wave inversions in leads II and aVF, normal ventricular forces, and T wave inversions in leads V1 and V5, with T-wave flattening in V6.  Compared to the previous EKG, it was no change.     Echocardiogram showed the anatomy for tetralogy of Fallot status post complete repair and status post a pulmonary valve replacement.  It should be mentioned that the heart was difficult to visualize and images were of poor quality and attributed to obesity.  The aortic root measured 30 mm, unchanged, left atrium 35 mm, improved, and right ventricle 34 mm, unchanged.  The interventricular septum measured 11 and the left ventricular posterior wall 11 mm both of which were at the upper limits of normal.  The left ventricular internal dimension in diastole was 56 and in systole 43 mm giving a calculated ejection fraction of 46% which has decreased since the previous study.  There was flattening of septal wall motion.  The left ventricular posterior wall bowed posteriorly.  The pulmonary valve leaflets were difficult to see.  The tricuspid valve leaflets were difficult to see, but the tricuspid valve annulus was subjectively smaller than the mitral valve annulus.   Right ventricular function was judged subjectively to be mildly depressed, but the fractional area change was measured at 30%, which is within normal limits.   The branch pulmonary arteries could not be imaged.  The aortic arch was left-sided and unobstructed.  The gradient across the tricuspid valve was 10 peak and 5 mean mmHg indicating mild tricuspid stenosis, unchanged.  The gradient across the bioprosthetic pulmonary valve was 23 peak  mmHg, unchanged.  Trivial pulmonary insufficiency.  No tricuspid insufficiency.  No aortic stenosis or aortic insufficiency.  Trivial mitral insufficiency, improved.  No aortic arch obstruction.       The pacemaker was then downloaded by the [employer] representative, and it showed that she is atrially paced 6% of the time with no ventricular pacing.  She had a prolonged AV interval of 370 ms.  Her underlying rhythm was sinus bradycardia with a ventricular rate in the 40s.  She had no abnormal tachycardias greater than 150 bpm.  Her atrial and ventricular thresholds were good.  She had less than one year life on her battery.  We adjusted her pacemaker settings to have rates detected (monitor zone) greater than 130 bpm for 32 beats in duration.     At that visit,  my impression was Sean had a decrease in exercise tolerance and an increase in fatigue which was cardiac related.  She was difficult to evaluate by transthoracic echocardiography, probably secondary to obesity. I felt  her episode was secondary to an increase in tricuspid stenosis or bioprosthetic pulmonary valve dysfunction, and a decrease in left ventricular function.  Her symptoms may have been exacerbated by the stresses of finishing her residency, moving across the country, starting a new job, and planning a wedding.       Therefore, I began Sean on coenzyme Q 10 100 mg by mouth twice a day, for her left ventricular dysfunction, right ventricular dysfunction, and because she was started on a statin drug.   I presented her in cath/surgery conference for cardiac catheterization, where her tricuspid valve, pulmonary valve and coronary arteries could be evaluated, and possibly a Serina valve could be implanted and or a tricuspid valve balloon.  At the same time, I wanted her to have a transesophageal echocardiogram since her transthoracic imaging was poor.   I kept her on the same doses of her other medications, enalapril, metoprolol, atorvastatin,  and aspirin.    Laboratory work which was obtained at that visit which subsequently showed a normal CBC with the exception of a mildly elevated platelet count of 352,000, and a normal CMP.  Of note, her BNP was mildly elevated to  162 pg/mL, compared to the previous value.  A free T4 was normal at 0.9 ng/dL, but her free T3 was decreased at 1.8 pg/mL, although her TSH was normal at 1.4 to micro-units per milliliter.  A cholesterol profile was repeated now that she was on atorvastatin, and that showed the cholesterol had decreased to 179 mg/dL, and the LDL cholesterol had decreased to 123.4 mg/dL, which were now normal, although her HDL remained decreased at 37 mg/dL.  Sean then saw her internal medicine doctor who began her on thyroid replacement hormone.        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  A catheterization and possible Serina valve implantation has been scheduled for February 6, 2018.  Her wedding was March 10, 2018.  Her work schedule was rather demanding, with 7-9 days in-house as a hospitalist, followed by 7 days off.   Sean was feeling better than she was at her last clinic visit, and in particular had less fatigue.  She was still short of breath with climbing stairs, but has not had any further episodes of sudden onset of shortness of breath, weakness, sweating, palpitations or chest pain.  She had started a diet and light exercise program.     She was taking metoprolol XL 50 mg by mouth daily, enalapril 5 mg in the morning and 2.5 mg at night, aspirin 81 mg by mouth daily, coenzyme Q10 200 mg by mouth twice a day, atorvastatin 20 mg by mouth daily, Synthroid 150 µg by mouth daily, Zoloft 100 mg by mouth daily, and had weaned her Effexor down to 75 mg by mouth daily.     Exam revealed an obese but very pleasant and  otherwise healthy appearing young woman in no acute distress.  Height was 175.3 cm and weight was 116.8 kg, a 1 kg weight decrease since her last clinic visit. First heart sound was normal and second heart sound was widely split.  The was a grade 2/6 systolic ejection murmur heard best at the left upper sternal border, and faintly over the precordium.  Diastole was clear (the previous diastolic rumble had resolved).  The liver edge was 2 cm below the right costal margin and unchanged.  Pulses were 2+ bilaterally.  There was no peripheral edema.     EKG showed an atrially paced rhythm at a rate of 68 bpm with a prolonged [place] interval of 112 ms, with right ventricular conduction delay and negative T waves in the left precordial leads, unchanged.     Echocardiogram showed the anatomy for status post repair of tetralogy of Fallot and placement of a bioprosthetic valve in the pulmonary position.  The aortic root was mildly dilated at 31 mm, unchanged, the left atrium had increased to 45 mm, and the right ventricle measured 29 mm, unchanged.  The interventricular septum measured 12 and the left ventricular posterior wall 12 mm which were at the upper limits of normal.  The left ventricular internal dimension in diastole was 53 and in systole 37 mm giving a calculated ejection fraction of 58%, which had improved significantly from her previous ejection fraction of 46% 2 months ago.  The right ventricle was not seen well enough quantitate function, but subjectively appeared improved since the previous visit.  They bioprosthetic valve was poorly seen in the pulmonary position, and the leaflets were thick and moving poorly.  The right atrium was significantly dilated at 54 x 53 mm.  The tricuspid valve was not seen well enough to measure the annulus.  pulmonary arteries could not be imaged.  The aortic arch was left-sided  The gradient across the tricuspid valve was 11 peak and 5 mean millimeters of mercury indicating mild  stenosis which was unchanged.  The gradient across the right ventricular outflow tract was 27 mmHg peak.  There was mild pulmonary insufficiency. There was trivial tricuspid insufficiency which was inadequate to estimate right ventricular pressures.       Sean was evaluated by Dr. Chintan Cabrera, who felt that Sean did not have any normal tachycardia arrhythmias.  Her defibrillator, was approaching end-of-life.  Please see Dr. Cabrera's evaluation for more details.  He recommended that Sean receive monthly pacemaker/AICD evaluations to determine the best time for replacement.     My impression at that visit was Sean's left ventricular function had improved significantly since being placed on coenzyme every 10 and thyroid replacement hormone.  I believed this had led to a decrease in symptoms and an improvement in energy level.  I continued to feel, however, that Sean still required a transesophageal echocardiogram and a cardiac catheterization and possible Serina valve replacement.  I did feel, however, that this could be delayed until after her wedding, to avoid the unlikely chance of a complication that might impact her wedding.   My opinion was that waiting an additional month to perform the catheterization would not adversely affect her health.  I discussed this with Sean and her fiancé, and they were in agreement.     Sean expressed the desire to undergo pregnancy at some time in the future.  I recommended she have her cardiac catheterization and possible Serina valve implantation, and also her AICD generator change, before becoming pregnant.  I continued the same doses of her medications.   SBE prophylaxis was recommended for dental and surgical procedures.       Sean was seen again in clinic in February, 2018.  She was generally feeling well except for one episode of tachycardia, shortness of breath and nausea that occurred out while working out with her  in the morning,  and she had not yet eaten breakfast.  She had no more recurrences of these symptoms even though she has had several training sessions since then.  She otherwise felt well, and participated in all activities including exercising and working as a hospitalist, and denied chest pain, palpitations and shortness of breath and swelling.  She had postponed her cardiac catheterization and possible Serina valve placement until after the wedding.  She continued taking metoprolol 50 mg by mouth daily, enalapril 5 mg in the morning and 2.5 mg in the evening, aspirin 81 mg by mouth daily, coenzyme Q 10 100 mg by mouth twice a day, Synthroid 150 µg by mouth daily, and Zoloft 100 mg by mouth daily.  She has been weaned off her Effexor.     Physical exam revealed an obese, pleasant and healthy-appearing woman in no acute distress.  Vital signs showed a height of 175.3 cm or 5 feet 9 inches and a weight of 119 kg or 262 lbs. 7 oz., which is a weight increase of 2.2 kg since her last clinic visit.  Blood pressure in the right arm was 107/73 mmHg, pulse 87 bpm, and pulsed oximetry in room air 98%. Examination of the skin showed it to be pink and well perfused.  The lungs were clear.  Palpation of the precordium showed it to be normal with a well-healed midline scar, and a pacemaker palpated in the abdomen.  The liver edge was 2 cm below the right costal margin, unchanged.  Pulses were 2+ bilaterally.  There was no peripheral edema.     EKG was obtained which showed a paced atrial rhythm at 78 bpm, with right ventricular conduction delay, and T-wave inversions in the limband precordial leads suggestive of ischemia, unchanged.     Echocardiogram showed evidence for repair of tetralogy of Fallot status post pulmonary valve replacement.  Two-dimensional imaging was poor.  M-mode parameters were as follows: The aortic root measured 30 mm, left atrium 43 mm, right ventricle 26 mm, interventricular septum 12 and left ventricular posterior  wall 12 mm, the left ventricular internal dimension in diastole was 56 and in systole 36 mm giving a calculated ejection fraction of 55% and these numbers are normal.  The right ventricle function was judged subjectively to be reduced but unable to be quantitated The bioprosthetic valve in the pulmonary position was difficult to see, however thickened leaflets with poor movement were observed.  The aortic arch was left-sided and unobstructed.  The superior vena cava drains normally to the right atrium.  Doppler analysis using pulsed continuous-wave and color-flow:  The there was turbulence across the tricuspid valve with a gradient of 14 peak and 10 mean millimeters of mercury indicating mild tricuspid valve stenosis which was unchanged.  Mitral insufficiency was present which was mild.  The gradient across the bioprosthetic pulmonary valve was 32 mmHg peak in the setting of reduced right ventricular function.  No residual ventriculoseptal defect.  No aortic stenosis or aortic insufficiency. Impression: Status post repair of tetralogy of Fallot.  Poor two-dimensional imaging.  Decreased right ventricular function.  Bioprosthetic pulmonary valve with thickened and poorly moving leaflets, and a 32 mmHg peak gradient across it in the setting of reduced right ventricular function.  Mild tricuspid valve stenosis with a 14 peak and 10 mean millimeter gradient across it, unchanged.     Sean was then evaluated by electrophysiologist Dr. Chintan Cabrera, performed a download of her pacemaker, and that showed that there was a 6 beat run of ventricular tachycardia which resolved spontaneously, and that the pacemaker generator close to end-of-life.     My impression from that visit was that Sean had right ventricular dysfunction and pulmonary valve dysfunction, in particular pulmonary valve stenosis.   I felt that she required a pulmonary valve replacement which hopefully could be performed with a Serina valve.  I  believed her right ventricular function would improve after that.  She also has ventricular tachycardia, but it was self-limited and she was protected by both her metoprolol and her AICD.  The generator also required replacement.  Dr. Cabrera has recommended the metoprolol be increased to 50 mg by mouth twice a day.   Following this clinic visit, Sean called with some leg swelling, and was placed on Lasix 20 mg by mouth daily, in addition to her other medications.     Sean had an uneventful wedding and honeymoon.  She underwent replacement of her ICD generator by Dr. Chintan Cabrera on April 4, 2018.  An ICD EVERA MRI S  [id number]: Serial No. [id number] was placed in the abdominal pocket and the previous generator removed.  She tolerated the procedure well.  Lead testing revealed:      RV Lead:       Threshold: 1.3 V / 0.4 ms       Impedance: 646 ohms       R wave: 5.9 mV  RA Lead:       Threshold: 1.3 V / 0.4 ms       Impedance: 589 ohms       P wave: .5 mV     Sean underwent transesophageal echocardiogram and cardiac catheterization under general anesthesia on April 26, 2018.  Transesophageal echocardiogram showed:     Mild right ventricular dilatation.  Paradoxic motion of the interventricular septum.    Normal left ventricle structure and size, with normal left ventricular posterior wall motion.  Normal to mildly decreased right ventricular free wall motion.  No pericardial effusion with normal-appearing pericardium.  No atrial or ventricular shunts.  Tethering of the tricuspid valve septal leaflet, with mild doming mild tricuspid valve prolapse.  Normal tricuspid valve velocity, and mild tricuspid valve insufficiency, with a 29 mmHg gradient.    Immobile and echodense posterior leaflet of the prosthetic pulmonary valve, with a peak gradient of 17 mmHg and mild pulmonary insufficiency.     Catheterization showed:     Saturations: SVC 69%, RA 72%, right pulmonary artery 69%, left pulmonary artery 69%,  "aortic 99%.    Pressures in mmHg:  RA mean 19, RV 50/19, RPA  40/14 mean  25, LPA 43/16 mean 26, RPCWP mean 20, LPCWP mean 20, LV 101/18, ascending aorta 101/59  mean  77, and descending aorta 99/58 mean 75.    Calculations:  Using a hemoglobin of 11.3,  the Qp equaled to Qs at 2.55 L/min/m2, which gave a pulmonary vascular resistance of 2.36 Wood units/m2.  The raw cardiac output was 5.73 liters per minute.     Coronary angiography was then performed which showed normal left and right coronary arteries.  No other angiography was performed.  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     Sean's postprocedure follow-up clinic visit was on May 1, 2018.  Since her discharge from the hospital several days prior, she had been feeling generally well.  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  She had in general noticed increasing shortness of breath with walking compared to 6 months ago.  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  Her swelling had improved after starting the Lasix, she only noticed ankle swelling when on her feet for several hours.  She also noticed an improvement in palpitations after increasing her metoprolol to 50 mg by mouth twice a day, however she continued to experience palpitations every night when lying down, which lasted for a few seconds.  She had occasional "tingling" in her left upper chest which lasted for several seconds and occurred once a week, but no chest pain.  She had not exercised in over one month.  She has continued to gain weight.     Sean was taking Lasix 20 mg by mouth daily added about 2 months prior, coenzyme every 10 100 mg by mouth twice a day, enalapril 5 mg in the morning and 2.5 mg at night, metoprolol tartrate 50 mg by mouth twice a day, Synthroid, and atorvastatin. "  She was no longer taking aspirin or Zoloft.     Physical exam revealed a pleasant, obese and healthy-appearing young woman in no acute distress.  Vital signs showed a height of 175.3 cm or 5 feet 9 inches, and a weight of 121.3 kg or 267 lbs. 6 oz., which is an increase of 2 kg from her last clinic visit.  Blood pressure was 105/64 mmHg and heart rate 65 bpm.  Pulsed oximetry was not obtained     Examination of the skin showed it to be pink and well perfused area the lungs were clear.  Palpation of the precordium showed it to be normal with a well-healed midline scar.  First heart sound was normal and second heart sound was widely split.  There was a grade 2/6 systolic ejection murmur fairly well localized to the left upper sternal border.  I no longer head the diastolic rumble.  The liver edge was 2 cm below the right costal margin.  Pulses were 2+ bilaterally.  The wounds were healed.  There was no peripheral edema.     EKG showed sinus rhythm at 64 bpm, with first-degree block and a OR interval of 266 ms, right ventricular hypertrophy, and T-wave inversions in the inferior and precordial leads. This EKG had not changed since the previous one, however the T-wave in lead V6 has become inverted in the last year.     I reviewed the cardiac catheterization, pressure tracings and transesophageal echocardiogram with Sean and her .  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  I felt that this was the primary cause of her symptoms of exercise intolerance, fatigue and peripheral edema.  The cause of this was perplexing. It was possible that her AICD patch was contributing to diastolic dysfunction, and I felt she should also be worked up for pericardial and myocardial infiltrative diseases.  I continued to feel, however, that she would still benefit from a new pulmonary valve, since one of the valve leaflets  was frozen, and the valve had been in for 13 years.  I believed that the pulmonary valve stenosis, insufficiency and tricuspid valve stenosis was a minor contributor to her right-sided dysfunction and symptoms.  For now, I felt she should be medically managed.  Sean actually looked quite well at that visit, and therefore was continued on her current medical management.  I also felt that her persistent weight gain and obesity were contributing to exercise intolerance.  Sean was also complaining of palpitations which needed to be characterized.     Therefore, Sean was kept on the same doses of her Lasix, coenzyme Q 10, enalapril, Synthroid, and atorvastatin.  We placed a 48 hour Holter monitor as surveillance for arrhythmias.  She was advised to slowly begin an exercise program and continue to lose weight with diet.  Laboratory work including an GWEN, rheumatoid factor, complement, sedimentation rate, high specificity CRP, CBC, reticulocyte count, BNP, and CMP.  These results subsequently showed: CBC was within normal limits; the CMP was normal with the exception of a bilirubin of 2.0 mg/dL and a bicarbonate of 22 mmol per liter; the BNP was elevated at 209 pg/mL; the high sensitivity CRP was elevated at 4.72 mg/L; the TSH was 0.037 micro international units per milliliter; the sedimentation rate was elevated at 46 mm/h; the C3 complement was elevated at 185 mg/dL.  The free T4, C4 complement, rheumatoid factor, GWEN and reticulocytes were all normal.  These results indicated an inflammatory process.  Sean was then referred to a rheumatologist to see if an inflammatory disease was contributing to her constrictive pericarditis.  She did not obtain that consultation. Sean's  Holter monitor subsequently showed sinus rhythm at rates 59 - 210 bpm with a heart average heart rate of 64 bpm, frequent premature atrial beats, atrial bigeminy and occasional atrial couplets, with a total of 1766 premature atrial  beats in 24 hours, and rare PVCs.  The increase in atrial ectopy was new.     Sean also was evaluated by Dr. Thad Ny at [facility] adult congenital heart disease program in [place].  Dr. Ny agreed that Sean had developed a constrictive pericarditis, and he felt it might be secondary to the AICD patch over the cardiac apex, and possibly intrinsic restrictive physiology from her tetralogy of Fallot.  He did not think a Serina valve placement was indicated at that time.  He felt that her tricuspid stenosis may have been secondary to her initial surgical repair.  He felt that her obesity was contributing to her dyspnea, and suggested a possible sleep study.  He felt that she probably would be able to tolerate a pregnancy in the future, but would be in a high risk category.  He recommended that her diuresis be increased and that her Lasix be increased to 40 mg by mouth daily.     Sean was then seen in May, 2018.  Since her last clinic visit 6 weeks prior, Sean was feeling better.  She had begun an exercise program with a , did primarily weight lifting but also walked on a treadmill for about 5 minutes.  She reported less shortness of breath with exercise.  Her energy level had improved since her last clinic visit.  Her previous complaints of palpitations had resolved. She was now active working 7 days at a time.  She was dieting and has lost about 12 pounds.     Sean was currently taking Lasix which had been increased to 40 mg by mouth daily and to which she has a good diuretic response, coenzyme every 10 100 mg by mouth twice a day, metoprolol tartrate 50 mg by mouth twice a day, enalapril 5 mg in the morning and 2.5 mg at night, Synthroid which had been decreased to 137 µg daily, Singulair 10 mg by mouth daily, levo cetirizine 5 mg by mouth daily, fish oil and multivitamins.     Physical exam revealed a pleasant, healthy-appearing obese young woman in no acute distress.  Vital  signs showed a height of 175.3 cm or 5 feet 9 inches, and a weight of 116.1 kg or 255 lbs. 15 oz., which was a 5 kg weight decrease since her last clinic visit.  Blood pressure in the right arm was 107/71 mmHg, pulse 84 bpm and pulsed oximetry in room air 95%.     Examination of the skin showed it to be pink and well perfused.  The lungs are clear.  Palpation of the precordium showed it to be normal with a well-healed midline scar.  First heart sound was normal and second heart sound widely split.  The was a grade 2/6 systolic ejection murmur best heard at the left upper sternal border but also over the precordium. Diastole was clear.  The liver edge was 2 cm below the right costal margin.  Pulses were 2+ bilaterally.  There was no peripheral edema.     EKG was obtained which showed an atrially paced rhythm at 70 bpm with an AV conduction time of 360 ms, right ventricular hypertrophy, and T-wave inversions in leads I, II, V5 and V6 and T-wave flattening in leads aVF indicating ischemia, with a QTc interval of 441.  Compared to the previous study there was a negative T-wave in lead I.     An echocardiogram was obtained which showed status post repair of tetralogy of Fallot.  Two-dimensional imaging was poor.  M-mode parameters were as follows: The aortic root measured 30 mm unchanged, left atrium was significantly dilated at 46 mm increased, right ventricle 20 mm by M-mode and 35 mm in the long axis view, the interventricular septum measured 10 and the left ventricular posterior wall 10 mm which were both normal.  The left ventricular internal dimension in diastole was 54 and in systole 41 mm giving a calculated ejection fraction of 47%.  The left ventricular ejection fraction has decreased since the previous study (56%).  There was no pericardial effusion.  There was decreased excursion of the septal leaflet of the tricuspid valve leading to inadequate tricuspid valve opening in diastole.  The pulmonary valve was  echo dense and the posterior leaflet had decreased motion although the valve was poorly seen in general.  The ventriculoseptal defect patch was in proper position.  The right atrium was dilated and measured 47 x 46 mm. There was subjectively normal right ventricular function with a fractional area change of 47%.  There was poor subcostal imaging.  The aortic arch was left-sided and unobstructed. The right superior vena cava drained normally to the right atrium.  Doppler analysis using pulsed, continuous wave and color-flow: Tricuspid stenosis was present with a 12 peak and 5 mean millimeter gradient across it indicating mild + tricuspid stenosis which was unchanged.  The gradient across the pulmonary valve was 25 mmHg peak indicating mild obstruction and there was mild to moderate pulmonary insufficiency.  No tricuspid insufficiency.  Trivial mitral insufficiency.  No aortic stenosis, aortic insufficiency, or residual ventriculoseptal defect.  Impression: Status post repair of tetralogy of Fallot with bioprosthetic valve in the pulmonary position.  Echodense pulmonary valve with decreased motion of the posterior leaflet, but only a 25 mmHg peak gradient across it indicating mild obstruction in the presence of normal right ventricular function, with mild to moderate pulmonary insufficiency.  Moderate left ventricular dysfunction which has increased.  Normal right ventricular function.  Dilated left atrium which has increased.  Dilated right atrium.  Tricuspid stenosis with decreased excursion of the septal leaflet.     An exercise stress test was performed which showed that [place] exercise 6 minutes and 49 seconds of the Sam protocol, indicating poor exercise tolerance for age.  Baseline heart rate was 63 bpm, increased to 137 bpm at peak exercise before decreasing back to 71 bpm 9 minutes into recovery.  This was a suboptimal peak heart rate which was probably secondary to beta-blockade.  Baseline blood pressure  was 98/63 mmHg in the right arm, increased to 117/65 mmHg 1 minute into recovery, before decreasing back to 103/51 mmHg 7 minutes into recovery.  This was a blunted pressure response to exercise and may have been due to beta-blockade and ACE inhibition.  Baseline saturation was 97% and decreased to 87% at peak exercise, before increasing back to 98% 1 minute into recovery.  There were desaturations at peak exercise that probably represented intrapulmonary shunting.  Sinus rhythm with frequent PACs and occasional atrial couplets were present starting in stage II, which then resolved at peak exercise.  Occasional PACs were then seen again in recovery.   There were no ST segment depressions throughout exercise, in fact, the negative T-wave in leads 2 and V5 improved during exercise.  It should be noted that the patient was examined after exercise and the lungs were clear.  Impression: Suboptimal level of exercise for age, with blunted heart rate response and blood pressure response.  Moderate desaturations with exercise to 87% which then resolved.  Premature atrial complexes with atrial couplets increase in stage II and then are suppressed. Improvement in T-wave inversions throughout exercise.     Sean was also evaluated by Dr. Chintan Cabrera who did not detect any arrhythmias on her newly placed generator download, and did not make any recommendations in pacemaker or rhythm management.  He asked to see her again in 6 months.     My impression from that visit was Sean had improved since her last clinic visit.  She had improved symptoms of exercise intolerance, improved energy level, improved activity, and her palpitations had resolved. She was now exercising regularly and losing weight.  She still had poor exercise tolerance, however, and desaturated with exercise, which I suspected was intrapulmonary shunting.  I did not detect pulmonary edema on exam during exercise.  She had a progression in left ventricular  dysfunction for which I found no etiology.  She continued to have mild pulmonary valve stenosis with mild to moderate pulmonary insufficiency of her bioprosthetic valve.  Her right ventricular function had improved. She also had mild tricuspid valve stenosis, and moderate right atrial and left atrial dilatation which had increased.  Her recent Holter monitor has showed no ventricular ectopy, rather PACs, atrial couplets and triplets.  I felt that Sean's cardiac condition had improved with increasing diuresis and increasing exercise capacity and also weight loss.  I continued to be concerned by her diagnosis of constrictive pericarditis, her left ventricular dysfunction, and her intrapulmonary shunting.  I also felt that weight lifting was not the best exercise for her, and asked her to switch more to cardio training and less weight lifting.     Sean was instructed to increase her coenzyme Q10 to 200 mg by mouth twice a day, and stay on the same doses of her other medications.  Laboratory studies were ordered including a CBC, CMP, BNP, complement C3, CRP and a sedimentation rate, but were not obtained.   She was given a return to this clinic in 3 months time with an EKG and an echocardiogram.  SBE prophylaxis continued to be in order for all dental and surgical procedures.     Sean  was seen on October 4, 2018.  Since her previous clinic visit 4 months prior, she continued to improve and felt well.  She has continue to diet and lost another 14 lb in 4 months, in addition to the 12 lb she had lost at her last clinic visit.  She is exercising regularly which includes Pilates, ballet bar, light weight training, and cardio roping machine, in addition to once a week with a .  She denied shortness of breath, chest pain, palpitations or swelling.  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  She was taking her medicine regularly.  She continues to have a waqar IUD, which  she was planning on removing in March and then attempting pregnancy.  Her previous pacemaker download was October 3, 2018, the results of which were not yet available.     Sean was taking enalapril 5 mg in the morning and 2.5 mg in the evening, coenzyme Q10 200 mg p.o. b.i.d., metoprolol tartrate 50 mg p.o. b.i.d., Lipitor 20 mg p.o. q.day, Lasix 40 mg p.o. q.day to which she has a good diuretic response, levo cetirizine 5 mg p.o. q.day, Synthroid 137 mcg p.o. q.day, and Singulair 10 mg p.o. q.day.     Physical exam revealed a healthy-appearing and pleasant young woman in no acute distress.  Vital signs showed a height 175.3 cm or 5 ft 9 in, and weight of 109.6 kg or 241 lb 12 oz, which is a 14 lb weight decrease since her previous clinic visit in May, 2018.  Pulse was 86 beats per minute, pulse oximetry in room air 97%, and blood pressure in the right arm 107/68 mm of mercury.     Examination of the skin showed it to be pink and well perfused.  The lungs were clear.  Palpation of the precordium showed to be normal with a well-healed midline scar.  First heart sound was normal and 2nd heart sound was widely split.  There was a grade 2/6 systolic ejection murmur best heard at the left upper sternal border, but well over the precordium.  Diastole was clear.  The liver edge was at the right costal margin and improved.  Pulses were 2+ bilaterally. There was no peripheral edema.  The pacemaker was palpated in the abdomen.     EKG was obtained which showed a paced atrial rhythm at 68 beats per minute, right ventricular conduction delay, and T-wave inversions in leads I,II, AVF, V1 - V6, unchanged.     An echocardiogram was obtained which showed evidence for repair of tetralogy of Fallot, and a bioprosthetic valve in the pulmonary position.  M-mode parameters were as follows:  The aortic root measured 30 mm unchanged, left atrium 44 mm decreased slightly (46 mm), right ventricle 28 mm by m mode and 35 mm by  2D in  long axis, unchanged.  The interventricular septum measured 10 and left ventricular posterior wall 11 mm, both normal.  The left ventricular internal dimension in diastole was 52 and systole 36 mm giving a calculated ejection fraction of 59%, he which has improved and is now normal (previously 47%).  The pulmonary valve leaflets were thickened with decreased mobility, with the posterior leaflet being frozen, as noted previously.  The pulmonary valve annulus was small at 16 mm.  The right atrium was dilated measuring 56 x 47 mm which had increased.  There was decreased opening of the tricuspid valve with tethering of the septal leaflet.  Both right and left ventricular function were judged subjectively to be within normal limits.  The ventricular septal defect patch was in proper position.  Doppler analysis using pulse, continuous and color-flow:  No tricuspid insufficiency.  Peak gradient across the tricuspid valve was 11 peak and 6 mean mm of mercury, unchanged.  Peak gradient across the bioprosthetic pulmonary valve was 30 peak and 18 mean mm of mercury, slightly increased parentheses 25 mm of mercury peak).  Pulmonary insufficiency was present which was mild and improved.  No mitral insufficiency.  No aortic stenosis or aortic insufficiency.  The gradient across the mitral valve was 2 peak and 1 mean mmHg with E wave dominance suggestive of constrictive pericarditis.  Impression:  Tetralogy of Fallot status post placement of a bioprosthetic valve in the pulmonary position.  Normal right ventricular size and function. Normal left ventricular size and function which has returned to normal. Hypoplastic pulmonary annulus measuring 16 mm, with thickened leaflets and frozen posterior leaflet, with a 30 mm Hg peak gradient suggesting mild obstruction which has increased slightly, and mild pulmonary insufficiency which has improved.  Dilated right atrium which has increased to 56 x 47 mm, and dilated left atrium which is  stable to slightly improved at 44 mm.  Decreased excursion of the septal leaflet of the tricuspid valve leading to reduced flow and an 11 peak and 6 mean mmHg gradient, unchanged.  No gradient across mitral valve but E wave dominance, consistent with constrictive pericarditis.       An exercise stress test was obtained which showed that Sean exercised 7 min and 18 sec of the Sam protocol, 1 min and 18 sec into stage 3, which was below predicted for an adult, but improved from the previous study by about 30 sec.  Baseline heart rate was 63 beats per minute, increased to 159 beats per minute at peak exercise, before decreasing back to 75 beats per minute at the end of 7 min of recovery. Baseline blood pressure was 115/64 mm of mercury, increased to 140/77 mm of mercury 1 min after peak exercise, before decreasing back to 108/65 mm of mercury 7 min into recovery.  Saturation was 98% at baseline, and then decreased to 94% at peak exercise, before returning to 99% 1 min into recovery.  Patient remained fully saturated throughout the remainder of the study.  Baseline rhythm was sinus at 63 beats per minute, and patient continued in sinus rhythm throughout exercise and recovery.  Occasional unifocal PVCs were seen starting in stage II and III, which resolved in recovery.  There were no PACs.  Baseline EKG had negative T-waves in leads 1, 2, AVF, and V1 through V6.  During exercise, T-waves became flattened in leads 1 and 2 and less negative in leads AVF, V1 through V6.  At peak exercise, T-waves were upright in leads 1, and 2, flattened in AVF, and upright in V1 through V6.  During recovery, T-waves again became negative in leads 1, 2, and AVF, V1 through V6.  Impression:  Low level of exercise predicted for age, however an improvement from the previous study of 30 sec.  Normal blood pressure and her heart rate response to exercise.  Mild desaturations to 94% at peak exercise, which recovers within 1 min to 99%.   Occasional unifocal PVCs during exercise which resolved in recovery, and no PACs, which is an improvement from the previous study.  Inverted T-waves in inferior and precordial leads which convert to positive during exercise, and then revert back to negative during recovery.     My impression from this visit was that Sean had improvement in cardiac function, rhythm, energy, and exercise tolerance.  I attributed this to an improvement in left ventricular function, improvement in right ventricular function, a decrease in weight, an increase in exercise training, and an improvement in arrhythmias.  She still has evidence for a thickened and poorly functioning bioprosthetic valve, however the gradient across it was only 30 mm of mercury peak, with mild pulmonary insufficiency, and her right ventricular function had improved to normal. I still felt that she would need a Serina valve placement in the near future.  She still had evidence for constrictive pericarditis as evidence by her dilated right and left atria, and Doppler flow pattern in the left ventricle, however, I saw an overall improvement in cardiac function leading to decreased symptoms.     Sean's reasons for improvement were probably multifactorial, and I felt no reason to change any of her management.  Therefore, Sean  was instructed to continue on the same doses of her medications.  She was to continue her successful diet and exercise program.  I contacted Dr. Cabrera to ask for the results of her recent pacemaker download.  I suspected that she and her fetus would tolerate a pregnancy in the future.  Sean was instructed to return to this clinic in about 3 months time, at which point she would have a dual appointment with electrophysiologist Dr. Cabrera and myself, with an EKG and an echocardiogram.  Laboratory work was ordered including CBC, CMP, BNP, thyroid function studies and lipid profile.  SBE prophylaxis was in order for dental and  surgical procedures.          Laboratory work obtained on October 4th, 2018 showed a T4 normal at 9.6 mcg/dL, T3 at the lower limit normal 2.3 pg/mL, TSH at the lower limit of normal at 0.414 micro international units per mL, cholesterol was 119 mg/dL, triglycerides 59 mg per dL, HDL was low at 34 mg/dL, LDL 73.2 mg/dL.  C3 complement was now normal at 145 mg/dL, high sensitivity CRP was now normal at 2 point 7 5 mg per dL, and BNP was elevated at 170 8 pg/mL which had improved.  The CMP was normal except for an elevated total bilirubin of 1.8 mg/dL which had improved.  The CBC was normal with a hemoglobin of 12.4 grams/deciliter and hematocrit 38.5%.     Sean was seen in the [facility] on February 14, 2019.  Since her clinic visit 4 months prior, or near has been feeling well.  She is exercising, which consists doing some classes, rides a bicycle for 30 min, and works with a , and her exercise capacity has improved.  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  She reports a good energy level which has improved.  She denies chest pain, shortness of breath or palpitations.  She does notice swelling of her lower legs when working her 12 hr shifts, during which time she is on her feet quite a bit.     Sean is taking Lasix 40 mg p.o. q.day, to which she has a good diuretic response, metoprolol tartrate 50 mg p.o. b.i.d., enalapril 5 mg in the morning and 2.5 mg in the evening, coenzyme Q10 200 mg p.o. b.i.d., Synthroid 137 mcg p.o. q.day, Lipitor 20 mg p.o. q.day, cetirizine 10 mg p.o. q.day, fish oil and a multivitamin.  She has stopped taking Singulair.     Physical exam revealed an obese, pleasant and healthy-appearing young woman in no acute distress.  Vital signs showed a height of 5 ft 9 in or 175.3 cm, and weight of 112.4 kg or 247 lb 13 oz, which is a 3 kg weight increase since her previous clinic visit.  Blood pressure  was 117/63 mm of mercury in the right arm, pulsed oximetry in room air 97%, and pulse was 71 beats per min.     Examination of the skin showed it to be pink and well perfused.  The lungs were clear.  Palpation of the precordium showed to be normal with a well-healed midline scar.  First heart sound was normal and 2nd heart sound was split.  There was a grade 2-3/6 systolic ejection murmur heard best at the left upper sternal border with minimal radiation.  There was also a grade 2/6 diastolic murmur heard at the left lower sternal border, and also probably a diastolic rumble.  The liver edge was 2 cm below the right costal margin, unchanged.  The pacemaker was palpated in the upper mid abdomen.  Pulses were 2+ bilaterally.  There was no peripheral edema.     An EKG was obtained which showed an atrially paced rhythm at 70 beats per minute, with a long PVR interval of 344 milliseconds.  There was an RV conduction delay, and negative T-waves in leads 1, 2, V5 and V6 which were unchanged.  Compared to the previous study, the patient is now atrially paced.     An echocardiogram was obtained which had very poor two-dimensional imaging, secondary to a different machine being use, and patient obesity.  Chamber sizes were made on 2 dimensional imaging as M-mode measurements were suboptimal.  The interventricular septum measured 10 and the left ventricular posterior wall 11 mm, no change.  The right ventricular dimension in the long axis view was 32 mm, improved (35 mm).  The left ventricular internal dimension diastole was 53 and in systole 40 mm giving a calculated ejection fraction of 49%, showing mild LV dysfunction which has decreased (previously 59%).  The left atrium was dilated measuring 45 mm, unchanged.  The aortic root measured 31 mm, unchanged.  The ascending aorta measured 25 mm.  The pulmonary valve was difficult to image, and the posterior leaflet was thickened and had decreased motion, and the pulmonary valve  annulus was 22 mm.  In the four-chamber view, the left atrium measured 56 x 52 mm.  The right atrium measured 52 x 45 mm, unchanged.  The tricuspid valve was poorly seen.  Subcostal views could not be obtained.  The aortic arch was left-sided and unobstructed.  Doppler analysis using pulse, continuous and color-flow:  Mitral insufficiency was present which was mild, a new finding.  Pulmonary insufficiency was present which was mild with a end-diastolic gradient of 4 mm of mercury, unchanged.  The gradient across the bioprosthetic pulmonary valve was 37 mm of mercury peak, which has increased since the previous value (30 mm of mercury).  The gradient across the tricuspid valve was 13 peak and 6 mean mm of mercury, similar to previous value.  The E to a ratio across the mitral valve was 1.52.  Tricuspid insufficiency was present which was trivial.  No aortic stenosis or aortic insufficiency.  Impression:  Very poor imaging probably secondary to technical difficulties as well as patient obesity, leading to difficulty imaging and measuring all cardiac structures.  Tetralogy of Fallot status post pulmonary valve replacement.  Normal size right ventricle which has increased since the previous study, with subjectively normal right ventricular function.  Mildly decreased left ventricular function with ejection fraction 49%.  Significantly dilated left atrium and right atrium, unchanged.  Decreased motion and thickening of the posterior leaflet of the bioprosthetic pulmonary valve, with a 37 mm Hg gradient which has increased.  New mild mitral insufficiency.  Mild pulmonary insufficiency.  Mild to moderate tricuspid stenosis, unchanged.       Laboratory values were obtained which showed a normal CBC with a hemoglobin of 13 grams/deciliter and a hematocrit of 40%.  The free T4 was normal at 1.4, and the CMP was normal except for a mildly elevated bilirubin of 1.8, unchanged.  The BNP was mildly elevated at 156 peak a g per  mL, unchanged.  The high density CRP was normal at 2.24.  The free T3, TSH, vitamin-D and vitamin-B levels were pending.     Sean was also evaluated by electrophysiologist Dr. Chintan Cabrera.  Dr. Cabrera found that there were no new arrhythmias and the pacemaker was functioning well.  He did not recommend any changes.  Please see Dr. Cabrera note for more details.     My impression from this visit was that Sean may have had a decrease in left ventricular function the etiology of which is not evident, but the quality of the echocardiogram was so poor that I feel this needs to be repeated on a better quality machine.  Her right ventricular size and function are normal.  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  Her bioprosthetic pulmonary valve gradient has increased from 30-37 mm of mercury peak, and continues to have a posterior leaflet which moves poorly.  The previous inflammatory process that occurred last year has resolved, as evidenced by a return of her CRP to normal.  I wondered whether this participated in the development of her constrictive pericarditis, although she has another reason to have this from her epicardial AICD patch.     Sean desires to become pregnant within the next few months.  In terms of tolerating her pregnancy, her biggest risk factor is the constrictive pericarditis.  I still feel that the stenotic bioprosthetic pulmonary valve is contributing to her cardiac dysfunction, and that she should have a Serina valve placed prior to pregnancy.  I feel she will be at moderate risk for pregnancy but should be able to tolerated with careful monitoring from the Cardiology and Maternal-Fetal services.     Despite these issues, Sean continues to improve clinically, as evidenced by improved energy and exercise tolerance.  I am attributing this to her weight loss and exercise training.  Therefore,  Sean was instructed to stay on the same doses of her medications.  I have asked her to return to this clinic in 1 months time with an EKG, an echocardiogram on a better quality machine, and an exercise stress test.  Following that I will present her in conference for Serina valve placement prior to pregnancy.  I will also arrange for her to have a maternal fetal evaluation in the future.    Sean Baez was seen in the [facility] on April 4, 2019.  She is coming in today because the echocardiogram performed at her previous visit was inadequate, and another study is scheduled on a new machine.  Additionally, she is being assessed for suitability of pregnancy.  Additionally, an exercise stress test is scheduled for this visit.  Since her previous clinic visit in February, 2019, or near states that she has been feeling well.  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  She continues to exercise with a , does aerobic exercise for almost an hour and does some mild weight training.  She denies chest pain, palpitations, shortness of breath, swelling or dizziness.  Her father was recently diagnosed with diabetes mellitus.    Sean is currently taking metoprolol 50 mg p.o. b.i.d., enalapril 5 mg in the morning and 2.5 mg in the evening, Lasix 40 mg p.o. q.day, Synthroid 137 mcg p.o. q.day, BuSpar 10 mg p.o. b.i.d., fish oil, multivitamin, coenzyme Q10 200 mg p.o. b.i.d., cetirizine 10 mg PO q.day, atorvastatin 20 mg PO q.day, and vitamin D 51864 units p.o. q.week.    Physical exam revealed an obese, pleasant young woman in no acute distress.  Vital signs showed a height of 175.3 cm or 5 ft 9 in, and weight of 112.8 kg or 248 lb 9 oz, unchanged.  Blood pressure in the right arm was 117/78 mm of mercury, pulse oximetry in room air 99% and pulse 95 beats per  min.    Examination of the skin showed it to be pink and well perfused.  The lungs were clear.  Palpation of the precordium showed to be normal with a well-healed midline scar.  First heart sound was normal and 2nd heart sound was widely split.  There was a grade 3/6 harsh systolic ejection murmur heard best at the left upper sternal border, and well over the precordium.  There was also a 1/6 diastolic murmur heard along the left lower sternal border.  The liver edge was at the right costal margin, improved and the pacemaker was palpated in the abdomen.  Pulses were 2+ bilaterally.  There was no peripheral edema.    An EKG was obtained which showed sinus rhythm with first-degree block at 80 beats per minute, right ventricular hypertrophy, and T-wave inversions in leads 1 2 and V1 suggestive of ischemia, no change.    An echocardiogram was obtained which showed tetralogy of Fallot status post repair with a bioprosthetic valve in the pulmonary position.  M-mode parameters were as follows:  The aortic root measured 32 mm (Z 0.9) unchanged and the left atrium measured 44 mm (Z 0.9) unchanged.  The aortic annulus measured 23 mm (Z 1.3).  The interventricular septum measured 10 mm (Z-0.5) and the left ventricular posterior wall 10 mm (Z-0.2), both unchanged.  The left ventricular internal dimension diastole measured 53 mm and in systole 38 mm, giving a calculated ejection fraction of 54%.  The ejection fraction was borderline low but has improved since the previous study (LV EF 49%).  The right ventricle measured 35 mm (Z 0.6) increased from the previous study (32 mm).  The tricuspid valve domed in diastole with what appear to be septal leaflet for shortening.  The tricuspid valve annulus measured 28 mm and the mitral valve annulus 34 mm.  A bioprosthetic valve was in the pulmonary position with the posterior leaflet frozen and the anterior leaflet moving.  The right pulmonary artery measured 12 mm proximally (Z-1.4) and  18 mm distally.  The left pulmonary artery measured 15 mm proximally (Z-0.6) and 20 mm distally.  Right ventricular function was judged subjectively to be normal.  The right atrium was dilated and measured 57 x 52 mm which is increased since the previous study (52 x 45 mm).  The atrial septum is intact.  The inferior vena cava drain normally to the right atrium and measured 15 mm in diameter.  The right superior vena cava drain normally to the right atrium.  The aortic arch was left-sided and unobstructed.  Doppler analysis using pulse, continuous and color-flow:  Tricuspid stenosis was present with an 11 peak and 6 mean mm Hg gradient, mild and unchanged.  Tricuspid insufficiency was present which was trace, with a gradient of 36 mm of mercury, indicating mildly elevated right ventricular pressures which has increased since the previous study (23 mm Hg).  Pulmonary insufficiency was present which was mild.  The gradient across the bioprosthetic pulmonary valve was 44 mm of mercury peak and 24 mm of mercury mean indicating mild-to-moderate obstruction which has increased since the previous study (37 mm Hg peak).  Mitral insufficiency was present which was mild, unchanged.  No aortic stenosis or aortic insufficiency.  Bidirectional flow in the patent veins indicated right atrial hypertension.  Impression:  Tetralogy of Fallot status post complete repair and placement of a bioprosthetic valve in the pulmonary position.  Good right ventricular function.  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  Mild pulmonary insufficiency which is unchanged.  Normal branch pulmonary arteries.  Dilated right atrium measuring 57 x 52 mm which has increased.  Borderline low left ventricular function which has improved to an ejection fraction of 54%.  Dilated left atrium measuring 44 mm, unchanged.  Mild tricuspid stenosis with  11 peak and 6 mean mm Hg gradient, unchanged.    An exercise stress test was obtained which showed that Sean exercise 8 min and 1 sec of the Sam protocol, demonstrating low level of exercise for age, but improved since the previous study.  Baseline heart rate was 67 beats per minute, increased to 142 beats per minute at peak exercise (patient on beta-blocker), before decreasing back to 76 beats per minute after 7 min of recovery.  Baseline blood pressure was 110/73 mm of mercury, increased to 146/84 mm of mercury 1 min into recovery, before decreasing back to 116/78 mm of mercury after 7 min of recovery.  Patient remained fully saturated between 98 and 100% throughout exercise and recovery.  Baseline rhythm was sinus rhythm.  Occasional to frequent unifocal PVCs started in stage I and continued into the beginning of stage II.  No PVCs were present during the latter part of stage II, the first 2 min of stage III, and all of recovery.  There were baseline T-wave inversions in leads I and II, and T-wave flattening in leads AVF, V5 and V6.  These changes did not progress throughout exercise and there were no ST segment depressions.  Impression:  Low level of exercise for age but improved since the previous study.  Adequate heart rate response to exercise on beta blocker and adequate blood pressure response to exercise.  Occasional to frequent unifocal PVCs during stage I and II which are than suppressed by exercise and do not recur.  Baseline T-wave inversions in leads I and II, and T-wave flattening in leads AVF, V5 and V6 which do not progress, nor are there any ST segment depressions with exercise.  Normal arterial saturations.    My impression from this visit is that Sean has a poorly functioning bioprosthetic pulmonary valve that should be replaced prior to her pregnancy.  She also has constrictive pericarditis as evidence today with her dilated right atrium and left atrium, which is the larger threat to  her pregnancy, however she has enough risk factors that I feel any hemodynamic abnormalities that can be corrected at a relatively low risk prior to pregnancy should be performed.  My impression is that Sean's overall cardiac condition has improved, probably secondary to her exercise regimen and medical management, rendering her a better candidate for pregnancy.  She has mild tricuspid stenosis which is unchanged.  Her pacemaker is functioning well and her rhythm is controlled on her current dose of metoprolol.    For the above reasons, I will present Steven in cardiac catheterization/surgery Conference for placement of a Serina valve prior to pregnancy.  I have also referred her for a high risk maternal fetal evaluation. Sean was instructed to continue on the same doses of these medications.  She was educated that the enalapril must be stopped prior to pregnancy and we will replace it with a different vaso dilator.  She should continue with her exercise regimen and attempt to lose more weight.  I have asked her to make another appointment in this clinic in about 4 months during our pacemaker Clinic, where her pacemaker and rhythm will be evaluated, and at that time we will obtain some laboratory values.      Further disposition for the cardiac status of Sean Baez will be determined following the discussion of her case in catheterization conference, her evaluation by the high risk maternal fetal team, and her repeat evaluation in pacemaker clinic in 4 months.  SBE prophylaxis continues to be in order for dental and surgical procedures.     Thank you very much for allowing up to participate the care of your patient.       Sincerely      Chantel Saleh

## 2019-04-08 VITALS
DIASTOLIC BLOOD PRESSURE: 78 MMHG | OXYGEN SATURATION: 99 % | HEIGHT: 69 IN | HEART RATE: 95 BPM | WEIGHT: 248.56 LBS | SYSTOLIC BLOOD PRESSURE: 117 MMHG | BODY MASS INDEX: 36.81 KG/M2

## 2019-05-20 ENCOUNTER — CLINICAL SUPPORT (OUTPATIENT)
Dept: PEDIATRIC CARDIOLOGY | Facility: CLINIC | Age: 34
End: 2019-05-20
Attending: PEDIATRICS
Payer: COMMERCIAL

## 2019-05-20 DIAGNOSIS — Q24.9 ADULT CONGENITAL HEART DISEASE: ICD-10-CM

## 2019-05-20 DIAGNOSIS — Q21.3 TOF (TETRALOGY OF FALLOT): ICD-10-CM

## 2019-05-20 DIAGNOSIS — I47.20 VT (VENTRICULAR TACHYCARDIA): ICD-10-CM

## 2019-05-20 PROCEDURE — 93295 DEV INTERROG REMOTE 1/2/MLT: CPT | Mod: S$GLB,,, | Performed by: PEDIATRICS

## 2019-05-20 PROCEDURE — 93295 CV ICD REMOTE PEDIATRICS (CUPID ONLY): ICD-10-PCS | Mod: S$GLB,,, | Performed by: PEDIATRICS

## 2019-05-20 PROCEDURE — 93296 CV ICD REMOTE PEDIATRICS (CUPID ONLY): ICD-10-PCS | Mod: S$GLB,,, | Performed by: PEDIATRICS

## 2019-05-20 PROCEDURE — 93296 REM INTERROG EVL PM/IDS: CPT | Mod: S$GLB,,, | Performed by: PEDIATRICS

## 2019-05-24 LAB
AV DELAY - LONGEST: 180 MSEC
BATTERY VOLTAGE (V): 3.02 V
CHARGE TIME (SEC): 3.8 SEC
ERI (V): 2.73 V
HV IMPEDANCE (OHM): 57 OHM
IMPEDANCE RA LEAD (NATIVE): 722 OHMS
IMPEDANCE RA LEAD: 646 OHMS
OHS CV DC PP MS1: 0.4 MS
OHS CV DC PP MS2: 0.4 MS
OHS CV DC PP V1: 1.5 V
OHS CV DC PP V2: 1.75 V
P/R-WAVE RA LEAD (NATIVE): 7.8 MV
P/R-WAVE RA LEAD: 0.8 MV
PV DELAY - LONGEST: 150 MSEC
THRESHOLD MS RA LEAD (NATIVE): 0.4 MS
THRESHOLD MS RA LEAD: 0.4 MS
THRESHOLD V RA LEAD (NATIVE): 0.88 V
THRESHOLD V RA LEAD: 0.75 V

## 2019-07-29 ENCOUNTER — LAB VISIT (OUTPATIENT)
Dept: LAB | Facility: OTHER | Age: 34
End: 2019-07-29
Payer: COMMERCIAL

## 2019-07-29 ENCOUNTER — CLINICAL SUPPORT (OUTPATIENT)
Dept: CARDIOLOGY | Facility: CLINIC | Age: 34
End: 2019-07-29
Payer: COMMERCIAL

## 2019-07-29 ENCOUNTER — OFFICE VISIT (OUTPATIENT)
Dept: CARDIOLOGY | Facility: CLINIC | Age: 34
End: 2019-07-29
Payer: COMMERCIAL

## 2019-07-29 VITALS
DIASTOLIC BLOOD PRESSURE: 79 MMHG | SYSTOLIC BLOOD PRESSURE: 114 MMHG | WEIGHT: 249 LBS | BODY MASS INDEX: 36.88 KG/M2 | HEART RATE: 87 BPM | HEIGHT: 69 IN | OXYGEN SATURATION: 96 %

## 2019-07-29 DIAGNOSIS — Z95.810 AICD (AUTOMATIC CARDIOVERTER/DEFIBRILLATOR) PRESENT: ICD-10-CM

## 2019-07-29 DIAGNOSIS — J98.4 PULMONARY INSUFFICIENCY: ICD-10-CM

## 2019-07-29 DIAGNOSIS — I31.1 CONSTRICTIVE PERICARDITIS: ICD-10-CM

## 2019-07-29 DIAGNOSIS — Z87.74 TETRALOGY OF FALLOT S/P REPAIR: Primary | ICD-10-CM

## 2019-07-29 DIAGNOSIS — Z95.2 S/P PULMONARY VALVE REPLACEMENT: ICD-10-CM

## 2019-07-29 DIAGNOSIS — Z87.74 TETRALOGY OF FALLOT S/P REPAIR: ICD-10-CM

## 2019-07-29 DIAGNOSIS — I47.20 VT (VENTRICULAR TACHYCARDIA): ICD-10-CM

## 2019-07-29 DIAGNOSIS — E03.9 ACQUIRED HYPOTHYROIDISM: ICD-10-CM

## 2019-07-29 DIAGNOSIS — Q21.3 TOF (TETRALOGY OF FALLOT): ICD-10-CM

## 2019-07-29 DIAGNOSIS — I42.5 CONSTRICTIVE CARDIOMYOPATHY: ICD-10-CM

## 2019-07-29 LAB
25(OH)D3+25(OH)D2 SERPL-MCNC: 26 NG/ML (ref 30–96)
ALBUMIN SERPL BCP-MCNC: 3.9 G/DL (ref 3.5–5.2)
ALP SERPL-CCNC: 100 U/L (ref 55–135)
ALT SERPL W/O P-5'-P-CCNC: 27 U/L (ref 10–44)
ANION GAP SERPL CALC-SCNC: 7 MMOL/L (ref 8–16)
AST SERPL-CCNC: 18 U/L (ref 10–40)
BILIRUB SERPL-MCNC: 1.8 MG/DL (ref 0.1–1)
BNP SERPL-MCNC: 141 PG/ML (ref 0–99)
BUN SERPL-MCNC: 10 MG/DL (ref 6–20)
CALCIUM SERPL-MCNC: 10.3 MG/DL (ref 8.7–10.5)
CHLORIDE SERPL-SCNC: 103 MMOL/L (ref 95–110)
CHOLEST SERPL-MCNC: 140 MG/DL (ref 120–199)
CHOLEST/HDLC SERPL: 3.5 {RATIO} (ref 2–5)
CO2 SERPL-SCNC: 29 MMOL/L (ref 23–29)
CREAT SERPL-MCNC: 0.9 MG/DL (ref 0.5–1.4)
ERYTHROCYTE [DISTWIDTH] IN BLOOD BY AUTOMATED COUNT: 12.8 % (ref 11.5–14.5)
EST. GFR  (AFRICAN AMERICAN): >60 ML/MIN/1.73 M^2
EST. GFR  (NON AFRICAN AMERICAN): >60 ML/MIN/1.73 M^2
GLUCOSE SERPL-MCNC: 93 MG/DL (ref 70–110)
HCT VFR BLD AUTO: 41.9 % (ref 37–48.5)
HDLC SERPL-MCNC: 40 MG/DL (ref 40–75)
HDLC SERPL: 28.6 % (ref 20–50)
HGB BLD-MCNC: 13.7 G/DL (ref 12–16)
LDLC SERPL CALC-MCNC: 88.8 MG/DL (ref 63–159)
MCH RBC QN AUTO: 27.8 PG (ref 27–31)
MCHC RBC AUTO-ENTMCNC: 32.7 G/DL (ref 32–36)
MCV RBC AUTO: 85 FL (ref 82–98)
NONHDLC SERPL-MCNC: 100 MG/DL
PLATELET # BLD AUTO: 367 K/UL (ref 150–350)
PMV BLD AUTO: 9.8 FL (ref 9.2–12.9)
POTASSIUM SERPL-SCNC: 3.9 MMOL/L (ref 3.5–5.1)
PROT SERPL-MCNC: 7.8 G/DL (ref 6–8.4)
RBC # BLD AUTO: 4.93 M/UL (ref 4–5.4)
SODIUM SERPL-SCNC: 139 MMOL/L (ref 136–145)
TRIGL SERPL-MCNC: 56 MG/DL (ref 30–150)
WBC # BLD AUTO: 9.01 K/UL (ref 3.9–12.7)

## 2019-07-29 PROCEDURE — 93000 ELECTROCARDIOGRAM COMPLETE: CPT | Mod: S$GLB,,, | Performed by: PEDIATRICS

## 2019-07-29 PROCEDURE — 93320 PR DOPPLER ECHO HEART,COMPLETE: ICD-10-PCS | Mod: S$GLB,,, | Performed by: PEDIATRICS

## 2019-07-29 PROCEDURE — 83880 ASSAY OF NATRIURETIC PEPTIDE: CPT

## 2019-07-29 PROCEDURE — 93325 DOPPLER ECHO COLOR FLOW MAPG: CPT | Mod: S$GLB,,, | Performed by: PEDIATRICS

## 2019-07-29 PROCEDURE — 93320 DOPPLER ECHO COMPLETE: CPT | Mod: S$GLB,,, | Performed by: PEDIATRICS

## 2019-07-29 PROCEDURE — 99215 OFFICE O/P EST HI 40 MIN: CPT | Mod: 25,S$GLB,, | Performed by: PEDIATRICS

## 2019-07-29 PROCEDURE — 93000 PR ELECTROCARDIOGRAM, COMPLETE: ICD-10-PCS | Mod: S$GLB,,, | Performed by: PEDIATRICS

## 2019-07-29 PROCEDURE — 93303 ECHO TRANSTHORACIC: CPT | Mod: S$GLB,,, | Performed by: PEDIATRICS

## 2019-07-29 PROCEDURE — 93303 PR ECHO XTHORACIC,CONG A2M,COMPLETE: ICD-10-PCS | Mod: S$GLB,,, | Performed by: PEDIATRICS

## 2019-07-29 PROCEDURE — 93325 PR DOPPLER COLOR FLOW VELOCITY MAP: ICD-10-PCS | Mod: S$GLB,,, | Performed by: PEDIATRICS

## 2019-07-29 PROCEDURE — 80053 COMPREHEN METABOLIC PANEL: CPT

## 2019-07-29 PROCEDURE — 80061 LIPID PANEL: CPT

## 2019-07-29 PROCEDURE — 82306 VITAMIN D 25 HYDROXY: CPT

## 2019-07-29 PROCEDURE — 99215 PR OFFICE/OUTPT VISIT, EST, LEVL V, 40-54 MIN: ICD-10-PCS | Mod: 25,S$GLB,, | Performed by: PEDIATRICS

## 2019-07-29 PROCEDURE — 85027 COMPLETE CBC AUTOMATED: CPT

## 2019-07-29 PROCEDURE — 3008F BODY MASS INDEX DOCD: CPT | Mod: CPTII,S$GLB,, | Performed by: PEDIATRICS

## 2019-07-29 PROCEDURE — 36415 COLL VENOUS BLD VENIPUNCTURE: CPT

## 2019-07-29 PROCEDURE — 3008F PR BODY MASS INDEX (BMI) DOCUMENTED: ICD-10-PCS | Mod: CPTII,S$GLB,, | Performed by: PEDIATRICS

## 2019-07-29 NOTE — PROGRESS NOTES
Sean Baez was seen in the Adult with Congenital Heart Disease Clinic at [facility], [place], operated by [facility] on July 29, 2019.  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   She requires a pulmonary valve replacement.  I will summarize her rather complex course.       Sean underwent repair of tetralogy of Fallot in early childhood, and then a pulmonary valve replacement later in childhood.  In May 2005, she had a second pulmonary valve replacement with a 29 mm Mosiac porcine valve in the pulmonary position because of pulmonary insufficiency, and placement of an epicardial pacemaker for sinus node dysfunction, and placement of an automatic intracardiac defibrillator with an epicardial patch for ventricular tachycardia.  The AICD generator was changed in 2011.  We followed her for many years in this clinic with a diagnosis of right ventricular dysfunction, a well-functioning porcine pulmonary valve, ventricular tachycardia and sinus node dysfunction, pacemaker and AICD, tricuspid stenosis, mitral insufficiency, mild left ventricular dysfunction and obesity. Her rhythm was well controlled over the last several years, with only one inappropriate shock about 9 years ago requiring a pacemaker adjustment.       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  During that time she had reasonable exercise tolerance, pulmonary valve function and no arrhythmias.      At a clinic visit in April, 2017, she was feeling  well.  Physical exam showed a grade 2 to 3/6 systolic ejection murmur best heard at the left mid to upper sternal border and over the precordium, and a newly heard diastolic rumble at the left lower sternal border. The liver edge was 2 cm below the right costal margin.  Pulses were 2+ in brachial and 1+ in femoral, although it was difficult to feel her pulse is secondary to obesity.  There was no peripheral edema. An EKG showed sinus rhythm at 68 bpm, normal atrial and ventricular forces, and T-wave inversions in lead 1 V5 and V6 suggestive of ischemia (unchanged). An echocardiogram showed an aortic root of 30 mm, the left atrium was dilated at 44 mm, (no old values for comparison), the right ventricle was 34 mm which was slightly increased from 32 mm, and the fractional area change of 30%, showed mildly reduced function which had improved.  The interventricular septum measured 11 on the left ventricular posterior wall 12 mm which are at the upper limits of normal, the left ventricular internal dimension in diastole was 52 and in systole 36 mm giving him measured ejection fraction of 57%.  The ejection fraction was slightly decreased but improved since the previous value.  The pulmonary valve annulus measured 19 mm. There was slight paradoxic motion of the interventricular septum, with otherwise normal-appearing right and left ventricular function. The tricuspid valve domed in diastole.  The pulmonary valve leaflets could be seen and the pulmonary annulus of 19 mm was probably underestimated.  The right atrium was dilated at 61 x 57 mm.  The left pulmonary artery measured 12 mm and the right pulmonary artery could not be seen.  The aortic arch was left-sided and unobstructed.  The gradient across the tricuspid valve was 9 peak and 4 mean mmHg gradient indicating mild stenosis, unchanged.  There was trivial tricuspid regurgitation with a regurgitant gradient of 24 mmHg suggesting normal right ventricular pressure.   The gradient across the porcine pulmonary valve was 22 peak and 13 mean mmHg showing trivial obstruction, and there was trivial pulmonary insufficiency.  There was mild mitral insufficiency and no residual ventricular septal defect.  An exercise stress test showed karma Harris exercised 7 minutes and 30 seconds of the Sam protocol, and then stopped due to fatigue and leg cramps.  Baseline blood pressure was 108/77 mmHg and then increased to 159/68 mmHg, which may be an underestimate, before returning to baseline.  Baseline heart rate was 68 bpm and increased to 147 bpm before returning to baseline.  There were initially T-wave inversions in lead I V5 and V6, which then reverted to upright during exercise, before becoming negative again in recovery.  There were no other ST segment changes or arrhythmias.      At that visit, I felt  that Steven was stable with her complex congenital heart disease.  Her right and left ventricular function had improved.  Her porcine pulmonary valve was functioning well with only mild stenosis and insufficiency.  She had mild tricuspid stenosis with significant right atrial dilatation, also unchanged.  She had left atrial dilatation which I felt was secondary to her mitral insufficiency and perhaps some diastolic dysfunction, also unchanged.  Per her report, her pacemaker was functioning properly and her rhythm was controlled on her current dose of metoprolol.  I requested records from her electrophysiologist in [place]. No cardiac intervention was recommended  A CBC, CMP, BNP, and lipid profile were drawn which showed a normal CBC, and normal CMP, a BNP of 82 pg/mL, an elevated total cholesterol of 220 mg/dL, a low HDL cholesterol of 36 mg/dL, normal triglycerides, and an elevated LDL cholesterol of 168 mg/dL.  A chest x-ray was obtained at that visit which showed mild cardiomegaly with normal vascularity, epicardial pacing leads on the right atrium and right ventricle and the AICD  patch at the apex.  Sean reported that she had become engaged and desired pregnancy in the future. At that visit I felt she would be able to tolerate a pregnancy.       At a clinic in November, 2017, Sean was not feeling well.  Approximately 3 months prior, while walking on the beach, she had the sudden onset of fatigue, shortness of breath, palpitations and sweating, but no chest pain.  Although she sat down and went into air-conditioning, the fatigue, shortness of breath and sweating persisted for another 30 minutes. Following that, she has noticed an increase in fatigue and exercise intolerance.  She has not participated in any exercise for that reason.  She reported these were the same symptoms as before her last valve replacement. Sean also had a very busy life and lots of stresses.  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  She reported increased fatigue with these activities and decreased energy. She had not had a pacemaker check in 9 months.  She was recently started on atorvastatin for elevated cholesterol, which was her only medication change.  She was taking metoprolol succinate 50 mg by mouth daily, enalapril 5 mg in the morning and 2.5 mg at night, aspirin 81 mg by mouth daily, atorvastatin 20 mg by mouth daily, Zoloft 100 mg by mouth daily, and venlafaxine 150 mg by mouth daily.  Her exam showed she had gained weight to 117 kg, a 2 kg weight increase. Her blood pressure in the right arm was 119/76 mmHg.  Pulse was 91 bpm and pulsed oximetry in room air was 98%.  First heart sound was normal and second heart sound was widely split. There was a grade 2/6 systolic ejection murmur best heard at the left upper sternal border and down the left sternal border.  A diastolic rumble was heard.   The liver was 2 cm below the right costal margin and unchanged.  The pacemaker was palpated in the upper mid abdomen.  Pulses were 2+ bilaterally except for 1+ in the right femoral.  There was no peripheral edema.     EKG showed sinus rhythm at a rate of 73 bpm with first-degree block (386 ms).  T-wave inversions in leads II and aVF, normal ventricular forces, and T wave inversions in leads V1 and V5, with T-wave flattening in V6.  Compared to the previous EKG, it was no change.     Echocardiogram showed the anatomy for tetralogy of Fallot status post complete repair and status post a pulmonary valve replacement.  It should be mentioned that the heart was difficult to visualize and images were of poor quality and attributed to obesity.  The aortic root measured 30 mm, unchanged, left atrium 35 mm, improved, and right ventricle 34 mm, unchanged.  The interventricular septum measured 11 and the left ventricular posterior wall 11 mm both of which were at the upper limits of normal.  The left ventricular internal dimension in diastole was 56 and in systole 43 mm giving a calculated ejection fraction of 46% which has decreased since the previous study.  There was flattening of septal wall motion.  The left ventricular posterior wall bowed posteriorly.  The pulmonary valve leaflets were difficult to see.  The tricuspid valve leaflets were difficult to see, but the tricuspid valve annulus was subjectively smaller than the mitral valve annulus.   Right ventricular function was judged subjectively to be mildly depressed, but the fractional area change was measured at 30%, which is within normal limits.   The branch pulmonary arteries could not be imaged. The aortic arch was left-sided and unobstructed.  The gradient across the tricuspid valve was 10 peak and 5 mean mmHg indicating mild tricuspid stenosis, unchanged.  The gradient across the bioprosthetic pulmonary valve was 23 peak mmHg, unchanged.  Trivial pulmonary  insufficiency.  No tricuspid insufficiency.  No aortic stenosis or aortic insufficiency.  Trivial mitral insufficiency, improved.  No aortic arch obstruction.       The pacemaker was then downloaded by the [employer] representative, and it showed that she is atrially paced 6% of the time with no ventricular pacing.  She had a prolonged AV interval of 370 ms. Her underlying rhythm was sinus bradycardia with a ventricular rate in the 40s.  She had no abnormal tachycardias greater than 150 bpm.  Her atrial and ventricular thresholds were good.  She had less than one year life on her battery.  We adjusted her pacemaker settings to have rates detected (monitor zone) greater than 130 bpm for 32 beats in duration.     At that visit,  my impression was Sean had a decrease in exercise tolerance and an increase in fatigue which was cardiac related.  She was difficult to evaluate by transthoracic echocardiography, probably secondary to obesity. I felt  her episode was secondary to an increase in tricuspid stenosis or bioprosthetic pulmonary valve dysfunction, and a decrease in left ventricular function.  Her symptoms may have been exacerbated by the stresses of finishing her residency, moving across the country, starting a new job, and planning a wedding.       Therefore, I began Sean on coenzyme Q 10 100 mg by mouth twice a day, for her left ventricular dysfunction, right ventricular dysfunction, and because she was started on a statin drug.   I presented her in cath/surgery conference for cardiac catheterization, where her tricuspid valve, pulmonary valve and coronary arteries could be evaluated, and possibly a Serina valve could be implanted and or a tricuspid valve balloon.  At the same time, I wanted her to have a transesophageal echocardiogram since her transthoracic imaging was poor.   I kept her on the same doses of her other medications, enalapril, metoprolol, atorvastatin, and aspirin.    Laboratory work which  was obtained at that visit which subsequently showed a normal CBC with the exception of a mildly elevated platelet count of 352,000, and a normal CMP.  Of note, her BNP was mildly elevated to  162 pg/mL, compared to the previous value.  A free T4 was normal at 0.9 ng/dL, but her free T3 was decreased at 1.8 pg/mL, although her TSH was normal at 1.4 to micro-units per milliliter.  A cholesterol profile was repeated now that she was on atorvastatin, and that showed the cholesterol had decreased to 179 mg/dL, and the LDL cholesterol had decreased to 123.4 mg/dL, which were now normal, although her HDL remained decreased at 37 mg/dL.  Sean then saw her internal medicine doctor who began her on thyroid replacement hormone.        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  A catheterization and possible Serina valve implantation has been scheduled for February 6, 2018.  Her wedding was March 10, 2018.  Her work schedule was rather demanding, with 7-9 days in-house as a hospitalist, followed by 7 days off.   Sean was feeling better than she was at her last clinic visit, and in particular had less fatigue.  She was still short of breath with climbing stairs, but has not had any further episodes of sudden onset of shortness of breath, weakness, sweating, palpitations or chest pain.  She had started a diet and light exercise program.     She was taking metoprolol XL 50 mg by mouth daily, enalapril 5 mg in the morning and 2.5 mg at night, aspirin 81 mg by mouth daily, coenzyme Q10 200 mg by mouth twice a day, atorvastatin 20 mg by mouth daily, Synthroid 150 µg by mouth daily, Zoloft 100 mg by mouth daily, and had weaned her Effexor down to 75 mg by mouth daily.     Exam revealed an obese but very pleasant and otherwise healthy appearing young woman  in no acute distress.  Height was 175.3 cm and weight was 116.8 kg, a 1 kg weight decrease since her last clinic visit. First heart sound was normal and second heart sound was widely split.  The was a grade 2/6 systolic ejection murmur heard best at the left upper sternal border, and faintly over the precordium.  Diastole was clear (the previous diastolic rumble had resolved).  The liver edge was 2 cm below the right costal margin and unchanged.  Pulses were 2+ bilaterally.  There was no peripheral edema.     EKG showed an atrially paced rhythm at a rate of 68 bpm with a prolonged NH interval of 112 ms, with right ventricular conduction delay and negative T waves in the left precordial leads, unchanged.     Echocardiogram showed the anatomy for status post repair of tetralogy of Fallot and placement of a bioprosthetic valve in the pulmonary position.  The aortic root was mildly dilated at 31 mm, unchanged, the left atrium had increased to 45 mm, and the right ventricle measured 29 mm, unchanged.  The interventricular septum measured 12 and the left ventricular posterior wall 12 mm which were at the upper limits of normal.  The left ventricular internal dimension in diastole was 53 and in systole 37 mm giving a calculated ejection fraction of 58%, which had improved significantly from her previous ejection fraction of 46% 2 months ago.  The right ventricle was not seen well enough quantitate function, but subjectively appeared improved since the previous visit.  They bioprosthetic valve was poorly seen in the pulmonary position, and the leaflets were thick and moving poorly.  The right atrium was significantly dilated at 54 x 53 mm.  The tricuspid valve was not seen well enough to measure the annulus.  pulmonary arteries could not be imaged.  The aortic arch was left-sided  The gradient across the tricuspid valve was 11 peak and 5 mean millimeters of mercury indicating mild stenosis which was unchanged.  The gradient  across the right ventricular outflow tract was 27 mmHg peak.  There was mild pulmonary insufficiency. There was trivial tricuspid insufficiency which was inadequate to estimate right ventricular pressures.       Sean was evaluated by Dr. Chintan Cabrera, who felt that Sean did not have any abnormal tachycardia arrhythmias.  Her defibrillator, was approaching end-of-life.  Please see Dr. Cabrera's evaluation for more details.  He recommended that Sean receive monthly pacemaker/AICD evaluations to determine the best time for replacement.     My impression at that visit was Sean's left ventricular function had improved significantly since being placed on coenzyme every 10 and thyroid replacement hormone.  I believed this had led to a decrease in symptoms and an improvement in energy level.  I continued to feel, however, that Sean still required a transesophageal echocardiogram and a cardiac catheterization and possible Serina valve replacement.  I did feel, however, that this could be delayed until after her wedding, to avoid the unlikely chance of a complication that might impact her wedding.   My opinion was that waiting an additional month to perform the catheterization would not adversely affect her health.  I discussed this with Sean and her fiancé, and they were in agreement.     Sean expressed the desire to undergo pregnancy at some time in the future.  I recommended she have her cardiac catheterization and possible Serina valve implantation, and also her AICD generator change, before becoming pregnant.  I continued the same doses of her medications.   SBE prophylaxis was recommended for dental and surgical procedures.       Sean was seen again in clinic in February, 2018.  She was generally feeling well except for one episode of tachycardia, shortness of breath and nausea that occurred out while working out with her  in the morning, and she had not yet eaten breakfast.  She had no  more recurrences of these symptoms even though she has had several training sessions since then.  She otherwise felt well, and participated in all activities including exercising and working as a hospitalist, and denied chest pain, palpitations and shortness of breath and swelling.  She had postponed her cardiac catheterization and possible Serina valve placement until after the wedding.  She continued taking metoprolol 50 mg by mouth daily, enalapril 5 mg in the morning and 2.5 mg in the evening, aspirin 81 mg by mouth daily, coenzyme Q 10 100 mg by mouth twice a day, Synthroid 150 µg by mouth daily, and Zoloft 100 mg by mouth daily.  She has been weaned off her Effexor.     Physical exam revealed an obese, pleasant and healthy-appearing woman in no acute distress.  Vital signs showed a height of 175.3 cm or 5 feet 9 inches and a weight of 119 kg or 262 lbs. 7 oz., which is a weight increase of 2.2 kg since her last clinic visit.  Blood pressure in the right arm was 107/73 mmHg, pulse 87 bpm, and pulsed oximetry in room air 98%. Examination of the skin showed it to be pink and well perfused.  The lungs were clear.  Palpation of the precordium showed it to be normal with a well-healed midline scar, and a pacemaker palpated in the abdomen.  The liver edge was 2 cm below the right costal margin, unchanged.  Pulses were 2+ bilaterally.  There was no peripheral edema.     EKG was obtained which showed a paced atrial rhythm at 78 bpm, with right ventricular conduction delay, and T-wave inversions in the limband precordial leads suggestive of ischemia, unchanged.     Echocardiogram showed evidence for repair of tetralogy of Fallot status post pulmonary valve replacement.  Two-dimensional imaging was poor.  M-mode parameters were as follows: The aortic root measured 30 mm, left atrium 43 mm, right ventricle 26 mm, interventricular septum 12 and left ventricular posterior wall 12 mm, the left ventricular internal dimension  in diastole was 56 and in systole 36 mm giving a calculated ejection fraction of 55% and these numbers are normal.  The right ventricle function was judged subjectively to be reduced but unable to be quantitated The bioprosthetic valve in the pulmonary position was difficult to see, however thickened leaflets with poor movement were observed.  The aortic arch was left-sided and unobstructed.  The superior vena cava drains normally to the right atrium.  Doppler analysis using pulsed continuous-wave and color-flow:  The there was turbulence across the tricuspid valve with a gradient of 14 peak and 10 mean millimeters of mercury indicating mild tricuspid valve stenosis which was unchanged.  Mitral insufficiency was present which was mild.  The gradient across the bioprosthetic pulmonary valve was 32 mmHg peak in the setting of reduced right ventricular function.  No residual ventriculoseptal defect.  No aortic stenosis or aortic insufficiency. Impression: Status post repair of tetralogy of Fallot. Poor two-dimensional imaging.  Decreased right ventricular function.  Bioprosthetic pulmonary valve with thickened and poorly moving leaflets, and a 32 mmHg peak gradient across it in the setting of reduced right ventricular function.  Mild tricuspid valve stenosis with a 14 peak and 10 mean millimeter gradient across it, unchanged.     Sean was then evaluated by electrophysiologist Dr. Chintan Cabrera, performed a download of her pacemaker, and that showed that there was a 6 beat run of ventricular tachycardia which resolved spontaneously, and that the pacemaker generator close to end-of-life.     My impression from that visit was that Sean had right ventricular dysfunction and pulmonary valve dysfunction, in particular pulmonary valve stenosis.   I felt that she required a pulmonary valve replacement which hopefully could be performed with a Serina valve.  I believed her right ventricular function would improve after  that.  She also has ventricular tachycardia, but it was self-limited and she was protected by both her metoprolol and her AICD.  The generator also required replacement.  Dr. Cabrera has recommended the metoprolol be increased to 50 mg by mouth twice a day. Following this clinic visit, Sean called with some leg swelling, and was placed on Lasix 20 mg by mouth daily, in addition to her other medications.     Sean had an uneventful wedding and honeymoon.  She underwent replacement of her ICD generator by Dr. Chintan Cabrera on April 4, 2018.  An ICD PAWAN ROSS DR [id number]: Serial No. [id number] was placed in the abdominal pocket and the previous generator removed.  She tolerated the procedure well.  Lead testing revealed:      RV Lead:       Threshold: 1.3 V / 0.4 ms       Impedance: 646 ohms       R wave: 5.9 mV  RA Lead:       Threshold: 1.3 V / 0.4 ms       Impedance: 589 ohms       P wave: .5 mV     Sean underwent transesophageal echocardiogram and cardiac catheterization under general anesthesia on April 26, 2018.  Transesophageal echocardiogram showed:     Mild right ventricular dilatation.  Paradoxic motion of the interventricular septum.    Normal left ventricle structure and size, with normal left ventricular posterior wall motion.  Normal to mildly decreased right ventricular free wall motion.  No pericardial effusion with normal-appearing pericardium.  No atrial or ventricular shunts.  Tethering of the tricuspid valve septal leaflet, with mild doming mild tricuspid valve prolapse.  Normal tricuspid valve velocity, and mild tricuspid valve insufficiency, with a 29 mmHg gradient.    Immobile and echodense posterior leaflet of the prosthetic pulmonary valve, with a peak gradient of 17 mmHg and mild pulmonary insufficiency.     Catheterization showed:     Saturations: SVC 69%, RA 72%, right pulmonary artery 69%, left pulmonary artery 69%, aortic 99%.    Pressures in mmHg:  RA mean 19, RV 50/19,  "RPA  40/14 mean  25, LPA 43/16 mean 26, RPCWP mean 20, LPCWP mean 20, LV 101/18, ascending aorta 101/59  mean  77, and descending aorta 99/58 mean 75.    Calculations:  Using a hemoglobin of 11.3,  the Qp equaled to Qs at 2.55 L/min/m2, which gave a pulmonary vascular resistance of 2.36 Wood units/m2.  The raw cardiac output was 5.73 liters per minute.     Coronary angiography was then performed which showed normal left and right coronary arteries.  No other angiography was performed.  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     Sean's postprocedure follow-up clinic visit was on May 1, 2018.  Since her discharge from the hospital several days prior, she had been feeling generally well.  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  She had in general noticed increasing shortness of breath with walking compared to 6 months ago. 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  Her swelling had improved after starting the Lasix, she only noticed ankle swelling when on her feet for several hours.  She also noticed an improvement in palpitations after increasing her metoprolol to 50 mg by mouth twice a day, however she continued to experience palpitations every night when lying down, which lasted for a few seconds.  She had occasional "tingling" in her left upper chest which lasted for several seconds and occurred once a week, but no chest pain.  She had not exercised in over one month.  She has continued to gain weight.     Sean was taking Lasix 20 mg by mouth daily added about 2 months prior, coenzyme every 10 100 mg by mouth twice a day, enalapril 5 mg in the morning and 2.5 mg at night, metoprolol tartrate 50 mg by mouth twice a day, Synthroid, and atorvastatin.  She was no longer taking aspirin or Zoloft.     Physical " exam revealed a pleasant, obese and healthy-appearing young woman in no acute distress.  Vital signs showed a height of 175.3 cm or 5 feet 9 inches, and a weight of 121.3 kg or 267 lbs. 6 oz., which is an increase of 2 kg from her last clinic visit.  Blood pressure was 105/64 mmHg and heart rate 65 bpm.  Pulsed oximetry was not obtained     Examination of the skin showed it to be pink and well perfused area the lungs were clear.  Palpation of the precordium showed it to be normal with a well-healed midline scar.  First heart sound was normal and second heart sound was widely split.  There was a grade 2/6 systolic ejection murmur fairly well localized to the left upper sternal border.  I no longer head the diastolic rumble.  The liver edge was 2 cm below the right costal margin.  Pulses were 2+ bilaterally.  The wounds were healed.  There was no peripheral edema.     EKG showed sinus rhythm at 64 bpm, with first-degree block and a LA interval of 266 ms, right ventricular hypertrophy, and T-wave inversions in the inferior and precordial leads. This EKG had not changed since the previous one, however the T-wave in lead V6 has become inverted in the last year.     I reviewed the cardiac catheterization, pressure tracings and transesophageal echocardiogram with Sean and her .  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  I felt that this was the primary cause of her symptoms of exercise intolerance, fatigue and peripheral edema.  The cause of this was perplexing. It was possible that her AICD patch was contributing to diastolic dysfunction, and I felt she should also be worked up for pericardial and myocardial infiltrative diseases.  I continued to feel, however, that she would still benefit from a new pulmonary valve, since one of the valve leaflets was frozen, and the valve had been in for 13 years.  I  believed that the pulmonary valve stenosis, insufficiency and tricuspid valve stenosis was a minor contributor to her right-sided dysfunction and symptoms.  For now, I felt she should be medically managed.  Sean actually looked quite well at that visit, and therefore was continued on her current medical management.  I also felt that her persistent weight gain and obesity were contributing to exercise intolerance. Sean was also complaining of palpitations which needed to be characterized.     Therefore, Sean was kept on the same doses of her Lasix, coenzyme Q 10, enalapril, Synthroid, and atorvastatin.  We placed a 48 hour Holter monitor as surveillance for arrhythmias.  She was advised to slowly begin an exercise program and continue to lose weight with diet.  Laboratory work including an GWEN, rheumatoid factor, complement, sedimentation rate, high specificity CRP, CBC, reticulocyte count, BNP, and CMP.  These results subsequently showed: CBC was within normal limits; the CMP was normal with the exception of a bilirubin of 2.0 mg/dL and a bicarbonate of 22 mmol per liter; the BNP was elevated at 209 pg/mL; the high sensitivity CRP was elevated at 4.72 mg/L; the TSH was 0.037 micro international units per milliliter; the sedimentation rate was elevated at 46 mm/h; the C3 complement was elevated at 185 mg/dL.  The free T4, C4 complement, rheumatoid factor, GWEN and reticulocytes were all normal.  These results indicated an inflammatory process.  Sean was then referred to a rheumatologist to see if an inflammatory disease was contributing to her constrictive pericarditis.  She did not obtain that consultation. Sean's  Holter monitor subsequently showed sinus rhythm at rates 59 - 210 bpm with a heart average heart rate of 64 bpm, frequent premature atrial beats, atrial bigeminy and occasional atrial couplets, with a total of 1766 premature atrial beats in 24 hours, and rare PVCs.  The increase in atrial  ectopy was new.     Sean also was evaluated by Dr. Thad Ny at [facility] congenital [facility] disease program in [place].  Dr. Ny agreed that Sean had developed a constrictive pericarditis, and he felt it might be secondary to the AICD patch over the cardiac apex, and possibly intrinsic restrictive physiology from her tetralogy of Fallot.  He did not think a Serina valve placement was indicated at that time.  He felt that her tricuspid stenosis may have been secondary to her initial surgical repair.  He felt that her obesity was contributing to her dyspnea, and suggested a possible sleep study.  He felt that she probably would be able to tolerate a pregnancy in the future, but would be in a high risk category.  He recommended that her diuresis be increased and that her Lasix be increased to 40 mg by mouth daily.     Sean was then seen in May, 2018.  Since her last clinic visit 6 weeks prior, Sean was feeling better.  She had begun an exercise program with a , did primarily weight lifting but also walked on a treadmill for about 5 minutes.  She reported less shortness of breath with exercise.  Her energy level had improved since her last clinic visit.  Her previous complaints of palpitations had resolved. She was now active working 7 days at a time.  She was dieting and has lost about 12 pounds.     Sean was currently taking Lasix which had been increased to 40 mg by mouth daily and to which she has a good diuretic response, coenzyme every 10 100 mg by mouth twice a day, metoprolol tartrate 50 mg by mouth twice a day, enalapril 5 mg in the morning and 2.5 mg at night, Synthroid which had been decreased to 137 µg daily, Singulair 10 mg by mouth daily, levo cetirizine 5 mg by mouth daily, fish oil and multivitamins.     Physical exam revealed a pleasant, healthy-appearing obese young woman in no acute distress.  Vital signs showed a height of 175.3 cm or 5 feet 9 inches, and a  weight of 116.1 kg or 255 lbs. 15 oz., which was a 5 kg weight decrease since her last clinic visit.  Blood pressure in the right arm was 107/71 mmHg, pulse 84 bpm and pulsed oximetry in room air 95%.     Examination of the skin showed it to be pink and well perfused.  The lungs are clear.  Palpation of the precordium showed it to be normal with a well-healed midline scar.  First heart sound was normal and second heart sound widely split.  The was a grade 2/6 systolic ejection murmur best heard at the left upper sternal border but also over the precordium. Diastole was clear.  The liver edge was 2 cm below the right costal margin.  Pulses were 2+ bilaterally.  There was no peripheral edema.     EKG was obtained which showed an atrially paced rhythm at 70 bpm with an AV conduction time of 360 ms, right ventricular hypertrophy, and T-wave inversions in leads I, II, V5 and V6 and T-wave flattening in leads aVF indicating ischemia, with a QTc interval of 441.  Compared to the previous study there was a negative T-wave in lead I.     An echocardiogram was obtained which showed status post repair of tetralogy of Fallot.  Two-dimensional imaging was poor.  M-mode parameters were as follows: The aortic root measured 30 mm unchanged, left atrium was significantly dilated at 46 mm increased, right ventricle 20 mm by M-mode and 35 mm in the long axis view, the interventricular septum measured 10 and the left ventricular posterior wall 10 mm which were both normal.  The left ventricular internal dimension in diastole was 54 and in systole 41 mm giving a calculated ejection fraction of 47%.  The left ventricular ejection fraction has decreased since the previous study (56%).  There was no pericardial effusion.  There was decreased excursion of the septal leaflet of the tricuspid valve leading to inadequate tricuspid valve opening in diastole.  The pulmonary valve was echo dense and the posterior leaflet had decreased motion  although the valve was poorly seen in general.  The ventriculoseptal defect patch was in proper position.  The right atrium was dilated and measured 47 x 46 mm. There was subjectively normal right ventricular function with a fractional area change of 47%.  There was poor subcostal imaging.  The aortic arch was left-sided and unobstructed. The right superior vena cava drained normally to the right atrium.  Doppler analysis using pulsed, continuous wave and color-flow: Tricuspid stenosis was present with a 12 peak and 5 mean millimeter gradient across it indicating mild + tricuspid stenosis which was unchanged.  The gradient across the pulmonary valve was 25 mmHg peak indicating mild obstruction and there was mild to moderate pulmonary insufficiency.  No tricuspid insufficiency.  Trivial mitral insufficiency.  No aortic stenosis, aortic insufficiency, or residual ventriculoseptal defect.  Impression: Status post repair of tetralogy of Fallot with bioprosthetic valve in the pulmonary position.  Echodense pulmonary valve with decreased motion of the posterior leaflet, but only a 25 mmHg peak gradient across it indicating mild obstruction in the presence of normal right ventricular function, with mild to moderate pulmonary insufficiency.  Moderate left ventricular dysfunction which has increased.  Normal right ventricular function.  Dilated left atrium which has increased.  Dilated right atrium.  Tricuspid stenosis with decreased excursion of the septal leaflet.     An exercise stress test was performed which showed that [place] exercise 6 minutes and 49 seconds of the Sam protocol, indicating poor exercise tolerance for age.  Baseline heart rate was 63 bpm, increased to 137 bpm at peak exercise before decreasing back to 71 bpm 9 minutes into recovery.  This was a suboptimal peak heart rate which was probably secondary to beta-blockade.  Baseline blood pressure was 98/63 mmHg in the right arm, increased to 117/65 mmHg  1 minute into recovery, before decreasing back to 103/51 mmHg 7 minutes into recovery.  This was a blunted pressure response to exercise and may have been due to beta-blockade and ACE inhibition.  Baseline saturation was 97% and decreased to 87% at peak exercise, before increasing back to 98% 1 minute into recovery.  There were desaturations at peak exercise that probably represented intrapulmonary shunting.  Sinus rhythm with frequent PACs and occasional atrial couplets were present starting in stage II, which then resolved at peak exercise.  Occasional PACs were then seen again in recovery.   There were no ST segment depressions throughout exercise, in fact, the negative T-wave in leads 2 and V5 improved during exercise.  It should be noted that the patient was examined after exercise and the lungs were clear.  Impression: Suboptimal level of exercise for age, with blunted heart rate response and blood pressure response.  Moderate desaturations with exercise to 87% which then resolved.  Premature atrial complexes with atrial couplets increase in stage II and then are suppressed. Improvement in T-wave inversions throughout exercise.     Sean was also evaluated by Dr. Chintan Cabrera who did not detect any arrhythmias on her newly placed generator download, and did not make any recommendations in pacemaker or rhythm management.  He asked to see her again in 6 months.     My impression from that visit was Sean had improved since her last clinic visit.  She had improved symptoms of exercise intolerance, improved energy level, improved activity, and her palpitations had resolved. She was now exercising regularly and losing weight.  She still had poor exercise tolerance, however, and desaturated with exercise, which I suspected was intrapulmonary shunting.  I did not detect pulmonary edema on exam during exercise.  She had a progression in left ventricular dysfunction for which I found no etiology.  She continued to  have mild pulmonary valve stenosis with mild to moderate pulmonary insufficiency of her bioprosthetic valve.  Her right ventricular function had improved. She also had mild tricuspid valve stenosis, and moderate right atrial and left atrial dilatation which had increased.  Her recent Holter monitor has showed no ventricular ectopy, rather PACs, atrial couplets and triplets.  I felt that Sean's cardiac condition had improved with increasing diuresis and increasing exercise capacity and also weight loss.  I continued to be concerned by her diagnosis of constrictive pericarditis, her left ventricular dysfunction, and her intrapulmonary shunting.  I also felt that weight lifting was not the best exercise for her, and asked her to switch more to cardio training and less weight lifting.     Sean was instructed to increase her coenzyme Q10 to 200 mg by mouth twice a day, and stay on the same doses of her other medications.  Laboratory studies were ordered including a CBC, CMP, BNP, complement C3, CRP and a sedimentation rate, but were not obtained.   She was given a return to this clinic in 3 months time with an EKG and an echocardiogram.  SBE prophylaxis continued to be in order for all dental and surgical procedures.     Sean  was seen on October 4, 2018.  Since her previous clinic visit 4 months prior, she continued to improve and felt well.  She has continue to diet and lost another 14 lb in 4 months, in addition to the 12 lb she had lost at her last clinic visit.  She is exercising regularly which includes Pilates, ballet bar, light weight training, and cardio roping machine, in addition to once a week with a .  She denied shortness of breath, chest pain, palpitations or swelling.  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  She was taking her medicine regularly.  She continues to have a waqar IUD, which she was planning on removing in March and then attempting  pregnancy.  Her previous pacemaker download was October 3, 2018, the results of which were not yet available.     Sean was taking enalapril 5 mg in the morning and 2.5 mg in the evening, coenzyme Q10 200 mg p.o. b.i.d., metoprolol tartrate 50 mg p.o. b.i.d., Lipitor 20 mg p.o. q.day, Lasix 40 mg p.o. q.day to which she has a good diuretic response, levo cetirizine 5 mg p.o. q.day, Synthroid 137 mcg p.o. q.day, and Singulair 10 mg p.o. q.day.     Physical exam revealed a healthy-appearing and pleasant young woman in no acute distress.  Vital signs showed a height 175.3 cm or 5 ft 9 in, and weight of 109.6 kg or 241 lb 12 oz, which is a 14 lb weight decrease since her previous clinic visit in May, 2018.  Pulse was 86 beats per minute, pulse oximetry in room air 97%, and blood pressure in the right arm 107/68 mm of mercury.     Examination of the skin showed it to be pink and well perfused.  The lungs were clear.  Palpation of the precordium showed to be normal with a well-healed midline scar.  First heart sound was normal and 2nd heart sound was widely split.  There was a grade 2/6 systolic ejection murmur best heard at the left upper sternal border, but well over the precordium.  Diastole was clear.  The liver edge was at the right costal margin and improved.  Pulses were 2+ bilaterally. There was no peripheral edema.  The pacemaker was palpated in the abdomen.     EKG was obtained which showed a paced atrial rhythm at 68 beats per minute, right ventricular conduction delay, and T-wave inversions in leads I,II, AVF, V1 - V6, unchanged.     An echocardiogram was obtained which showed evidence for repair of tetralogy of Fallot, and a bioprosthetic valve in the pulmonary position.  M-mode parameters were as follows:  The aortic root measured 30 mm unchanged, left atrium 44 mm decreased slightly (46 mm), right ventricle 28 mm by m mode and 35 mm by  2D in long axis, unchanged.  The interventricular septum measured 10  and left ventricular posterior wall 11 mm, both normal.  The left ventricular internal dimension in diastole was 52 and systole 36 mm giving a calculated ejection fraction of 59%, he which has improved and is now normal (previously 47%).  The pulmonary valve leaflets were thickened with decreased mobility, with the posterior leaflet being frozen, as noted previously.  The pulmonary valve annulus was small at 16 mm.  The right atrium was dilated measuring 56 x 47 mm which had increased.  There was decreased opening of the tricuspid valve with tethering of the septal leaflet.  Both right and left ventricular function were judged subjectively to be within normal limits.  The ventricular septal defect patch was in proper position.  Doppler analysis using pulsed, continuous and color-flow:  No tricuspid insufficiency.  Peak gradient across the tricuspid valve was 11 peak and 6 mean mm of mercury, unchanged.  Peak gradient across the bioprosthetic pulmonary valve was 30 peak and 18 mean mm of mercury, slightly increased parentheses 25 mm of mercury peak).  Pulmonary insufficiency was present which was mild and improved.  No mitral insufficiency.  No aortic stenosis or aortic insufficiency.  The gradient across the mitral valve was 2 peak and 1 mean mmHg with E wave dominance suggestive of constrictive pericarditis.  Impression:  Tetralogy of Fallot status post placement of a bioprosthetic valve in the pulmonary position.  Normal right ventricular size and function. Normal left ventricular size and function which has returned to normal. Hypoplastic pulmonary annulus measuring 16 mm, with thickened leaflets and frozen posterior leaflet, with a 30 mm Hg peak gradient suggesting mild obstruction which has increased slightly, and mild pulmonary insufficiency which has improved.  Dilated right atrium which has increased to 56 x 47 mm, and dilated left atrium which is stable to slightly improved at 44 mm.  Decreased excursion of  the septal leaflet of the tricuspid valve leading to reduced flow and an 11 peak and 6 mean mmHg gradient, unchanged.  No gradient across mitral valve but E wave dominance, consistent with constrictive pericarditis.       An exercise stress test was obtained which showed that Sean exercised 7 min and 18 sec of the Sam protocol, 1 min and 18 sec into stage 3, which was below predicted for an adult, but improved from the previous study by about 30 sec.  Baseline heart rate was 63 beats per minute, increased to 159 beats per minute at peak exercise, before decreasing back to 75 beats per minute at the end of 7 min of recovery. Baseline blood pressure was 115/64 mm of mercury, increased to 140/77 mm of mercury 1 min after peak exercise, before decreasing back to 108/65 mm of mercury 7 min into recovery.  Saturation was 98% at baseline, and then decreased to 94% at peak exercise, before returning to 99% 1 min into recovery.  Patient remained fully saturated throughout the remainder of the study.  Baseline rhythm was sinus at 63 beats per minute, and patient continued in sinus rhythm throughout exercise and recovery.  Occasional unifocal PVCs were seen starting in stage II and III, which resolved in recovery.  There were no PACs.  Baseline EKG had negative T-waves in leads 1, 2, AVF, and V1 through V6.  During exercise, T-waves became flattened in leads 1 and 2 and less negative in leads AVF, V1 through V6.  At peak exercise, T-waves were upright in leads 1, and 2, flattened in AVF, and upright in V1 through V6.  During recovery, T-waves again became negative in leads 1, 2, and AVF, V1 through V6.  Impression:  Low level of exercise predicted for age, however an improvement from the previous study of 30 sec.  Normal blood pressure and her heart rate response to exercise.  Mild desaturations to 94% at peak exercise, which recovers within 1 min to 99%.  Occasional unifocal PVCs during exercise which resolved in  recovery, and no PACs, which is an improvement from the previous study.  Inverted T-waves in inferior and precordial leads which convert to positive during exercise, and then revert back to negative during recovery.     My impression from this visit was that Sean had improvement in cardiac function, rhythm, energy, and exercise tolerance.  I attributed this to an improvement in left ventricular function, improvement in right ventricular function, a decrease in weight, an increase in exercise training, and an improvement in arrhythmias.  She still has evidence for a thickened and poorly functioning bioprosthetic valve, however the gradient across it was only 30 mm of mercury peak, with mild pulmonary insufficiency, and her right ventricular function had improved to normal. I still felt that she would need a Serina valve placement in the near future.  She still had evidence for constrictive pericarditis as evidence by her dilated right and left atria, and Doppler flow pattern in the left ventricle, however, I saw an overall improvement in cardiac function leading to decreased symptoms.     Sean's reasons for improvement were probably multifactorial, and I felt no reason to change any of her management.  Therefore, Sean  was instructed to continue on the same doses of her medications.  She was to continue her successful diet and exercise program.  I contacted Dr. Cabrera to ask for the results of her recent pacemaker download.  I suspected that she and her fetus would tolerate a pregnancy in the future.  Sean was instructed to return to this clinic in about 3 months time, at which point she would have a dual appointment with electrophysiologist Dr. Cabrera and myself, with an EKG and an echocardiogram.  Laboratory work was ordered including CBC, CMP, BNP, thyroid function studies and lipid profile.  SBE prophylaxis was in order for dental and surgical procedures.          Laboratory work obtained on October  4th, 2018 showed a T4 normal at 9.6 mcg/dL, T3 at the lower limit normal 2.3 pg/mL, TSH at the lower limit of normal at 0.414 micro international units per mL, cholesterol was 119 mg/dL, triglycerides 59 mg per dL, HDL was low at 34 mg/dL, LDL 73.2 mg/dL.  C3 complement was now normal at 145 mg/dL, high sensitivity CRP was now normal at 2 point 7 5 mg per dL, and BNP was elevated at 178 pg/mL which had improved.  The CMP was normal except for an elevated total bilirubin of 1.8 mg/dL which had improved.  The CBC was normal with a hemoglobin of 12.4 grams/deciliter and hematocrit 38.5%.     Sean was seen in the [facility] on February 14, 2019.  Since her clinic visit 4 months prior, or near has been feeling well.  She is exercising, which consists doing some classes, rides a bicycle for 30 min, and works with a , and her exercise capacity has improved.  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 She reports a good energy level which has improved.  She denies chest pain, shortness of breath or palpitations.  She does notice swelling of her lower legs when working her 12 hr shifts, during which time she is on her feet quite a bit.     Sean is taking Lasix 40 mg p.o. q.day, to which she has a good diuretic response, metoprolol tartrate 50 mg p.o. b.i.d., enalapril 5 mg in the morning and 2.5 mg in the evening, coenzyme Q10 200 mg p.o. b.i.d., Synthroid 137 mcg p.o. q.day, Lipitor 20 mg p.o. q.day, cetirizine 10 mg p.o. q.day, fish oil and a multivitamin.  She has stopped taking Singulair.     Physical exam revealed an obese, pleasant and healthy-appearing young woman in no acute distress.  Vital signs showed a height of 5 ft 9 in or 175.3 cm, and weight of 112.4 kg or 247 lb 13 oz, which is a 3 kg weight increase since her previous clinic visit.  Blood pressure was 117/63 mm of mercury in the right arm, pulsed oximetry in room air  97%, and pulse was 71 beats per min.     Examination of the skin showed it to be pink and well perfused.  The lungs were clear.  Palpation of the precordium showed to be normal with a well-healed midline scar.  First heart sound was normal and 2nd heart sound was split.  There was a grade 2-3/6 systolic ejection murmur heard best at the left upper sternal border with minimal radiation.  There was also a grade 2/6 diastolic murmur heard at the left lower sternal border, and also probably a diastolic rumble.  The liver edge was 2 cm below the right costal margin, unchanged.  The pacemaker was palpated in the upper mid abdomen.  Pulses were 2+ bilaterally.  There was no peripheral edema.     An EKG was obtained which showed an atrially paced rhythm at 70 beats per minute, with a long PVR interval of 344 milliseconds.  There was an RV conduction delay, and negative T-waves in leads 1, 2, V5 and V6 which were unchanged.  Compared to the previous study, the patient is now atrially paced.     An echocardiogram was obtained which had very poor two-dimensional imaging, secondary to a different machine being use, and patient obesity.  Chamber sizes were made on 2 dimensional imaging as M-mode measurements were suboptimal.  The interventricular septum measured 10 and the left ventricular posterior wall 11 mm, no change.  The right ventricular dimension in the long axis view was 32 mm, improved (35 mm).  The left ventricular internal dimension diastole was 53 and in systole 40 mm giving a calculated ejection fraction of 49%, showing mild LV dysfunction which has decreased (previously 59%).  The left atrium was dilated measuring 45 mm, unchanged.  The aortic root measured 31 mm, unchanged.  The ascending aorta measured 25 mm.  The pulmonary valve was difficult to image, and the posterior leaflet was thickened and had decreased motion, and the pulmonary valve annulus was 22 mm.  In the four-chamber view, the left atrium measured  56 x 52 mm.  The right atrium measured 52 x 45 mm, unchanged.  The tricuspid valve was poorly seen.  Subcostal views could not be obtained.  The aortic arch was left-sided and unobstructed.  Doppler analysis using pulse, continuous and color-flow:  Mitral insufficiency was present which was mild, a new finding.  Pulmonary insufficiency was present which was mild with a end-diastolic gradient of 4 mm of mercury, unchanged.  The gradient across the bioprosthetic pulmonary valve was 37 mm of mercury peak, which has increased since the previous value (30 mm of mercury).  The gradient across the tricuspid valve was 13 peak and 6 mean mm of mercury, similar to previous value.  The E to a ratio across the mitral valve was 1.52.  Tricuspid insufficiency was present which was trivial.  No aortic stenosis or aortic insufficiency.  Impression:  Very poor imaging probably secondary to technical difficulties as well as patient obesity, leading to difficulty imaging and measuring all cardiac structures.  Tetralogy of Fallot status post pulmonary valve replacement.  Normal size right ventricle which has increased since the previous study, with subjectively normal right ventricular function.  Mildly decreased left ventricular function with ejection fraction 49%.  Significantly dilated left atrium and right atrium, unchanged.  Decreased motion and thickening of the posterior leaflet of the bioprosthetic pulmonary valve, with a 37 mm Hg gradient which has increased.  New mild mitral insufficiency.  Mild pulmonary insufficiency.  Mild to moderate tricuspid stenosis, unchanged.       Laboratory values were obtained which showed a normal CBC with a hemoglobin of 13 grams/deciliter and a hematocrit of 40%.  The free T4 was normal at 1.4, and the CMP was normal except for a mildly elevated bilirubin of 1.8, unchanged.  The BNP was mildly elevated at 156 pg/mL, unchanged.  The high density CRP was normal at 2.24.  The free T3, TSH,  vitamin-D and vitamin-B levels were pending.  These values subsequently showed free T3 was mildly decreased at 2.2 pg/mL, vitamin D was decreased at 16 ng/mL, cholesterol 131 mg/dL, HDL was decreased at 30.6 mg/dL, vitamin B12 normal at 923 pg/mL.  (She was subsequently placed on vitamin D 61507 units p.o. Q.week.)     Sean was also evaluated by electrophysiologist Dr. Chintan Cabrera.  Dr. Cabrera found that there were no new arrhythmias and the pacemaker was functioning well.  He did not recommend any changes.  Please see Dr. Cabrera note for more details.     My impression from this visit was that Sean may have had a decrease in left ventricular function the etiology of which is not evident, but the quality of the echocardiogram was so poor that I feel this needs to be repeated on a better quality machine.  Her right ventricular size and function are normal.  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  Her bioprosthetic pulmonary valve gradient has increased from 30-37 mm of mercury peak, and continues to have a posterior leaflet which moves poorly.  The previous inflammatory process that occurred last year has resolved, as evidenced by a return of her CRP to normal.  I wondered whether this participated in the development of her constrictive pericarditis, although she has another reason to have this from her epicardial AICD patch.     Sean desires to become pregnant within the next few months.  In terms of tolerating her pregnancy, her biggest risk factor is the constrictive pericarditis.  I still feel that the stenotic bioprosthetic pulmonary valve is contributing to her cardiac dysfunction, and that she should have a Serina valve placed prior to pregnancy.  I feel she will be at moderate risk for pregnancy but should be able to tolerated with careful monitoring from the Cardiology and Maternal-Fetal  services.     Despite these issues, Sean continues to improve clinically, as evidenced by improved energy and exercise tolerance.  I am attributing this to her weight loss and exercise training.  Therefore, Sean was instructed to stay on the same doses of her medications.  I have asked her to return to this clinic in 1 months time with an EKG, an echocardiogram on a better quality machine, and an exercise stress test.  Following that I will present her in conference for Serina valve placement prior to pregnancy.  I will also arrange for her to have a maternal fetal evaluation in the future.  (As mentioned above, she was also subsequently placed on vitamin D3 87401 units p.o. q.week).     Sean Baez was seen in the Adult with [facility] on April 4, 2019.  She is coming in today because the echocardiogram performed at her previous visit was inadequate, and another study is scheduled on a new machine.  Additionally, she is being assessed for suitability of pregnancy.  Additionally, an exercise stress test is scheduled for this visit.  Since her previous clinic visit in February, 2019, or near states that she has been feeling well.  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  She continues to exercise with a , does aerobic exercise for almost an hour and does some mild weight training.  She denies chest pain, palpitations, shortness of breath, swelling or dizziness.  Her father was recently diagnosed with diabetes mellitus.     Sean is currently taking metoprolol 50 mg p.o. b.i.d., enalapril 5 mg in the morning and 2.5 mg in the evening, Lasix 40 mg p.o. q.day, Synthroid 137 mcg p.o. q.day, BuSpar 10 mg p.o. b.i.d., fish oil, multivitamin, coenzyme Q10 200 mg p.o. b.i.d., cetirizine 10 mg PO q.day, atorvastatin 20 mg PO q.day, and vitamin D 03990 units p.o.  q.week.     Physical exam revealed an obese, pleasant young woman in no acute distress.  Vital signs showed a height of 175.3 cm or 5 ft 9 in, and weight of 112.8 kg or 248 lb 9 oz, unchanged.  Blood pressure in the right arm was 117/78 mm of mercury, pulse oximetry in room air 99% and pulse 95 beats per min.     Examination of the skin showed it to be pink and well perfused.  The lungs were clear.  Palpation of the precordium showed to be normal with a well-healed midline scar.  First heart sound was normal and 2nd heart sound was widely split.  There was a grade 3/6 harsh systolic ejection murmur heard best at the left upper sternal border, and well over the precordium.  There was also a 1/6 diastolic murmur heard along the left lower sternal border.  The liver edge was at the right costal margin, improved and the pacemaker was palpated in the abdomen.  Pulses were 2+ bilaterally. There was no peripheral edema.     An EKG was obtained which showed sinus rhythm with first-degree block at 80 beats per minute, right ventricular hypertrophy, and T-wave inversions in leads 1 2 and V1 suggestive of ischemia, no change.     An echocardiogram was obtained which showed tetralogy of Fallot status post repair with a bioprosthetic valve in the pulmonary position.  M-mode parameters were as follows:  The aortic root measured 32 mm (Z 0.9) unchanged and the left atrium measured 44 mm (Z 0.9) unchanged.  The aortic annulus measured 23 mm (Z 1.3).  The interventricular septum measured 10 mm (Z-0.5) and the left ventricular posterior wall 10 mm (Z-0.2), both unchanged.  The left ventricular internal dimension diastole measured 53 mm and in systole 38 mm, giving a calculated ejection fraction of 54%.  The ejection fraction was borderline low but has improved since the previous study (LV EF 49%).  The right ventricle measured 35 mm (Z 0.6) increased from the previous study (32 mm).  The tricuspid valve domed in diastole with what  appear to be septal leaflet for shortening.  The tricuspid valve annulus measured 28 mm and the mitral valve annulus 34 mm.  A bioprosthetic valve was in the pulmonary position with the posterior leaflet frozen and the anterior leaflet moving.  The right pulmonary artery measured 12 mm proximally (Z-1.4) and 18 mm distally.  The left pulmonary artery measured 15 mm proximally (Z-0.6) and 20 mm distally.  Right ventricular function was judged subjectively to be normal.  The right atrium was dilated and measured 57 x 52 mm which is increased since the previous study (52 x 45 mm).  The atrial septum is intact.  The inferior vena cava drain normally to the right atrium and measured 15 mm in diameter.  The right superior vena cava drain normally to the right atrium.  The aortic arch was left-sided and unobstructed.  Doppler analysis using pulsed, continuous and color-flow:  Tricuspid stenosis was present with an 11 peak and 6 mean mm Hg gradient, mild and unchanged.  Tricuspid insufficiency was present which was trace, with a gradient of 36 mm of mercury, indicating mildly elevated right ventricular pressures which has increased since the previous study (23 mm Hg).  Pulmonary insufficiency was present which was mild.  The gradient across the bioprosthetic pulmonary valve was 44 mm of mercury peak and 24 mm of mercury mean indicating mild-to-moderate obstruction which has increased since the previous study (37 mm Hg peak).  Mitral insufficiency was present which was mild, unchanged.  No aortic stenosis or aortic insufficiency.  Bidirectional flow in the patent veins indicated right atrial hypertension.  Impression:  Tetralogy of Fallot status post complete repair and placement of a bioprosthetic valve in the pulmonary position.  Good right ventricular function.  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  Mild  pulmonary insufficiency which is unchanged.  Normal branch pulmonary arteries.  Dilated right atrium measuring 57 x 52 mm which has increased. Borderline low left ventricular function which has improved to an ejection fraction of 54%.  Dilated left atrium measuring 44 mm, unchanged.  Mild tricuspid stenosis with 11 peak and 6 mean mm Hg gradient, unchanged.     An exercise stress test was obtained which showed that [place] exercise 8 min and 1 sec of the Sam protocol, demonstrating low level of exercise for age, but improved since the previous study. Baseline heart rate was 67 beats per minute, increased to 142 beats per minute at peak exercise (patient on beta-blocker), before decreasing back to 76 beats per minute after 7 min of recovery. Baseline blood pressure was 110/73 mm of mercury, increased to 146/84 mm of mercury 1 min into recovery, before decreasing back to 116/78 mm of mercury after 7 min of recovery.  Patient remained fully saturated between 98 and 100% throughout exercise and recovery.  Baseline rhythm was sinus rhythm.  Occasional to frequent unifocal PVCs started in stage I and continued into the beginning of stage II.  No PVCs were present during the latter part of stage II, the first 2 min of stage III, and all of recovery.  There were baseline T-wave inversions in leads I and II, and T-wave flattening in leads AVF, V5 and V6.  These changes did not progress throughout exercise and there were no ST segment depressions.  Impression:  Low level of exercise for age but improved since the previous study.  Adequate heart rate response to exercise on beta blocker and adequate blood pressure response to exercise.  Occasional to frequent unifocal PVCs during stage I and II which are than suppressed by exercise and do not recur.  Baseline T-wave inversions in leads I and II, and T-wave flattening in leads AVF, V5 and V6 which do not progress, nor are there any ST segment depressions with exercise.   Normal arterial saturations.     My impression from this visit is that Sean has a poorly functioning bioprosthetic pulmonary valve that should be replaced prior to her pregnancy.  She also has constrictive pericarditis as evidence today with her dilated right atrium and left atrium, which is the larger threat to her pregnancy, however she has enough risk factors that I feel any hemodynamic abnormalities that can be corrected at a relatively low risk prior to pregnancy should be performed.  My impression is that Sean's overall cardiac condition has improved, probably secondary to her exercise regimen and medical management, rendering her a better candidate for pregnancy.  She has mild tricuspid stenosis which is unchanged.  Her pacemaker is functioning well and her rhythm is controlled on her current dose of metoprolol.     For the above reasons, I will present Steven in cardiac catheterization/surgery Conference for placement of a Serina valve prior to pregnancy.  I have also referred her for a high risk maternal fetal evaluation. Sean was instructed to continue on the same doses of these medications.  She was educated that the enalapril must be stopped prior to pregnancy and we will replace it with a different vaso dilator.  She should continue with her exercise regimen and attempt to lose more weight.  I have asked her to make another appointment in this clinic in about 4 months during our pacemaker Clinic, where her pacemaker and rhythm will be evaluated, and at that time we will obtain some laboratory values, including a repeat vitamin D level.       Further disposition for the cardiac status of Sean Baez will be determined following the discussion of her case in catheterization conference, her evaluation by the high risk maternal fetal team, and her repeat evaluation in pacemaker clinic in 4 months.  SBE prophylaxis continues to be in order for dental and surgical procedures.    Sean was  subsequently presented to the Pediatric Cardiology, Cardiac surgery conference where it was agreed that she would benefit from a Serina valve placement.    Sean Baez was seen in the Adult with Congenital Heart Disease Clinic on July 29, 2019.  Since her previous clinic visit in April, 2019, Sean has generally been feeling well.  She has been studying hard for her internal medicine boards in September.  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  She continues working out 3 times a week on her off weeks, which includes lifting some weights, doing cardio training, and working with a .  She denies chest pain or shortness of breath.  She has rare palpitations of 3 beats in duration, which he notices while lying down about once a week.  She notices mild ankle swelling at the end of her shift.  Her last remote pacemaker interrogation was in May, 2019, which showed no episodes of tachyarrhythmias.  Sean reports a good energy level which has not changed since her previous visit.    Sean is currently taking metoprolol tartrate 50 mg p.o. b.i.d., enalapril 5 mg in the morning and 2.5 mg at night, coenzyme Q10 100 mg p.o. b.i.d., Synthroid 137 mcg p.o. q.day parentheses about to be decreased to 125 mcg a day based on laboratory work), aspirin 81 mg p.o. q.day, furosemide 40 mg p.o. q.day, atorvastatin 20 mg p.o. q.day, BuSpar 10 mg p.o. b.i.d., singular 10 mg p.o. q.day (restarted for allergies) fish oil, multivitamin, and vitamin-D 49654 units p.o. q.week.    Physical exam revealed a healthy-appearing, pleasant, obese young woman in no acute distress.  Vital signs showed a height of 175.3 cm or 5 ft 9 in and weight of 112.9 kg or 249 lb, unchanged.  Blood pressure in the right arm was 114/79 mm of mercury, pulse oximetry in room air 96%, and pulse 87 beats per min.    Examination of the skin showed it to be pink and well perfused.  The lungs were clear.   Palpation of the precordium was normal, with a well-healed midline scar and the pacemaker palpated in the upper abdomen.  First heart sound was normal and 2nd heart sound was widely split.  There was a grade 3/6 somewhat harsh systolic ejection murmur heard best at the left midsternal border and heard well along the left sternal border.  Diastole was clear which has improved (diastolic murmur heard previously).  The liver edge was 1 cm below the right costal margin which has improved.  Pulses were 2+ bilaterally.  There was no peripheral edema.    EKG showed paced atrial rhythm at 71 beats per minute, with right ventricular hypertrophy, and T-wave inversions in leads 1, 2, V5 and V6 suggestive of ischemia, and the EKG is unchanged.    An echocardiogram was obtained which showed evidence for repair of tetralogy of Fallot, with a bioprosthetic valve in the pulmonary position.  M-mode parameters were as follows:  The aortic root measured 31 mm, unchanged and the left atrium was dilated and measured 45 mm, also unchanged.  The aortic annulus was normal at 21 mm.  The interventricular septum measured 11 and the left ventricular posterior wall 11 mm both of which were normal.  The left ventricular internal dimension in diastole was 54 mm and in systole 38 mm giving a calculated ejection fraction of 55%, indicating left ventricular function at the lower limit of normal, unchanged.  A bioprosthetic valve was present in the pulmonary position with an annulus measuring 17 mm, small for the patient's size.  Both leaflets were thickened, the posterior leaflet was frozen, and the anterior leaflet had decreased excursion.  The right pulmonary artery was not well seen and the left pulmonary artery measured 18 mm in diameter.  The right atrium was dilated and measured 58 x 57 mm in diameter which has increased (previously 57 x 52 mm in diameter).  The tricuspid leaflets were not well seen but did dome in diastole and the tricuspid  valve annulus was 26 mm, unchanged.  The mitral valve had normal leaflet motion and the annulus was 33 mm.  The ventricular septal defect patch was in proper position covering a previous malaligned defect.  The atrial septum was intact.  The inferior and superior vena cava drain normally to the right atrium.  The aortic arch was left-sided and unobstructed.  Doppler analysis using pulse, continuous and color-flow:  Turbulence was detected across the tricuspid valve with a gradient of 12 peak and 6 mean mm of mercury indicating mild to moderate tricuspid valve stenosis, unchanged.  The gradient across the bioprosthetic pulmonary valve was 42 peak and 22 mean mm of mercury indicating mild change pulmonary insufficiency was present which was moderate as indicated by retrograde flow in the main pulmonary artery, and had increased.  The pulmonary artery diastolic pressure was 5 mm of mercury.  Tricuspid insufficiency was present which was physiologic with a gradient of 32 mm of mercury.  Mitral insufficiency was trace.  There was no aortic stenosis or aortic insufficiency.  Retrograde flow was present in the hepatic veins, unchanged.  Impression:  Tetralogy of Fallot status post pulmonary valve replacement.  Stenotic and poorly functioning bioprosthetic pulmonary valve with frozen posterior leaflet, 42 peak and 22 mean mm Hg gradient, and pulmonary insufficiency which has increased to moderate.  Normal right ventricular size and function.  Low normal left ventricular function, unchanged.  Significantly dilated right atrium which has increased.  Dilated left atrium stable.  Mild tricuspid valve stenosis with doming leaflets, unchanged.  Unobstructed left aortic arch.    Sean was sent for laboratory work, which at the time of this dictation showed:  CBC had hemoglobin 13.7 and hematocrit 41.9%, with a mildly elevated platelet count of [id number]; BNP was mildly elevated at 141 pg/mL, improved.  The comprehensive metabolic  profile, vitamin-D level and lipid profile are pending.    My impression from this visit, as it has been in the past, is that Sean requires a pulmonary valve replacement for increasing pulmonary valve stenosis and pulmonary valve insufficiency. Sean will not follow the standard criteria for pulmonary valve replacement, since I believe her right ventricle is not able to dilate secondary to constrictive pericarditis.  Hopefully Sean can have a Serina valve replacement, and I feel that this can wait until after she has taken her internal medicine boards, which will be in September.  Her tricuspid valve stenosis is not significant enough to require valve replacement, in my opinion.  I believe her constrictive pericarditis is stable, and her symptoms have certainly improved with diuresis, exercise and weight loss.  She needs repeat lab work to monitor her vitamin-D and cholesterol levels after treatment.  In my opinion, once she has a well-functioning pulmonary valve, that she will be a reasonable candidate for pregnancy.  I will refer her to the high risk maternal fetal team for evaluation following her internal medicine boards.  Until then, I have instructed Sean to remain on the same doses of her medications.  It is noted that her Synthroid has been decreased. Sean was instructed to return to this clinic during our pacemaker clinic, with an EKG, echocardiogram, and pacemaker evaluation.    Further disposition for the cardiac status of  Sean Baez will be determined following the results of the remainder of her laboratory work, her rediscussion in surgery and catheterization conference, her Serina valve replacement, and her evaluation by the maternal fetal specialist.  SBE prophylaxis continues to be in order for dental and surgical procedures.    Thank you very much for allowing up to participate the care of your patient.       Sincerely      Chantel Saleh       All Flowsheet Templates

## 2019-07-30 ENCOUNTER — TELEPHONE (OUTPATIENT)
Dept: MATERNAL FETAL MEDICINE | Facility: CLINIC | Age: 34
End: 2019-07-30

## 2019-07-30 NOTE — TELEPHONE ENCOUNTER
OMAIRA for patient- calling to schedule pre-pregnancy consult that Dr. Branham ordered for her. Return phone number and clinic hours given.

## 2019-08-23 ENCOUNTER — PATIENT MESSAGE (OUTPATIENT)
Dept: ADMINISTRATIVE | Facility: OTHER | Age: 34
End: 2019-08-23

## 2019-08-23 ENCOUNTER — TELEPHONE (OUTPATIENT)
Dept: PEDIATRIC CARDIOLOGY | Facility: CLINIC | Age: 34
End: 2019-08-23

## 2019-08-23 NOTE — TELEPHONE ENCOUNTER
Left message for patient reminding of her scheduled device check on Monday . Informed they can send anytime before Monday

## 2019-08-26 ENCOUNTER — CLINICAL SUPPORT (OUTPATIENT)
Dept: PEDIATRIC CARDIOLOGY | Facility: CLINIC | Age: 34
End: 2019-08-26
Attending: PEDIATRICS
Payer: COMMERCIAL

## 2019-08-26 DIAGNOSIS — I47.20 VT (VENTRICULAR TACHYCARDIA): ICD-10-CM

## 2019-08-26 DIAGNOSIS — Q21.3 TOF (TETRALOGY OF FALLOT): ICD-10-CM

## 2019-08-26 DIAGNOSIS — Q24.9 ADULT CONGENITAL HEART DISEASE: ICD-10-CM

## 2019-08-26 PROCEDURE — 93295 DEV INTERROG REMOTE 1/2/MLT: CPT | Mod: S$GLB,,, | Performed by: PEDIATRICS

## 2019-08-26 PROCEDURE — 93295 CV ICD REMOTE PEDIATRICS (CUPID ONLY): ICD-10-PCS | Mod: S$GLB,,, | Performed by: PEDIATRICS

## 2019-08-26 PROCEDURE — 93296 REM INTERROG EVL PM/IDS: CPT | Mod: S$GLB,,, | Performed by: PEDIATRICS

## 2019-08-26 PROCEDURE — 93296 CV ICD REMOTE PEDIATRICS (CUPID ONLY): ICD-10-PCS | Mod: S$GLB,,, | Performed by: PEDIATRICS

## 2019-08-28 LAB
AV DELAY - LONGEST: 180 MSEC
BATTERY VOLTAGE (V): 3.01 V
CHARGE TIME (SEC): 3.8 SEC
ERI (V): 2.73 V
HV IMPEDANCE (OHM): 52 OHM
IMPEDANCE RA LEAD (NATIVE): 703 OHMS
IMPEDANCE RA LEAD: 608 OHMS
OHS CV DC PP MS1: 0.4 MS
OHS CV DC PP MS2: 0.4 MS
OHS CV DC PP V1: 1.5 V
OHS CV DC PP V2: 1.5 V
P/R-WAVE RA LEAD (NATIVE): 6.9 MV
P/R-WAVE RA LEAD: 1.1 MV
PV DELAY - LONGEST: 150 MSEC
THRESHOLD MS RA LEAD (NATIVE): 0.4 MS
THRESHOLD MS RA LEAD: 0.4 MS
THRESHOLD V RA LEAD (NATIVE): 1 V
THRESHOLD V RA LEAD: 0.88 V

## 2019-09-05 ENCOUNTER — INITIAL CONSULT (OUTPATIENT)
Dept: MATERNAL FETAL MEDICINE | Facility: CLINIC | Age: 34
End: 2019-09-05
Payer: COMMERCIAL

## 2019-09-05 VITALS
DIASTOLIC BLOOD PRESSURE: 72 MMHG | SYSTOLIC BLOOD PRESSURE: 100 MMHG | BODY MASS INDEX: 37.02 KG/M2 | WEIGHT: 250.69 LBS

## 2019-09-05 DIAGNOSIS — Q24.9 CARDIAC ARRHYTHMIA DUE TO CONGENITAL HEART DISEASE: Primary | ICD-10-CM

## 2019-09-05 DIAGNOSIS — Q21.3 TETRALOGY OF FALLOT: ICD-10-CM

## 2019-09-05 DIAGNOSIS — E03.9 HYPOTHYROIDISM, UNSPECIFIED TYPE: ICD-10-CM

## 2019-09-05 DIAGNOSIS — Z31.69 ENCOUNTER FOR PRECONCEPTION CONSULTATION: ICD-10-CM

## 2019-09-05 PROCEDURE — 3008F BODY MASS INDEX DOCD: CPT | Mod: CPTII,S$GLB,, | Performed by: OBSTETRICS & GYNECOLOGY

## 2019-09-05 PROCEDURE — 3008F PR BODY MASS INDEX (BMI) DOCUMENTED: ICD-10-PCS | Mod: CPTII,S$GLB,, | Performed by: OBSTETRICS & GYNECOLOGY

## 2019-09-05 PROCEDURE — 99205 OFFICE O/P NEW HI 60 MIN: CPT | Mod: S$GLB,,, | Performed by: OBSTETRICS & GYNECOLOGY

## 2019-09-05 PROCEDURE — 99205 PR OFFICE/OUTPT VISIT, NEW, LEVL V, 60-74 MIN: ICD-10-PCS | Mod: S$GLB,,, | Performed by: OBSTETRICS & GYNECOLOGY

## 2019-09-05 PROCEDURE — 99999 PR PBB SHADOW E&M-EST. PATIENT-LVL III: CPT | Mod: PBBFAC,,, | Performed by: OBSTETRICS & GYNECOLOGY

## 2019-09-05 PROCEDURE — 99999 PR PBB SHADOW E&M-EST. PATIENT-LVL III: ICD-10-PCS | Mod: PBBFAC,,, | Performed by: OBSTETRICS & GYNECOLOGY

## 2019-09-05 NOTE — LETTER
September 7, 2019      Chantel Saleh MD  [address]  [address]  [facility] [address]           [facility] [address]  [address] [place] [facility] [address]  Phone: [phone]          Patient: Sean Baez   MR Number: [id number]   YOB: 1985   Date of Visit: 9/5/2019       Dear Dr. Chantel Saleh:    Thank you for referring Sean Baez to me for evaluation. Attached you will find relevant portions of my assessment and plan of care.    If you have questions, please do not hesitate to call me. I look forward to following Sean Baez along with you.    Sincerely,    Jammie Moreno MD    Enclosure  CC:  No Recipients    If you would like to receive this communication electronically, please contact [email or web address] or [phone] to request more information on [employer] access.    For providers and/or their staff who would like to refer a patient to [facility], please contact us through our one-stop-shop provider referral line, [facility], at [phone].    If you feel you have received this communication in error or would no longer like to receive these types of communications, please e-mail [email or web address]

## 2019-09-07 NOTE — PROGRESS NOTES
The patient is a 34-year-old  who would be at least 35 at delivery if she conceives who presents for pre conception consultation due to tetralogy of Fallot status post repair, hypothyroidism, AICD present, constrictive pericarditis, pulmonary insufficiency and stenosis status post pulmonary valve replacement.  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  She has been  for approximately a year and a half.  She denies any significant symptoms except she did note on her honeymoon approximately a year ago that she did have some symptoms of shortness of breath that she believes were likely related to some pulmonary edema. I did review her notes from Dr. Carpenter and her catheterization notes.  Her recent note from Dr. Carpenter indicates that pulmonary valve replacement is recommended prior to conception.  It appears that the pulmonary valve is small for her and the gradient is increasing although not currently at the severe range.  The patient reported that she had a repair of her tetralogy of Fallot with VSD repair in  and a 2nd surgery in  to replace the stenotic pulmonary valve. 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 The pacemaker is on continuously. The defibrillator fired twice in  when exercising but it was then adjusted to account for exercise.  She has constrictive pericarditis due to the patch of the AICD which needs to remain and she does not have a window.  She was supposed to have a valve repair last year but the gradient was not high enough and they did not think she needed at that time.  Now they believe that she will need repeat placement valve.  They are interested in doing a Serina replacement with a non mechanical valve. It would be porcine or bovine.    Past medical history:  Sinus node dysfunction, tetralogy of Fallot, repaired, previous ventricular tachycardia  resolved, hypothyroidism (never hyper), constrictive pericarditis  Past surgical history:  Open heart surgery in 1987 for VSD repair, surgery in 2005 for a stenotic pulmonary valve replacement and at that time she was having a Murtaza tachyarrhythmia and had a pacemaker and defibrillator placed, bunionectomy x2, generator replacement of pacemaker in 2011/ 2018  Social:  Denies drugs or smoking, drinks alcohol socially  Family history of birth defects:  None she is only family member with a cardiac defect and this was an isolated defect.  She is currently using the Mirena for contraception  Medications:  Lipitor 20 mg (stop prior to conception), coenzyme Q 200 mg b.i.d (ok to continue limited information)., Vasotec 7.5 mg daily (stop prior to conception), Lasix 40 mg daily (ok to continue, but electrolytes need monitored), Xyzal 5 mg daily (ok to continue), Synthroid 137 mcg daily (ok to continue), vitamin-D 18640 units weekly (adjust dosing for pregnancy), Toprol XL 50 mg b.i.d. (ok to use but increased risk of IUGR)  She denies any history of hypertension but these are uses maintenance medications    Blood pressure is 100/72  Cr 0.9, normal ast and alt, baseline increase in BNP, plt 367, hgb 13.2    From Dr. Carpenter's last not 7/29/19:    EKG showed paced atrial rhythm at 71 beats per minute, with right ventricular hypertrophy, and T-wave inversions in leads 1, 2, V5 and V6 suggestive of ischemia, and the EKG is unchanged.     An echocardiogram was obtained which showed evidence for repair of tetralogy of Fallot, with a bioprosthetic valve in the pulmonary position.  M-mode parameters were as follows:  The aortic root measured 31 mm, unchanged and the left atrium was dilated and measured 45 mm, also unchanged.  The aortic annulus was normal at 21 mm.  The interventricular septum measured 11 and the left ventricular posterior wall 11 mm both of which were normal.  The left ventricular internal dimension in diastole  was 54 mm and in systole 38 mm giving a calculated ejection fraction of 55%, indicating left ventricular function at the lower limit of normal, unchanged.  A bioprosthetic valve was present in the pulmonary position with an annulus measuring 17 mm, small for the patient's size.  Both leaflets were thickened, the posterior leaflet was frozen, and the anterior leaflet had decreased excursion.  The right pulmonary artery was not well seen and the left pulmonary artery measured 18 mm in diameter.  The right atrium was dilated and measured 58 x 57 mm in diameter which has increased (previously 57 x 52 mm in diameter).  The tricuspid leaflets were not well seen but did dome in diastole and the tricuspid valve annulus was 26 mm, unchanged.  The mitral valve had normal leaflet motion and the annulus was 33 mm.  The ventricular septal defect patch was in proper position covering a previous malaligned defect.  The atrial septum was intact.  The inferior and superior vena cava drain normally to the right atrium.  The aortic arch was left-sided and unobstructed.  Doppler analysis using pulse, continuous and color-flow:  Turbulence was detected across the tricuspid valve with a gradient of 12 peak and 6 mean mm of mercury indicating mild to moderate tricuspid valve stenosis, unchanged.  The gradient across the bioprosthetic pulmonary valve was 42 peak and 22 mean mm of mercury indicating mild change pulmonary insufficiency was present which was moderate as indicated by retrograde flow in the main pulmonary artery, and had increased.  The pulmonary artery diastolic pressure was 5 mm of mercury.  Tricuspid insufficiency was present which was physiologic with a gradient of 32 mm of mercury.  Mitral insufficiency was trace.  There was no aortic stenosis or aortic insufficiency.  Retrograde flow was present in the hepatic veins, unchanged.  Impression:  Tetralogy of Fallot status post pulmonary valve replacement.  Stenotic and poorly  functioning bioprosthetic pulmonary valve with frozen posterior leaflet, 42 peak and 22 mean mm Hg gradient, and pulmonary insufficiency which has increased to moderate.  Normal right ventricular size and function.  Low normal left ventricular function, unchanged.  Significantly dilated right atrium which has increased.  Dilated left atrium stable.  Mild tricuspid valve stenosis with doming leaflets, unchanged.  Unobstructed left aortic arch.     Sean was sent for laboratory work, which at the time of this dictation showed:  CBC had hemoglobin 13.7 and hematocrit 41.9%, with a mildly elevated platelet count of 575686; BNP was mildly elevated at 141 pg/mL, improved.  The comprehensive metabolic profile, vitamin-D level and lipid profile are pending.     My impression from this visit, as it has been in the past, is that Sean requires a pulmonary valve replacement for increasing pulmonary valve stenosis and pulmonary valve insufficiency. Sean will not follow the standard criteria for pulmonary valve replacement, since I believe her right ventricle is not able to dilate secondary to constrictive pericarditis.  Hopefully Sean can have a Serina valve replacement, and I feel that this can wait until after she has taken her internal medicine boards, which will be in September.  Her tricuspid valve stenosis is not significant enough to require valve replacement, in my opinion.  I believe her constrictive pericarditis is stable, and her symptoms have certainly improved with diuresis, exercise and weight loss.  She needs repeat lab work to monitor her vitamin-D and cholesterol levels after treatment.  In my opinion, once she has a well-functioning pulmonary valve, that she will be a reasonable candidate for pregnancy.  I will refer her to the high risk maternal fetal team for evaluation following her internal medicine boards.  Until then, I have instructed Sean to remain on the same doses of her medications.  It  is noted that her Synthroid has been decreased. Sean was instructed to return to this clinic during our pacemaker clinic, with an EKG, echocardiogram, and pacemaker evaluation.     Further disposition for the cardiac status of  Sean Baez will be determined following the results of the remainder of her laboratory work, her rediscussion in surgery and catheterization conference, her Serina valve replacement, and her evaluation by the maternal fetal specialist.  SBE prophylaxis continues to be in order for dental and surgical procedures.  Assessment and plan:    1.  Maternal cardiac defect with complex cardiac issues:  I discussed with the patient that I would not recommend pregnancy at this point.  I would recommend that she have her pulmonary valve replaced but not with a mechanical valve and have a repeat echocardiogram and be stable for a number of months with a follow-up repeat echocardiogram before considering if she would qualify for conception.  After the valve is replaced and noted to be stable, she should be referred for pre conception consultation to see if maternal fetal medicine feels this pregnancy is a reasonable option.  I discussed with her with her current pulmonary valve situation while pulmonary stenosis is typically tolerated, the severity of her pulmonary stenosis could lead to significant complications with pregnancy and right heart dysfunction.  Her ejection fraction does vary from below normal in the 40s to in the normal range at just about 55%.  If her ejection fraction has dropped below 55% this could increase her risk of complications with pregnancy as well. I also discussed with her that the constrictive pericarditis is also a concern.  She reports this is due to the ICD patch.  As she does not have a window,the constrictive pericarditis would be anticipated to possibly worsen with pregnancy and lead to increased symptoms.  I also discussed with her that I have concerns about her  having the pacemaker with a history of the tachy-jessy syndrome.  Given her pacemaker is located in her upper abdomen this could have some affect with pregnancy and also if a  is needed.  There would need to be a plan in place for how this is turned off or adjusted with pregnancy. I also discussed with her that symptoms may increase with pregnancy and that her symptoms could be worsened during pregnancy. It is  difficult to given exact estimation of risk of cardiac event.  I would suffice it to say it is likely 15-20% with her current status and that there would be an increased risk of maternal morbidity and mortality. There is also a higher likelihood of early delivery, prolonged hospitalization, and fetal demise.  If she is open to it, I would suggest that she consider pursuing other options such as adoption or surrogacy.   I did discuss with her that her cardiac function would need to be improved and  the valve replaced ideally before considering any in-vitro procedure. It is reasonable for her to pursue a consultation with reproductive endocrinology to obtain all the information relevant to pregnancy.  Maternal fetal medicine would be happy to see her after valve replacement to assess if we feel that it would be reasonable for her to pursue pregnancy at that time.  She will always have some risk associated with pregnancy and it will be higher than someone who has a tetralogy of Fallot repair without other complications. She will always have a higher risk of morbidity and mortality than an individual without her history. I recommend she continue follow-up with her cardiologist and continue reliable contraception.  We discussed that it would likely be 6 months to a year before she would be referred back to  Maternal-Fetal Medicine to assess for pregnancy so that she is aware of the delay in conception and the potential risks associated with conception with advancing age.   Patient would also need to stop  the Vasotec and Lipitor prior to conception and be stable on new medications prior to conception as these are not compatible with pregnancy. Vitamin D dosing in pregnancy is typically 1000 to 2000 IU daily. Recommend primary provider check baseline assessment of urine protein. Patient does not appear to need anticoagulation at this time. Baby asa daily would be recommended during pregnancy if no concerns from cardiology perspective. Patient advised that pregnancy may not be advised if there are concerns after valve replacement. If there is pulmonary HTN, pregnancy would not be advised. RVSP not listed in this note (prior 23 on cath 50 and 19) Last note indicates some tricuspid issues but not thought to be significant.  Fetal echo would be needed during pregnancy. Risk of fetal cardiac anomaly is approximately 3-5% if TOF was truly isolated which is increased compared to an individual without a cardiac defect.    2.  Advanced maternal age:  I discussed with her the increased risk of adverse outcomes associated with advanced maternal age.  These are include but are not limited to diabetes, hypertension, miscarriage, ectopic pregnancy and other complications.  The also include an increased risk of chromosomal abnormalities which increases is age increases.  She is aware of the options of genetic screening and diagnostic testing for aneuploidy.  If she were to pursue in vitro fertilization she may wish to consider donor depending on the age at which she pursues pregnancy    3.  Hypothyroidism:  We discussed that hypothyroidism is managed differently during pregnancy with different target ranges.  The patient's TSH should be kept within the range defined by ACOG (1st trimester 0.1 to 2.5 mIU/L) (2nd trimester 0.2 to 3.0 mIU/L) (3rd trimester 0.3 to  3.0 mIU/L). Adverse outcomes have been associated with uncontrolled thyroid disease. Patients with symptomatic hypothyroidism have a  higher incidence of preeclampsia,  placental abruption, IUGR and fetal demise. Women who become euthyroid have significantly less  complications than those who remain hypothyroid.     4. The goal of a preconception counseling visit is to address conditions, both those previously recognized as well as unrecognized, that may affect a future pregnancy. For some conditions, interventions are possible that can improve future pregnancy outcome, while for other conditions, recognition allows a better assessment of pregnancy-associated risk.  In addition to these discussions and evaluations, you can improve your chances of a successful pregnancy outcome by implementing other practices into your daily routine. Because many women do not realize they are pregnant until several weeks after conception, it is best to make as many of the changes as possible when attempting pregnancy.     · Begin folic acid supplementation, at least 0.4 mg per day, at least 1 month prior to attempting pregnancy and continue through the first trimester. Most prenatal vitamins contain 1 mg and therefore should suffice. Folic acid reduces the risk of neural tube defects (spina bifida) by 60-75%.  · A regular exercise program, consisting of at least 30 minutes of activity at least 3 to 5 times a week will improve your overall cardiovascular conditioning and may lower your risk of gestational diabetes and hypertension.  · Work to achieve a body weight that is within the normal range. Women who are obese as well as those who are extremely thin are at increased risk for pregnancy complications including fetal growth abnormalities and  delivery.  · Avoid extreme diet practices. Diets that restrict intake of certain food groups often do not provide balanced nutrition.   · Certain types of fish may be contaminated with mercury, a substance that may harm a developing fetus. Shark, swordfish, jeanne mackerel, and tilefish have high levels of mercury and should be avoided. Follow FDA  recommendations regarding FISH consumption.  · Prevent HIV infection--use safe sex practices if any of your activities could put you at risk for acquiring HIV or other sexually transmitted diseases  · Avoid alcohol, tobacco, and illicit drugs before and during pregnancy. Tobacco lowers the rate of fertilization and can cause placental dysfunction. Alcohol is the most common environmental cause of mental retardation in the [place] Although there is no reason for concern if you have a couple of drinks before you realize you are pregnant, there is no clear answer regarding how much alcohol is safe during pregnancy. Therefore, it is best to avoid alcohol during pregnancy.  · Optimize your overall health by maintaining good control of any medical conditions you have such as hypertension, asthma, diabetes, thyroid disease.  Once you are pregnant, seek early prenatal care to allow for accurate pregnancy dating and confirmation of a normal ongoing gestation    Primary ob should offer the patient expanded carrier screening, as well as confirm rubella and varicella immunity and negative HIV status.  Obesity can also be associated with adverse pregnancy outcomes and would recommend ideally weight loss if able.    In summary, I do not recommend the patient conceive at this time. This can be reassessed after valve replacement but advised patient that may not be able to recommend pregnancy in the future as well-will depend on follow up. See note above. Recommend cardiology clarify if patient has any evidence of pulmonary HTN as this would be contraindication to pregnancy unless they felt valve replacement may improve this. Cardiology was messaged.      80 min was spent face-to-face time with greater than half that time spent in counseling and coordination of care.    Addendum: patient's calcium normal. Patient did not mention any concerns of 22q11 deletion syndrome. Recommend referring provider confirm this is not a concern as  recurrence risk of 22q11 deletion to offspring would be 50%. Also, total bilirubin was elevated. Recommend primary service investigate etiology.

## 2019-09-09 ENCOUNTER — DOCUMENTATION ONLY (OUTPATIENT)
Dept: MATERNAL FETAL MEDICINE | Facility: CLINIC | Age: 34
End: 2019-09-09

## 2019-09-09 ENCOUNTER — PATIENT MESSAGE (OUTPATIENT)
Dept: MATERNAL FETAL MEDICINE | Facility: CLINIC | Age: 34
End: 2019-09-09

## 2019-09-09 NOTE — PROGRESS NOTES
Per Dr. Carpenter:    Sean had mild pulmonary hypertension measured on her catheterization from April, 2018, with an RPA pressure  40/14 mean  25 mmHg.     On her echocardiogram in July, 2019, there was only evidence mild pulmonary hypertension (peak pressure 32 mm of mercury), however this may be inaccurate in the presence of pulmonary valve stenosis.  Therefore, we need to remeasure it by echocardiogram following her pulmonary valve replacement.  Her pulmonary artery pressures reflect her constrictive pericarditis.

## 2019-09-16 ENCOUNTER — TELEPHONE (OUTPATIENT)
Dept: PEDIATRIC CARDIOLOGY | Facility: CLINIC | Age: 34
End: 2019-09-16

## 2019-09-16 NOTE — TELEPHONE ENCOUNTER
----- Message from Sneha No sent at 9/16/2019  3:09 PM CDT -----  Contact: Sean-- [phone]  Type:  Patient Returning Call    Who Called: Sean    Who Left Message for Patient: Meagan     Does the patient know what this is regarding?: yes    Would the patient rather a call back or a response via [facility]? Call    Best Call Back Number: [phone]    Additional Information:  Sean called to return nurse's call. She is requesting a call back.

## 2019-09-16 NOTE — TELEPHONE ENCOUNTER
Spoke to pt regarding scheduling PPVR- pt requests December 3rd d/t personal schedule. Pre-procedural instructions given and pt stated understanding. Dr. Carpenter updated at this time.

## 2019-09-16 NOTE — TELEPHONE ENCOUNTER
----- Message from Lula Goncalves RN sent at 9/10/2019  3:34 PM CDT -----      ----- Message -----  From: Isaac Hensley Jr., MD  Sent: 9/10/2019   3:24 PM  To: Lula Goncalves RN, #    OK to scheduled for cath with Kelley PPVI  Steven    ----- Message -----  From: Chantel Saleh MD  Sent: 9/9/2019  11:55 AM  To: Isaac Hensley Jr., MD, Lula Goncalves RN    Hi Steven, Hi Lula Baez would like to schedule a percutaneous pulmonary valve implantation.    Thanks.    Chantel

## 2019-09-16 NOTE — TELEPHONE ENCOUNTER
Left detailed VM for pt regarding scheduling potential PPVR. Instructed for patient to call our office to schedule at her convenience. Dr. Carpenter updated at this time.

## 2019-09-30 RX ORDER — FUROSEMIDE 40 MG/1
TABLET ORAL
Qty: 30 TABLET | Refills: 0 | Status: SHIPPED | OUTPATIENT
Start: 2019-09-30 | End: 2020-01-06

## 2019-10-03 ENCOUNTER — OFFICE VISIT (OUTPATIENT)
Dept: CARDIOLOGY | Facility: CLINIC | Age: 34
End: 2019-10-03
Payer: COMMERCIAL

## 2019-10-03 ENCOUNTER — CLINICAL SUPPORT (OUTPATIENT)
Dept: CARDIOLOGY | Facility: CLINIC | Age: 34
End: 2019-10-03
Payer: COMMERCIAL

## 2019-10-03 VITALS
HEIGHT: 69 IN | HEART RATE: 91 BPM | WEIGHT: 248.56 LBS | DIASTOLIC BLOOD PRESSURE: 71 MMHG | OXYGEN SATURATION: 98 % | BODY MASS INDEX: 36.81 KG/M2 | SYSTOLIC BLOOD PRESSURE: 111 MMHG

## 2019-10-03 DIAGNOSIS — Z95.810 AICD (AUTOMATIC CARDIOVERTER/DEFIBRILLATOR) PRESENT: ICD-10-CM

## 2019-10-03 DIAGNOSIS — Z87.74 TETRALOGY OF FALLOT S/P REPAIR: Primary | ICD-10-CM

## 2019-10-03 DIAGNOSIS — Q21.3 TOF (TETRALOGY OF FALLOT): ICD-10-CM

## 2019-10-03 DIAGNOSIS — Z95.810 PRESENCE OF AUTOMATIC CARDIOVERTER/DEFIBRILLATOR (AICD): ICD-10-CM

## 2019-10-03 PROCEDURE — 93303 ECHO TRANSTHORACIC: CPT | Mod: S$GLB,,, | Performed by: PEDIATRICS

## 2019-10-03 PROCEDURE — 93325 DOPPLER ECHO COLOR FLOW MAPG: CPT | Mod: S$GLB,,, | Performed by: PEDIATRICS

## 2019-10-03 PROCEDURE — 93320 DOPPLER ECHO COMPLETE: CPT | Mod: S$GLB,,, | Performed by: PEDIATRICS

## 2019-10-03 PROCEDURE — 93320 PR DOPPLER ECHO HEART,COMPLETE: ICD-10-PCS | Mod: S$GLB,,, | Performed by: PEDIATRICS

## 2019-10-03 PROCEDURE — 93303 PR ECHO XTHORACIC,CONG A2M,COMPLETE: ICD-10-PCS | Mod: S$GLB,,, | Performed by: PEDIATRICS

## 2019-10-03 PROCEDURE — 93288 PR INTERROG EVAL, IN PERSON,PACEMAKER: ICD-10-PCS | Mod: S$GLB,,, | Performed by: PEDIATRICS

## 2019-10-03 PROCEDURE — 3008F BODY MASS INDEX DOCD: CPT | Mod: CPTII,S$GLB,, | Performed by: PEDIATRICS

## 2019-10-03 PROCEDURE — 99215 PR OFFICE/OUTPT VISIT, EST, LEVL V, 40-54 MIN: ICD-10-PCS | Mod: 25,S$GLB,, | Performed by: PEDIATRICS

## 2019-10-03 PROCEDURE — 93288 INTERROG EVL PM/LDLS PM IP: CPT | Mod: S$GLB,,, | Performed by: PEDIATRICS

## 2019-10-03 PROCEDURE — 99215 OFFICE O/P EST HI 40 MIN: CPT | Mod: 25,S$GLB,, | Performed by: PEDIATRICS

## 2019-10-03 PROCEDURE — 3008F PR BODY MASS INDEX (BMI) DOCUMENTED: ICD-10-PCS | Mod: CPTII,S$GLB,, | Performed by: PEDIATRICS

## 2019-10-03 PROCEDURE — 93325 PR DOPPLER COLOR FLOW VELOCITY MAP: ICD-10-PCS | Mod: S$GLB,,, | Performed by: PEDIATRICS

## 2019-10-03 PROCEDURE — 93000 PR ELECTROCARDIOGRAM, COMPLETE: ICD-10-PCS | Mod: 59,S$GLB,, | Performed by: PEDIATRICS

## 2019-10-03 PROCEDURE — 93000 ELECTROCARDIOGRAM COMPLETE: CPT | Mod: 59,S$GLB,, | Performed by: PEDIATRICS

## 2019-10-03 NOTE — PROGRESS NOTES
Sean Baez was seen in the Adult with Congenital Heart Disease Clinic at [facility] on October 3rd, 2019.  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   She requires a pulmonary valve replacement.  The purpose of this visit is to evaluate her following a decrease in enalapril.  Her complex course is outlined below.       Sean underwent repair of tetralogy of Fallot in early childhood, and then a pulmonary valve replacement later in childhood.  In May 2005, she had a second pulmonary valve replacement with a 29 mm Mosiac porcine valve in the pulmonary position because of pulmonary insufficiency, and placement of an epicardial pacemaker for sinus node dysfunction, and placement of an automatic intracardiac defibrillator with an epicardial patch for ventricular tachycardia.  The AICD generator was changed in 2011.  We followed her for many years in this clinic with a diagnosis of right ventricular dysfunction, a well-functioning porcine pulmonary valve, ventricular tachycardia and sinus node dysfunction, pacemaker and AICD, tricuspid stenosis, mitral insufficiency, mild left ventricular dysfunction and obesity. Her rhythm was well controlled over the last several years, with only one inappropriate shock about 9 years ago requiring a pacemaker adjustment.       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  During that time she had reasonable exercise tolerance, pulmonary  valve function and no arrhythmias.      At a clinic visit in April, 2017, she was feeling well.  Physical exam showed a grade 2 to 3/6 systolic ejection murmur best heard at the left mid to upper sternal border and over the precordium, and a newly heard diastolic rumble at the left lower sternal border. The liver edge was 2 cm below the right costal margin.  Pulses were 2+ in brachial and 1+ in femoral, although it was difficult to feel her pulse is secondary to obesity.  There was no peripheral edema. An EKG showed sinus rhythm at 68 bpm, normal atrial and ventricular forces, and T-wave inversions in lead 1 V5 and V6 suggestive of ischemia (unchanged). An echocardiogram showed an aortic root of 30 mm, the left atrium was dilated at 44 mm, (no old values for comparison), the right ventricle was 34 mm which was slightly increased from 32 mm, and the fractional area change of 30%, showed mildly reduced function which had improved.  The interventricular septum measured 11 on the left ventricular posterior wall 12 mm which are at the upper limits of normal, the left ventricular internal dimension in diastole was 52 and in systole 36 mm giving him measured ejection fraction of 57%.  The ejection fraction was slightly decreased but improved since the previous value.  The pulmonary valve annulus measured 19 mm. There was slight paradoxic motion of the interventricular septum, with otherwise normal-appearing right and left ventricular function. The tricuspid valve domed in diastole.  The pulmonary valve leaflets could be seen and the pulmonary annulus of 19 mm was probably underestimated.  The right atrium was dilated at 61 x 57 mm.  The left pulmonary artery measured 12 mm and the right pulmonary artery could not be seen.  The aortic arch was left-sided and unobstructed.  The gradient across the tricuspid valve was 9 peak and 4 mean mmHg gradient indicating mild stenosis, unchanged.  There was trivial tricuspid  regurgitation with a regurgitant gradient of 24 mmHg suggesting normal right ventricular pressure.  The gradient across the porcine pulmonary valve was 22 peak and 13 mean mmHg showing trivial obstruction, and there was trivial pulmonary insufficiency.  There was mild mitral insufficiency and no residual ventricular septal defect.  An exercise stress test showed karma Harris exercised 7 minutes and 30 seconds of the Sam protocol, and then stopped due to fatigue and leg cramps.  Baseline blood pressure was 108/77 mmHg and then increased to 159/68 mmHg, which may be an underestimate, before returning to baseline.  Baseline heart rate was 68 bpm and increased to 147 bpm before returning to baseline.  There were initially T-wave inversions in lead I V5 and V6, which then reverted to upright during exercise, before becoming negative again in recovery.  There were no other ST segment changes or arrhythmias.      At that visit, I felt  that Steven was stable with her complex congenital heart disease.  Her right and left ventricular function had improved.  Her porcine pulmonary valve was functioning well with only mild stenosis and insufficiency.  She had mild tricuspid stenosis with significant right atrial dilatation, also unchanged.  She had left atrial dilatation which I felt was secondary to her mitral insufficiency and perhaps some diastolic dysfunction, also unchanged.  Per her report, her pacemaker was functioning properly and her rhythm was controlled on her current dose of metoprolol.  I requested records from her electrophysiologist in [place]. No cardiac intervention was recommended  A CBC, CMP, BNP, and lipid profile were drawn which showed a normal CBC, and normal CMP, a BNP of 82 pg/mL, an elevated total cholesterol of 220 mg/dL, a low HDL cholesterol of 36 mg/dL, normal triglycerides, and an elevated LDL cholesterol of 168 mg/dL.  A chest x-ray was obtained at that visit which showed mild cardiomegaly with  normal vascularity, epicardial pacing leads on the right atrium and right ventricle and the AICD patch at the apex.  Sean reported that she had become engaged and desired pregnancy in the future. At that visit I felt she would be able to tolerate a pregnancy.       At a clinic in November, 2017, Sean was not feeling well.  Approximately 3 months prior, while walking on the beach, she had the sudden onset of fatigue, shortness of breath, palpitations and sweating, but no chest pain.  Although she sat down and went into air-conditioning, the fatigue, shortness of breath and sweating persisted for another 30 minutes. Following that, she has noticed an increase in fatigue and exercise intolerance.  She has not participated in any exercise for that reason.  She reported these were the same symptoms as before her last valve replacement. Sean also had a very busy life and lots of stresses.  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  She reported increased fatigue with these activities and decreased energy. She had not had a pacemaker check in 9 months.  She was recently started on atorvastatin for elevated cholesterol, which was her only medication change.  She was taking metoprolol succinate 50 mg by mouth daily, enalapril 5 mg in the morning and 2.5 mg at night, aspirin 81 mg by mouth daily, atorvastatin 20 mg by mouth daily, Zoloft 100 mg by mouth daily, and venlafaxine 150 mg by mouth daily.  Her exam showed she had gained weight to 117 kg, a 2 kg weight increase. Her blood pressure in the right arm was 119/76 mmHg.  Pulse was 91 bpm and pulsed oximetry in room air was 98%.  First heart sound was normal and second heart sound was widely split. There was a grade 2/6 systolic ejection murmur best heard at  the left upper sternal border and down the left sternal border.  A diastolic rumble was heard.  The liver was 2 cm below the right costal margin and unchanged.  The pacemaker was palpated in the upper mid abdomen.  Pulses were 2+ bilaterally except for 1+ in the right femoral.  There was no peripheral edema.     EKG showed sinus rhythm at a rate of 73 bpm with first-degree block (386 ms).  T-wave inversions in leads II and aVF, normal ventricular forces, and T wave inversions in leads V1 and V5, with T-wave flattening in V6.  Compared to the previous EKG, it was no change.     Echocardiogram showed the anatomy for tetralogy of Fallot status post complete repair and status post a pulmonary valve replacement.  It should be mentioned that the heart was difficult to visualize and images were of poor quality and attributed to obesity.  The aortic root measured 30 mm, unchanged, left atrium 35 mm, improved, and right ventricle 34 mm, unchanged.  The interventricular septum measured 11 and the left ventricular posterior wall 11 mm both of which were at the upper limits of normal.  The left ventricular internal dimension in diastole was 56 and in systole 43 mm giving a calculated ejection fraction of 46% which has decreased since the previous study.  There was flattening of septal wall motion.  The left ventricular posterior wall bowed posteriorly.  The pulmonary valve leaflets were difficult to see.  The tricuspid valve leaflets were difficult to see, but the tricuspid valve annulus was subjectively smaller than the mitral valve annulus.   Right ventricular function was judged subjectively to be mildly depressed, but the fractional area change was measured at 30%, which is within normal limits.   The branch pulmonary arteries could not be imaged. The aortic arch was left-sided and unobstructed.  The gradient across the tricuspid valve was 10 peak and 5 mean mmHg indicating mild tricuspid stenosis, unchanged.  The gradient  across the bioprosthetic pulmonary valve was 23 peak mmHg, unchanged.  Trivial pulmonary insufficiency.  No tricuspid insufficiency.  No aortic stenosis or aortic insufficiency.  Trivial mitral insufficiency, improved.  No aortic arch obstruction.       The pacemaker was then downloaded by the [employer] representative, and it showed that she is atrially paced 6% of the time with no ventricular pacing.  She had a prolonged AV interval of 370 ms. Her underlying rhythm was sinus bradycardia with a ventricular rate in the 40s.  She had no abnormal tachycardias greater than 150 bpm.  Her atrial and ventricular thresholds were good.  She had less than one year life on her battery.  We adjusted her pacemaker settings to have rates detected (monitor zone) greater than 130 bpm for 32 beats in duration.     At that visit,  my impression was Sean had a decrease in exercise tolerance and an increase in fatigue which was cardiac related.  She was difficult to evaluate by transthoracic echocardiography, probably secondary to obesity. I felt  her episode was secondary to an increase in tricuspid stenosis or bioprosthetic pulmonary valve dysfunction, and a decrease in left ventricular function.  Her symptoms may have been exacerbated by the stresses of finishing her residency, moving across the country, starting a new job, and planning a wedding.       Therefore, I began Sean on coenzyme Q 10 100 mg by mouth twice a day, for her left ventricular dysfunction, right ventricular dysfunction, and because she was started on a statin drug.   I presented her in cath/surgery conference for cardiac catheterization, where her tricuspid valve, pulmonary valve and coronary arteries could be evaluated, and possibly a Serina valve could be implanted and or a tricuspid valve balloon.  At the same time, I wanted her to have a transesophageal echocardiogram since her transthoracic imaging was poor.   I kept her on the same doses of her other  medications, enalapril, metoprolol, atorvastatin, and aspirin.    Laboratory work which was obtained at that visit which subsequently showed a normal CBC with the exception of a mildly elevated platelet count of 352,000, and a normal CMP.  Of note, her BNP was mildly elevated to  162 pg/mL, compared to the previous value.  A free T4 was normal at 0.9 ng/dL, but her free T3 was decreased at 1.8 pg/mL, although her TSH was normal at 1.4 to micro-units per milliliter.  A cholesterol profile was repeated now that she was on atorvastatin, and that showed the cholesterol had decreased to 179 mg/dL, and the LDL cholesterol had decreased to 123.4 mg/dL, which were now normal, although her HDL remained decreased at 37 mg/dL.  Sean then saw her internal medicine doctor who began her on thyroid replacement hormone.        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  A catheterization and possible Serina valve implantation has been scheduled for February 6, 2018.  Her wedding was March 10, 2018.  Her work schedule was rather demanding, with 7-9 days in-house as a hospitalist, followed by 7 days off.   Sean was feeling better than she was at her last clinic visit, and in particular had less fatigue.  She was still short of breath with climbing stairs, but has not had any further episodes of sudden onset of shortness of breath, weakness, sweating, palpitations or chest pain.  She had started a diet and light exercise program.     She was taking metoprolol XL 50 mg by mouth daily, enalapril 5 mg in the morning and 2.5 mg at night, aspirin 81 mg by mouth daily, coenzyme Q10 200 mg by mouth twice a day, atorvastatin 20 mg by mouth daily, Synthroid 150 µg by mouth daily, Zoloft 100 mg by mouth daily, and had weaned her Effexor down to 75 mg by mouth  daily.     Exam revealed an obese but very pleasant and otherwise healthy appearing young woman in no acute distress.  Height was 175.3 cm and weight was 116.8 kg, a 1 kg weight decrease since her last clinic visit. First heart sound was normal and second heart sound was widely split.  The was a grade 2/6 systolic ejection murmur heard best at the left upper sternal border, and faintly over the precordium.  Diastole was clear (the previous diastolic rumble had resolved).  The liver edge was 2 cm below the right costal margin and unchanged.  Pulses were 2+ bilaterally.  There was no peripheral edema.     EKG showed an atrially paced rhythm at a rate of 68 bpm with a prolonged WI interval of 112 ms, with right ventricular conduction delay and negative T waves in the left precordial leads, unchanged.     Echocardiogram showed the anatomy for status post repair of tetralogy of Fallot and placement of a bioprosthetic valve in the pulmonary position.  The aortic root was mildly dilated at 31 mm, unchanged, the left atrium had increased to 45 mm, and the right ventricle measured 29 mm, unchanged.  The interventricular septum measured 12 and the left ventricular posterior wall 12 mm which were at the upper limits of normal.  The left ventricular internal dimension in diastole was 53 and in systole 37 mm giving a calculated ejection fraction of 58%, which had improved significantly from her previous ejection fraction of 46% 2 months ago.  The right ventricle was not seen well enough quantitate function, but subjectively appeared improved since the previous visit.  They bioprosthetic valve was poorly seen in the pulmonary position, and the leaflets were thick and moving poorly.  The right atrium was significantly dilated at 54 x 53 mm.  The tricuspid valve was not seen well enough to measure the annulus.  pulmonary arteries could not be imaged.  The aortic arch was left-sided  The gradient across the tricuspid valve was 11  peak and 5 mean millimeters of mercury indicating mild stenosis which was unchanged.  The gradient across the right ventricular outflow tract was 27 mmHg peak.  There was mild pulmonary insufficiency. There was trivial tricuspid insufficiency which was inadequate to estimate right ventricular pressures.       Sean was evaluated by Dr. Chintan Cabrera, who felt that Sean did not have any abnormal tachycardia arrhythmias.  Her defibrillator, was approaching end-of-life.  Please see Dr. Cabrera's evaluation for more details.  He recommended that Sean receive monthly pacemaker/AICD evaluations to determine the best time for replacement.     My impression at that visit was Sean's left ventricular function had improved significantly since being placed on coenzyme every 10 and thyroid replacement hormone.  I believed this had led to a decrease in symptoms and an improvement in energy level.  I continued to feel, however, that Sean still required a transesophageal echocardiogram and a cardiac catheterization and possible Serina valve replacement.  I did feel, however, that this could be delayed until after her wedding, to avoid the unlikely chance of a complication that might impact her wedding.   My opinion was that waiting an additional month to perform the catheterization would not adversely affect her health.  I discussed this with Sean and her fiancé, and they were in agreement.     Sean expressed the desire to undergo pregnancy at some time in the future.  I recommended she have her cardiac catheterization and possible Serina valve implantation, and also her AICD generator change, before becoming pregnant.  I continued the same doses of her medications.   SBE prophylaxis was recommended for dental and surgical procedures.       Sean was seen again in clinic in February, 2018.  She was generally feeling well except for one episode of tachycardia, shortness of breath and nausea that occurred out  while working out with her  in the morning, and she had not yet eaten breakfast.  She had no more recurrences of these symptoms even though she has had several training sessions since then.  She otherwise felt well, and participated in all activities including exercising and working as a hospitalist, and denied chest pain, palpitations and shortness of breath and swelling.  She had postponed her cardiac catheterization and possible Serina valve placement until after the wedding.  She continued taking metoprolol 50 mg by mouth daily, enalapril 5 mg in the morning and 2.5 mg in the evening, aspirin 81 mg by mouth daily, coenzyme Q 10 100 mg by mouth twice a day, Synthroid 150 µg by mouth daily, and Zoloft 100 mg by mouth daily.  She has been weaned off her Effexor.     Physical exam revealed an obese, pleasant and healthy-appearing woman in no acute distress.  Vital signs showed a height of 175.3 cm or 5 feet 9 inches and a weight of 119 kg or 262 lbs. 7 oz., which is a weight increase of 2.2 kg since her last clinic visit.  Blood pressure in the right arm was 107/73 mmHg, pulse 87 bpm, and pulsed oximetry in room air 98%. Examination of the skin showed it to be pink and well perfused.  The lungs were clear.  Palpation of the precordium showed it to be normal with a well-healed midline scar, and a pacemaker palpated in the abdomen.  The liver edge was 2 cm below the right costal margin, unchanged.  Pulses were 2+ bilaterally.  There was no peripheral edema.     EKG was obtained which showed a paced atrial rhythm at 78 bpm, with right ventricular conduction delay, and T-wave inversions in the limband precordial leads suggestive of ischemia, unchanged.     Echocardiogram showed evidence for repair of tetralogy of Fallot status post pulmonary valve replacement.  Two-dimensional imaging was poor.  M-mode parameters were as follows: The aortic root measured 30 mm, left atrium 43 mm, right ventricle 26 mm,  interventricular septum 12 and left ventricular posterior wall 12 mm, the left ventricular internal dimension in diastole was 56 and in systole 36 mm giving a calculated ejection fraction of 55% and these numbers are normal.  The right ventricle function was judged subjectively to be reduced but unable to be quantitated The bioprosthetic valve in the pulmonary position was difficult to see, however thickened leaflets with poor movement were observed.  The aortic arch was left-sided and unobstructed.  The superior vena cava drains normally to the right atrium.  Doppler analysis using pulsed continuous-wave and color-flow:  The there was turbulence across the tricuspid valve with a gradient of 14 peak and 10 mean millimeters of mercury indicating mild tricuspid valve stenosis which was unchanged.  Mitral insufficiency was present which was mild.  The gradient across the bioprosthetic pulmonary valve was 32 mmHg peak in the setting of reduced right ventricular function.  No residual ventriculoseptal defect.  No aortic stenosis or aortic insufficiency. Impression: Status post repair of tetralogy of Fallot. Poor two-dimensional imaging.  Decreased right ventricular function.  Bioprosthetic pulmonary valve with thickened and poorly moving leaflets, and a 32 mmHg peak gradient across it in the setting of reduced right ventricular function.  Mild tricuspid valve stenosis with a 14 peak and 10 mean millimeter gradient across it, unchanged.     Sean was then evaluated by electrophysiologist Dr. Chintan Cabrera, performed a download of her pacemaker, and that showed that there was a 6 beat run of ventricular tachycardia which resolved spontaneously, and that the pacemaker generator close to end-of-life.     My impression from that visit was that Sean had right ventricular dysfunction and pulmonary valve dysfunction, in particular pulmonary valve stenosis.   I felt that she required a pulmonary valve replacement which  hopefully could be performed with a Serina valve.  I believed her right ventricular function would improve after that.  She also has ventricular tachycardia, but it was self-limited and she was protected by both her metoprolol and her AICD.  The generator also required replacement.  Dr. Cabrera has recommended the metoprolol be increased to 50 mg by mouth twice a day. Following this clinic visit, Sean called with some leg swelling, and was placed on Lasix 20 mg by mouth daily, in addition to her other medications.     Sean had an uneventful wedding and honeymoon.  She underwent replacement of her ICD generator by Dr. Chintan Cabrera on April 4, 2018.  An ICD PAWAN NAVA S  [id number]: Serial No. [id number] was placed in the abdominal pocket and the previous generator removed.  She tolerated the procedure well.  Lead testing revealed:      RV Lead:       Threshold: 1.3 V / 0.4 ms       Impedance: 646 ohms       R wave: 5.9 mV  RA Lead:       Threshold: 1.3 V / 0.4 ms       Impedance: 589 ohms       P wave: .5 mV     Sean underwent transesophageal echocardiogram and cardiac catheterization under general anesthesia on April 26, 2018.  Transesophageal echocardiogram showed:     Mild right ventricular dilatation.  Paradoxic motion of the interventricular septum.    Normal left ventricle structure and size, with normal left ventricular posterior wall motion.  Normal to mildly decreased right ventricular free wall motion.  No pericardial effusion with normal-appearing pericardium.  No atrial or ventricular shunts.  Tethering of the tricuspid valve septal leaflet, with mild doming mild tricuspid valve prolapse.  Normal tricuspid valve velocity, and mild tricuspid valve insufficiency, with a 29 mmHg gradient.    Immobile and echodense posterior leaflet of the prosthetic pulmonary valve, with a peak gradient of 17 mmHg and mild pulmonary insufficiency.     Catheterization showed:     Saturations: SVC 69%, RA 72%, right  "pulmonary artery 69%, left pulmonary artery 69%, aortic 99%.    Pressures in mmHg:  RA mean 19, RV 50/19, RPA  40/14 mean  25, LPA 43/16 mean 26, RPCWP mean 20, LPCWP mean 20, LV 101/18, ascending aorta 101/59  mean  77, and descending aorta 99/58 mean 75.    Calculations:  Using a hemoglobin of 11.3,  the Qp equaled to Qs at 2.55 L/min/m2, which gave a pulmonary vascular resistance of 2.36 Wood units/m2.  The raw cardiac output was 5.73 liters per minute.     Coronary angiography was then performed which showed normal left and right coronary arteries.  No other angiography was performed.  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     Sena's postprocedure follow-up clinic visit was on May 1, 2018.  Since her discharge from the hospital several days prior, she had been feeling generally well.  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  She had in general noticed increasing shortness of breath with walking compared to 6 months ago. 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  Her swelling had improved after starting the Lasix, she only noticed ankle swelling when on her feet for several hours.  She also noticed an improvement in palpitations after increasing her metoprolol to 50 mg by mouth twice a day, however she continued to experience palpitations every night when lying down, which lasted for a few seconds.  She had occasional "tingling" in her left upper chest which lasted for several seconds and occurred once a week, but no chest pain.  She had not exercised in over one month.  She has continued to gain weight.     Sean was taking Lasix 20 mg by mouth daily added about 2 months prior, coenzyme every 10 100 mg by mouth twice a day, enalapril 5 mg in the morning and 2.5 mg at night, metoprolol tartrate 50 mg by " mouth twice a day, Synthroid, and atorvastatin.  She was no longer taking aspirin or Zoloft.     Physical exam revealed a pleasant, obese and healthy-appearing young woman in no acute distress.  Vital signs showed a height of 175.3 cm or 5 feet 9 inches, and a weight of 121.3 kg or 267 lbs. 6 oz., which is an increase of 2 kg from her last clinic visit.  Blood pressure was 105/64 mmHg and heart rate 65 bpm.  Pulsed oximetry was not obtained     Examination of the skin showed it to be pink and well perfused area the lungs were clear.  Palpation of the precordium showed it to be normal with a well-healed midline scar.  First heart sound was normal and second heart sound was widely split.  There was a grade 2/6 systolic ejection murmur fairly well localized to the left upper sternal border.  I no longer head the diastolic rumble.  The liver edge was 2 cm below the right costal margin.  Pulses were 2+ bilaterally.  The wounds were healed.  There was no peripheral edema.     EKG showed sinus rhythm at 64 bpm, with first-degree block and a SC interval of 266 ms, right ventricular hypertrophy, and T-wave inversions in the inferior and precordial leads. This EKG had not changed since the previous one, however the T-wave in lead V6 has become inverted in the last year.     I reviewed the cardiac catheterization, pressure tracings and transesophageal echocardiogram with Sean and her .  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  I felt that this was the primary cause of her symptoms of exercise intolerance, fatigue and peripheral edema.  The cause of this was perplexing. It was possible that her AICD patch was contributing to diastolic dysfunction, and I felt she should also be worked up for pericardial and myocardial infiltrative diseases.  I continued to feel, however, that she would still benefit from a new  pulmonary valve, since one of the valve leaflets was frozen, and the valve had been in for 13 years.  I believed that the pulmonary valve stenosis, insufficiency and tricuspid valve stenosis was a minor contributor to her right-sided dysfunction and symptoms.  For now, I felt she should be medically managed.  Sean actually looked quite well at that visit, and therefore was continued on her current medical management.  I also felt that her persistent weight gain and obesity were contributing to exercise intolerance. Sean was also complaining of palpitations which needed to be characterized.     Therefore, Sean was kept on the same doses of her Lasix, coenzyme Q 10, enalapril, Synthroid, and atorvastatin.  We placed a 48 hour Holter monitor as surveillance for arrhythmias.  She was advised to slowly begin an exercise program and continue to lose weight with diet.  Laboratory work including an GWEN, rheumatoid factor, complement, sedimentation rate, high specificity CRP, CBC, reticulocyte count, BNP, and CMP.  These results subsequently showed: CBC was within normal limits; the CMP was normal with the exception of a bilirubin of 2.0 mg/dL and a bicarbonate of 22 mmol per liter; the BNP was elevated at 209 pg/mL; the high sensitivity CRP was elevated at 4.72 mg/L; the TSH was 0.037 micro international units per milliliter; the sedimentation rate was elevated at 46 mm/h; the C3 complement was elevated at 185 mg/dL.  The free T4, C4 complement, rheumatoid factor, GWEN and reticulocytes were all normal.  These results indicated an inflammatory process.  Sean was then referred to a rheumatologist to see if an inflammatory disease was contributing to her constrictive pericarditis.  She did not obtain that consultation. Sean's  Holter monitor subsequently showed sinus rhythm at rates 59 - 210 bpm with a heart average heart rate of 64 bpm, frequent premature atrial beats, atrial bigeminy and occasional atrial  couplets, with a total of 1766 premature atrial beats in 24 hours, and rare PVCs.  The increase in atrial ectopy was new.     Sean also was evaluated by Dr. Thad Ny at [facility] adult congenital heart disease program in [place].  Dr. Ny agreed that Sean had developed a constrictive pericarditis, and he felt it might be secondary to the AICD patch over the cardiac apex, and possibly intrinsic restrictive physiology from her tetralogy of Fallot.  He did not think a Serina valve placement was indicated at that time.  He felt that her tricuspid stenosis may have been secondary to her initial surgical repair.  He felt that her obesity was contributing to her dyspnea, and suggested a possible sleep study.  He felt that she probably would be able to tolerate a pregnancy in the future, but would be in a high risk category.  He recommended that her diuresis be increased and that her Lasix be increased to 40 mg by mouth daily.     Sean was then seen in May, 2018.  Since her last clinic visit 6 weeks prior, Sean was feeling better.  She had begun an exercise program with a , did primarily weight lifting but also walked on a treadmill for about 5 minutes.  She reported less shortness of breath with exercise.  Her energy level had improved since her last clinic visit.  Her previous complaints of palpitations had resolved. She was now active working 7 days at a time.  She was dieting and has lost about 12 pounds.     Sean was currently taking Lasix which had been increased to 40 mg by mouth daily and to which she has a good diuretic response, coenzyme every 10 100 mg by mouth twice a day, metoprolol tartrate 50 mg by mouth twice a day, enalapril 5 mg in the morning and 2.5 mg at night, Synthroid which had been decreased to 137 µg daily, Singulair 10 mg by mouth daily, levo cetirizine 5 mg by mouth daily, fish oil and multivitamins.     Physical exam revealed a pleasant, healthy-appearing  obese young woman in no acute distress.  Vital signs showed a height of 175.3 cm or 5 feet 9 inches, and a weight of 116.1 kg or 255 lbs. 15 oz., which was a 5 kg weight decrease since her last clinic visit.  Blood pressure in the right arm was 107/71 mmHg, pulse 84 bpm and pulsed oximetry in room air 95%.     Examination of the skin showed it to be pink and well perfused.  The lungs are clear.  Palpation of the precordium showed it to be normal with a well-healed midline scar.  First heart sound was normal and second heart sound widely split.  The was a grade 2/6 systolic ejection murmur best heard at the left upper sternal border but also over the precordium. Diastole was clear.  The liver edge was 2 cm below the right costal margin.  Pulses were 2+ bilaterally.  There was no peripheral edema.     EKG was obtained which showed an atrially paced rhythm at 70 bpm with an AV conduction time of 360 ms, right ventricular hypertrophy, and T-wave inversions in leads I, II, V5 and V6 and T-wave flattening in leads aVF indicating ischemia, with a QTc interval of 441.  Compared to the previous study there was a negative T-wave in lead I.     An echocardiogram was obtained which showed status post repair of tetralogy of Fallot.  Two-dimensional imaging was poor.  M-mode parameters were as follows: The aortic root measured 30 mm unchanged, left atrium was significantly dilated at 46 mm increased, right ventricle 20 mm by M-mode and 35 mm in the long axis view, the interventricular septum measured 10 and the left ventricular posterior wall 10 mm which were both normal.  The left ventricular internal dimension in diastole was 54 and in systole 41 mm giving a calculated ejection fraction of 47%.  The left ventricular ejection fraction has decreased since the previous study (56%).  There was no pericardial effusion.  There was decreased excursion of the septal leaflet of the tricuspid valve leading to inadequate tricuspid valve  opening in diastole.  The pulmonary valve was echo dense and the posterior leaflet had decreased motion although the valve was poorly seen in general.  The ventriculoseptal defect patch was in proper position.  The right atrium was dilated and measured 47 x 46 mm. There was subjectively normal right ventricular function with a fractional area change of 47%.  There was poor subcostal imaging.  The aortic arch was left-sided and unobstructed. The right superior vena cava drained normally to the right atrium.  Doppler analysis using pulsed, continuous wave and color-flow: Tricuspid stenosis was present with a 12 peak and 5 mean millimeter gradient across it indicating mild + tricuspid stenosis which was unchanged.  The gradient across the pulmonary valve was 25 mmHg peak indicating mild obstruction and there was mild to moderate pulmonary insufficiency.  No tricuspid insufficiency.  Trivial mitral insufficiency.  No aortic stenosis, aortic insufficiency, or residual ventriculoseptal defect.  Impression: Status post repair of tetralogy of Fallot with bioprosthetic valve in the pulmonary position.  Echodense pulmonary valve with decreased motion of the posterior leaflet, but only a 25 mmHg peak gradient across it indicating mild obstruction in the presence of normal right ventricular function, with mild to moderate pulmonary insufficiency.  Moderate left ventricular dysfunction which has increased.  Normal right ventricular function.  Dilated left atrium which has increased.  Dilated right atrium.  Tricuspid stenosis with decreased excursion of the septal leaflet.     An exercise stress test was performed which showed that [place] exercise 6 minutes and 49 seconds of the Sam protocol, indicating poor exercise tolerance for age.  Baseline heart rate was 63 bpm, increased to 137 bpm at peak exercise before decreasing back to 71 bpm 9 minutes into recovery.  This was a suboptimal peak heart rate which was probably  secondary to beta-blockade.  Baseline blood pressure was 98/63 mmHg in the right arm, increased to 117/65 mmHg 1 minute into recovery, before decreasing back to 103/51 mmHg 7 minutes into recovery.  This was a blunted pressure response to exercise and may have been due to beta-blockade and ACE inhibition.  Baseline saturation was 97% and decreased to 87% at peak exercise, before increasing back to 98% 1 minute into recovery.  There were desaturations at peak exercise that probably represented intrapulmonary shunting.  Sinus rhythm with frequent PACs and occasional atrial couplets were present starting in stage II, which then resolved at peak exercise.  Occasional PACs were then seen again in recovery.   There were no ST segment depressions throughout exercise, in fact, the negative T-wave in leads 2 and V5 improved during exercise.  It should be noted that the patient was examined after exercise and the lungs were clear.  Impression: Suboptimal level of exercise for age, with blunted heart rate response and blood pressure response.  Moderate desaturations with exercise to 87% which then resolved.  Premature atrial complexes with atrial couplets increase in stage II and then are suppressed. Improvement in T-wave inversions throughout exercise.     Sean was also evaluated by Dr. Chintan Cabrera who did not detect any arrhythmias on her newly placed generator download, and did not make any recommendations in pacemaker or rhythm management.  He asked to see her again in 6 months.     My impression from that visit was Sean had improved since her last clinic visit.  She had improved symptoms of exercise intolerance, improved energy level, improved activity, and her palpitations had resolved. She was now exercising regularly and losing weight.  She still had poor exercise tolerance, however, and desaturated with exercise, which I suspected was intrapulmonary shunting.  I did not detect pulmonary edema on exam during  exercise.  She had a progression in left ventricular dysfunction for which I found no etiology.  She continued to have mild pulmonary valve stenosis with mild to moderate pulmonary insufficiency of her bioprosthetic valve.  Her right ventricular function had improved. She also had mild tricuspid valve stenosis, and moderate right atrial and left atrial dilatation which had increased.  Her recent Holter monitor has showed no ventricular ectopy, rather PACs, atrial couplets and triplets.  I felt that Sean's cardiac condition had improved with increasing diuresis and increasing exercise capacity and also weight loss.  I continued to be concerned by her diagnosis of constrictive pericarditis, her left ventricular dysfunction, and her intrapulmonary shunting.  I also felt that weight lifting was not the best exercise for her, and asked her to switch more to cardio training and less weight lifting.     Sean was instructed to increase her coenzyme Q10 to 200 mg by mouth twice a day, and stay on the same doses of her other medications.  Laboratory studies were ordered including a CBC, CMP, BNP, complement C3, CRP and a sedimentation rate, but were not obtained.   She was given a return to this clinic in 3 months time with an EKG and an echocardiogram.  SBE prophylaxis continued to be in order for all dental and surgical procedures.     Sean  was seen on October 4, 2018.  Since her previous clinic visit 4 months prior, she continued to improve and felt well.  She has continue to diet and lost another 14 lb in 4 months, in addition to the 12 lb she had lost at her last clinic visit.  She is exercising regularly which includes Pilates, ballet bar, light weight training, and cardio roping machine, in addition to once a week with a .  She denied shortness of breath, chest pain, palpitations or swelling.  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  She was taking her medicine  regularly.  She continues to have a waqar IUD, which she was planning on removing in March and then attempting pregnancy.  Her previous pacemaker download was October 3, 2018, the results of which were not yet available.     Sean was taking enalapril 5 mg in the morning and 2.5 mg in the evening, coenzyme Q10 200 mg p.o. b.i.d., metoprolol tartrate 50 mg p.o. b.i.d., Lipitor 20 mg p.o. q.day, Lasix 40 mg p.o. q.day to which she has a good diuretic response, levo cetirizine 5 mg p.o. q.day, Synthroid 137 mcg p.o. q.day, and Singulair 10 mg p.o. q.day.     Physical exam revealed a healthy-appearing and pleasant young woman in no acute distress.  Vital signs showed a height 175.3 cm or 5 ft 9 in, and weight of 109.6 kg or 241 lb 12 oz, which is a 14 lb weight decrease since her previous clinic visit in May, 2018.  Pulse was 86 beats per minute, pulse oximetry in room air 97%, and blood pressure in the right arm 107/68 mm of mercury.     Examination of the skin showed it to be pink and well perfused.  The lungs were clear.  Palpation of the precordium showed to be normal with a well-healed midline scar.  First heart sound was normal and 2nd heart sound was widely split.  There was a grade 2/6 systolic ejection murmur best heard at the left upper sternal border, but well over the precordium.  Diastole was clear.  The liver edge was at the right costal margin and improved.  Pulses were 2+ bilaterally. There was no peripheral edema.  The pacemaker was palpated in the abdomen.     EKG was obtained which showed a paced atrial rhythm at 68 beats per minute, right ventricular conduction delay, and T-wave inversions in leads I,II, AVF, V1 - V6, unchanged.     An echocardiogram was obtained which showed evidence for repair of tetralogy of Fallot, and a bioprosthetic valve in the pulmonary position.  M-mode parameters were as follows:  The aortic root measured 30 mm unchanged, left atrium 44 mm decreased slightly (46  mm), right ventricle 28 mm by m mode and 35 mm by  2D in long axis, unchanged.  The interventricular septum measured 10 and left ventricular posterior wall 11 mm, both normal.  The left ventricular internal dimension in diastole was 52 and systole 36 mm giving a calculated ejection fraction of 59%, he which has improved and is now normal (previously 47%).  The pulmonary valve leaflets were thickened with decreased mobility, with the posterior leaflet being frozen, as noted previously.  The pulmonary valve annulus was small at 16 mm.  The right atrium was dilated measuring 56 x 47 mm which had increased.  There was decreased opening of the tricuspid valve with tethering of the septal leaflet.  Both right and left ventricular function were judged subjectively to be within normal limits.  The ventricular septal defect patch was in proper position.  Doppler analysis using pulsed, continuous and color-flow:  No tricuspid insufficiency.  Peak gradient across the tricuspid valve was 11 peak and 6 mean mm of mercury, unchanged.  Peak gradient across the bioprosthetic pulmonary valve was 30 peak and 18 mean mm of mercury, slightly increased parentheses 25 mm of mercury peak).  Pulmonary insufficiency was present which was mild and improved.  No mitral insufficiency.  No aortic stenosis or aortic insufficiency.  The gradient across the mitral valve was 2 peak and 1 mean mmHg with E wave dominance suggestive of constrictive pericarditis.  Impression:  Tetralogy of Fallot status post placement of a bioprosthetic valve in the pulmonary position.  Normal right ventricular size and function. Normal left ventricular size and function which has returned to normal. Hypoplastic pulmonary annulus measuring 16 mm, with thickened leaflets and frozen posterior leaflet, with a 30 mm Hg peak gradient suggesting mild obstruction which has increased slightly, and mild pulmonary insufficiency which has improved.  Dilated right atrium which has  increased to 56 x 47 mm, and dilated left atrium which is stable to slightly improved at 44 mm.  Decreased excursion of the septal leaflet of the tricuspid valve leading to reduced flow and an 11 peak and 6 mean mmHg gradient, unchanged.  No gradient across mitral valve but E wave dominance, consistent with constrictive pericarditis.       An exercise stress test was obtained which showed that Sean exercised 7 min and 18 sec of the Sam protocol, 1 min and 18 sec into stage 3, which was below predicted for an adult, but improved from the previous study by about 30 sec.  Baseline heart rate was 63 beats per minute, increased to 159 beats per minute at peak exercise, before decreasing back to 75 beats per minute at the end of 7 min of recovery. Baseline blood pressure was 115/64 mm of mercury, increased to 140/77 mm of mercury 1 min after peak exercise, before decreasing back to 108/65 mm of mercury 7 min into recovery.  Saturation was 98% at baseline, and then decreased to 94% at peak exercise, before returning to 99% 1 min into recovery.  Patient remained fully saturated throughout the remainder of the study.  Baseline rhythm was sinus at 63 beats per minute, and patient continued in sinus rhythm throughout exercise and recovery.  Occasional unifocal PVCs were seen starting in stage II and III, which resolved in recovery.  There were no PACs.  Baseline EKG had negative T-waves in leads 1, 2, AVF, and V1 through V6.  During exercise, T-waves became flattened in leads 1 and 2 and less negative in leads AVF, V1 through V6.  At peak exercise, T-waves were upright in leads 1, and 2, flattened in AVF, and upright in V1 through V6.  During recovery, T-waves again became negative in leads 1, 2, and AVF, V1 through V6.  Impression:  Low level of exercise predicted for age, however an improvement from the previous study of 30 sec.  Normal blood pressure and her heart rate response to exercise.  Mild desaturations to 94%  at peak exercise, which recovers within 1 min to 99%.  Occasional unifocal PVCs during exercise which resolved in recovery, and no PACs, which is an improvement from the previous study.  Inverted T-waves in inferior and precordial leads which convert to positive during exercise, and then revert back to negative during recovery.     My impression from this visit was that Sean had improvement in cardiac function, rhythm, energy, and exercise tolerance.  I attributed this to an improvement in left ventricular function, improvement in right ventricular function, a decrease in weight, an increase in exercise training, and an improvement in arrhythmias.  She still has evidence for a thickened and poorly functioning bioprosthetic valve, however the gradient across it was only 30 mm of mercury peak, with mild pulmonary insufficiency, and her right ventricular function had improved to normal. I still felt that she would need a Serina valve placement in the near future.  She still had evidence for constrictive pericarditis as evidence by her dilated right and left atria, and Doppler flow pattern in the left ventricle, however, I saw an overall improvement in cardiac function leading to decreased symptoms.     Sean's reasons for improvement were probably multifactorial, and I felt no reason to change any of her management.  Therefore, Sean  was instructed to continue on the same doses of her medications.  She was to continue her successful diet and exercise program.  I contacted Dr. Cabrera to ask for the results of her recent pacemaker download.  I suspected that she and her fetus would tolerate a pregnancy in the future.  Sean was instructed to return to this clinic in about 3 months time, at which point she would have a dual appointment with electrophysiologist Dr. Cabrera and myself, with an EKG and an echocardiogram.  Laboratory work was ordered including CBC, CMP, BNP, thyroid function studies and lipid  profile.  SBE prophylaxis was in order for dental and surgical procedures.          Laboratory work obtained on October 4th, 2018 showed a T4 normal at 9.6 mcg/dL, T3 at the lower limit normal 2.3 pg/mL, TSH at the lower limit of normal at 0.414 micro international units per mL, cholesterol was 119 mg/dL, triglycerides 59 mg per dL, HDL was low at 34 mg/dL, LDL 73.2 mg/dL.  C3 complement was now normal at 145 mg/dL, high sensitivity CRP was now normal at 2 point 7 5 mg per dL, and BNP was elevated at 178 pg/mL which had improved.  The CMP was normal except for an elevated total bilirubin of 1.8 mg/dL which had improved.  The CBC was normal with a hemoglobin of 12.4 grams/deciliter and hematocrit 38.5%.     Sean was seen in the [facility] on February 14, 2019.  Since her clinic visit 4 months prior, or near has been feeling well.  She is exercising, which consists doing some classes, rides a bicycle for 30 min, and works with a , and her exercise capacity has improved.  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 She reports a good energy level which has improved.  She denies chest pain, shortness of breath or palpitations.  She does notice swelling of her lower legs when working her 12 hr shifts, during which time she is on her feet quite a bit.     Sean is taking Lasix 40 mg p.o. q.day, to which she has a good diuretic response, metoprolol tartrate 50 mg p.o. b.i.d., enalapril 5 mg in the morning and 2.5 mg in the evening, coenzyme Q10 200 mg p.o. b.i.d., Synthroid 137 mcg p.o. q.day, Lipitor 20 mg p.o. q.day, cetirizine 10 mg p.o. q.day, fish oil and a multivitamin.  She has stopped taking Singulair.     Physical exam revealed an obese, pleasant and healthy-appearing young woman in no acute distress.  Vital signs showed a height of 5 ft 9 in or 175.3 cm, and weight of 112.4 kg or 247 lb 13 oz, which is a 3 kg weight increase  since her previous clinic visit.  Blood pressure was 117/63 mm of mercury in the right arm, pulsed oximetry in room air 97%, and pulse was 71 beats per min.     Examination of the skin showed it to be pink and well perfused.  The lungs were clear.  Palpation of the precordium showed to be normal with a well-healed midline scar.  First heart sound was normal and 2nd heart sound was split.  There was a grade 2-3/6 systolic ejection murmur heard best at the left upper sternal border with minimal radiation.  There was also a grade 2/6 diastolic murmur heard at the left lower sternal border, and also probably a diastolic rumble.  The liver edge was 2 cm below the right costal margin, unchanged.  The pacemaker was palpated in the upper mid abdomen.  Pulses were 2+ bilaterally.  There was no peripheral edema.     An EKG was obtained which showed an atrially paced rhythm at 70 beats per minute, with a long PVR interval of 344 milliseconds.  There was an RV conduction delay, and negative T-waves in leads 1, 2, V5 and V6 which were unchanged.  Compared to the previous study, the patient is now atrially paced.     An echocardiogram was obtained which had very poor two-dimensional imaging, secondary to a different machine being use, and patient obesity.  Chamber sizes were made on 2 dimensional imaging as M-mode measurements were suboptimal.  The interventricular septum measured 10 and the left ventricular posterior wall 11 mm, no change.  The right ventricular dimension in the long axis view was 32 mm, improved (35 mm).  The left ventricular internal dimension diastole was 53 and in systole 40 mm giving a calculated ejection fraction of 49%, showing mild LV dysfunction which has decreased (previously 59%).  The left atrium was dilated measuring 45 mm, unchanged.  The aortic root measured 31 mm, unchanged.  The ascending aorta measured 25 mm.  The pulmonary valve was difficult to image, and the posterior leaflet was thickened  and had decreased motion, and the pulmonary valve annulus was 22 mm.  In the four-chamber view, the left atrium measured 56 x 52 mm.  The right atrium measured 52 x 45 mm, unchanged.  The tricuspid valve was poorly seen.  Subcostal views could not be obtained.  The aortic arch was left-sided and unobstructed.  Doppler analysis using pulse, continuous and color-flow:  Mitral insufficiency was present which was mild, a new finding.  Pulmonary insufficiency was present which was mild with a end-diastolic gradient of 4 mm of mercury, unchanged.  The gradient across the bioprosthetic pulmonary valve was 37 mm of mercury peak, which has increased since the previous value (30 mm of mercury).  The gradient across the tricuspid valve was 13 peak and 6 mean mm of mercury, similar to previous value.  The E to a ratio across the mitral valve was 1.52.  Tricuspid insufficiency was present which was trivial.  No aortic stenosis or aortic insufficiency.  Impression:  Very poor imaging probably secondary to technical difficulties as well as patient obesity, leading to difficulty imaging and measuring all cardiac structures.  Tetralogy of Fallot status post pulmonary valve replacement.  Normal size right ventricle which has increased since the previous study, with subjectively normal right ventricular function.  Mildly decreased left ventricular function with ejection fraction 49%.  Significantly dilated left atrium and right atrium, unchanged.  Decreased motion and thickening of the posterior leaflet of the bioprosthetic pulmonary valve, with a 37 mm Hg gradient which has increased.  New mild mitral insufficiency.  Mild pulmonary insufficiency.  Mild to moderate tricuspid stenosis, unchanged.       Laboratory values were obtained which showed a normal CBC with a hemoglobin of 13 grams/deciliter and a hematocrit of 40%.  The free T4 was normal at 1.4, and the CMP was normal except for a mildly elevated bilirubin of 1.8, unchanged.   The BNP was mildly elevated at 156 pg/mL, unchanged.  The high density CRP was normal at 2.24.  The free T3, TSH, vitamin-D and vitamin-B levels were pending.  These values subsequently showed free T3 was mildly decreased at 2.2 pg/mL, vitamin D was decreased at 16 ng/mL, cholesterol 131 mg/dL, HDL was decreased at 30.6 mg/dL, vitamin B12 normal at 923 pg/mL.  (She was subsequently placed on vitamin D 62109 units p.o. Q.week.)     Sean was also evaluated by electrophysiologist Dr. Chintan Cabrera.  Dr. Cabrera found that there were no new arrhythmias and the pacemaker was functioning well.  He did not recommend any changes.  Please see Dr. Cabrera note for more details.     My impression from this visit was that Sean may have had a decrease in left ventricular function the etiology of which is not evident, but the quality of the echocardiogram was so poor that I feel this needs to be repeated on a better quality machine.  Her right ventricular size and function are normal.  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  Her bioprosthetic pulmonary valve gradient has increased from 30-37 mm of mercury peak, and continues to have a posterior leaflet which moves poorly.  The previous inflammatory process that occurred last year has resolved, as evidenced by a return of her CRP to normal.  I wondered whether this participated in the development of her constrictive pericarditis, although she has another reason to have this from her epicardial AICD patch.     Sean desires to become pregnant within the next few months.  In terms of tolerating her pregnancy, her biggest risk factor is the constrictive pericarditis.  I still feel that the stenotic bioprosthetic pulmonary valve is contributing to her cardiac dysfunction, and that she should have a Serina valve placed prior to pregnancy.  I feel she will be at moderate risk for pregnancy  but should be able to tolerated with careful monitoring from the Cardiology and Maternal-Fetal services.     Despite these issues, Sean continues to improve clinically, as evidenced by improved energy and exercise tolerance.  I am attributing this to her weight loss and exercise training.  Therefore, Sean was instructed to stay on the same doses of her medications.  I have asked her to return to this clinic in 1 months time with an EKG, an echocardiogram on a better quality machine, and an exercise stress test.  Following that I will present her in conference for Serina valve placement prior to pregnancy.  I will also arrange for her to have a maternal fetal evaluation in the future.  (As mentioned above, she was also subsequently placed on vitamin D3 11721 units p.o. q.week).     Sean Baez was seen in the Adult with [facility] on April 4, 2019.  She is coming in today because the echocardiogram performed at her previous visit was inadequate, and another study is scheduled on a new machine.  Additionally, she is being assessed for suitability of pregnancy.  Additionally, an exercise stress test is scheduled for this visit.  Since her previous clinic visit in February, 2019, or near states that she has been feeling well.  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  She continues to exercise with a , does aerobic exercise for almost an hour and does some mild weight training.  She denies chest pain, palpitations, shortness of breath, swelling or dizziness.  Her father was recently diagnosed with diabetes mellitus.     Sean is currently taking metoprolol 50 mg p.o. b.i.d., enalapril 5 mg in the morning and 2.5 mg in the evening, Lasix 40 mg p.o. q.day, Synthroid 137 mcg p.o. q.day, BuSpar 10 mg p.o. b.i.d., fish oil, multivitamin, coenzyme Q10 200 mg p.o. b.i.d.,  cetirizine 10 mg PO q.day, atorvastatin 20 mg PO q.day, and vitamin D 09935 units p.o. q.week.     Physical exam revealed an obese, pleasant young woman in no acute distress.  Vital signs showed a height of 175.3 cm or 5 ft 9 in, and weight of 112.8 kg or 248 lb 9 oz, unchanged.  Blood pressure in the right arm was 117/78 mm of mercury, pulse oximetry in room air 99% and pulse 95 beats per min.     Examination of the skin showed it to be pink and well perfused.  The lungs were clear.  Palpation of the precordium showed to be normal with a well-healed midline scar.  First heart sound was normal and 2nd heart sound was widely split.  There was a grade 3/6 harsh systolic ejection murmur heard best at the left upper sternal border, and well over the precordium.  There was also a 1/6 diastolic murmur heard along the left lower sternal border.  The liver edge was at the right costal margin, improved and the pacemaker was palpated in the abdomen.  Pulses were 2+ bilaterally. There was no peripheral edema.     An EKG was obtained which showed sinus rhythm with first-degree block at 80 beats per minute, right ventricular hypertrophy, and T-wave inversions in leads 1 2 and V1 suggestive of ischemia, no change.     An echocardiogram was obtained which showed tetralogy of Fallot status post repair with a bioprosthetic valve in the pulmonary position.  M-mode parameters were as follows:  The aortic root measured 32 mm (Z 0.9) unchanged and the left atrium measured 44 mm (Z 0.9) unchanged.  The aortic annulus measured 23 mm (Z 1.3).  The interventricular septum measured 10 mm (Z-0.5) and the left ventricular posterior wall 10 mm (Z-0.2), both unchanged.  The left ventricular internal dimension diastole measured 53 mm and in systole 38 mm, giving a calculated ejection fraction of 54%.  The ejection fraction was borderline low but has improved since the previous study (LV EF 49%).  The right ventricle measured 35 mm (Z 0.6)  increased from the previous study (32 mm).  The tricuspid valve domed in diastole with what appear to be septal leaflet for shortening.  The tricuspid valve annulus measured 28 mm and the mitral valve annulus 34 mm.  A bioprosthetic valve was in the pulmonary position with the posterior leaflet frozen and the anterior leaflet moving.  The right pulmonary artery measured 12 mm proximally (Z-1.4) and 18 mm distally.  The left pulmonary artery measured 15 mm proximally (Z-0.6) and 20 mm distally.  Right ventricular function was judged subjectively to be normal.  The right atrium was dilated and measured 57 x 52 mm which is increased since the previous study (52 x 45 mm).  The atrial septum is intact.  The inferior vena cava drain normally to the right atrium and measured 15 mm in diameter.  The right superior vena cava drain normally to the right atrium.  The aortic arch was left-sided and unobstructed.  Doppler analysis using pulsed, continuous and color-flow:  Tricuspid stenosis was present with an 11 peak and 6 mean mm Hg gradient, mild and unchanged.  Tricuspid insufficiency was present which was trace, with a gradient of 36 mm of mercury, indicating mildly elevated right ventricular pressures which has increased since the previous study (23 mm Hg).  Pulmonary insufficiency was present which was mild.  The gradient across the bioprosthetic pulmonary valve was 44 mm of mercury peak and 24 mm of mercury mean indicating mild-to-moderate obstruction which has increased since the previous study (37 mm Hg peak).  Mitral insufficiency was present which was mild, unchanged.  No aortic stenosis or aortic insufficiency.  Bidirectional flow in the patent veins indicated right atrial hypertension.  Impression:  Tetralogy of Fallot status post complete repair and placement of a bioprosthetic valve in the pulmonary position.  Good right ventricular function.  Stenotic bioprosthetic pulmonary valve with frozen posterior leaflet,  and a gradient across it which has increased to 44 peak and 24 mean mm of mercury.  Mild pulmonary insufficiency which is unchanged.  Normal branch pulmonary arteries.  Dilated right atrium measuring 57 x 52 mm which has increased. Borderline low left ventricular function which has improved to an ejection fraction of 54%.  Dilated left atrium measuring 44 mm, unchanged.  Mild tricuspid stenosis with 11 peak and 6 mean mm Hg gradient, unchanged.     An exercise stress test was obtained which showed that [place] exercise 8 min and 1 sec of the Sam protocol, demonstrating low level of exercise for age, but improved since the previous study. Baseline heart rate was 67 beats per minute, increased to 142 beats per minute at peak exercise (patient on beta-blocker), before decreasing back to 76 beats per minute after 7 min of recovery. Baseline blood pressure was 110/73 mm of mercury, increased to 146/84 mm of mercury 1 min into recovery, before decreasing back to 116/78 mm of mercury after 7 min of recovery.  Patient remained fully saturated between 98 and 100% throughout exercise and recovery.  Baseline rhythm was sinus rhythm.  Occasional to frequent unifocal PVCs started in stage I and continued into the beginning of stage II.  No PVCs were present during the latter part of stage II, the first 2 min of stage III, and all of recovery.  There were baseline T-wave inversions in leads I and II, and T-wave flattening in leads AVF, V5 and V6.  These changes did not progress throughout exercise and there were no ST segment depressions.  Impression:  Low level of exercise for age but improved since the previous study.  Adequate heart rate response to exercise on beta blocker and adequate blood pressure response to exercise.  Occasional to frequent unifocal PVCs during stage I and II which are than suppressed by exercise and do not recur.  Baseline T-wave inversions in leads I and II, and T-wave flattening in leads AVF, V5 and  V6 which do not progress, nor are there any ST segment depressions with exercise.  Normal arterial saturations.     My impression from this visit is that Sean has a poorly functioning bioprosthetic pulmonary valve that should be replaced prior to her pregnancy.  She also has constrictive pericarditis as evidence today with her dilated right atrium and left atrium, which is the larger threat to her pregnancy, however she has enough risk factors that I feel any hemodynamic abnormalities that can be corrected at a relatively low risk prior to pregnancy should be performed.  My impression is that Sean's overall cardiac condition has improved, probably secondary to her exercise regimen and medical management, rendering her a better candidate for pregnancy.  She has mild tricuspid stenosis which is unchanged.  Her pacemaker is functioning well and her rhythm is controlled on her current dose of metoprolol.     For the above reasons, I will present Steven in cardiac catheterization/surgery Conference for placement of a Serina valve prior to pregnancy.  I have also referred her for a high risk maternal fetal evaluation. Sean was instructed to continue on the same doses of these medications.  She was educated that the enalapril must be stopped prior to pregnancy and we will replace it with a different vaso dilator.  She should continue with her exercise regimen and attempt to lose more weight.  I have asked her to make another appointment in this clinic in about 4 months during our pacemaker Clinic, where her pacemaker and rhythm will be evaluated, and at that time we will obtain some laboratory values, including a repeat vitamin D level.       Further disposition for the cardiac status of Sean Baez will be determined following the discussion of her case in catheterization conference, her evaluation by the high risk maternal fetal team, and her repeat evaluation in pacemaker clinic in 4 months.  COLBY  prophylaxis continues to be in order for dental and surgical procedures.    Sean was subsequently presented to the Pediatric Cardiology, Cardiac surgery conference where it was agreed that she would benefit from a Serina valve placement.    Sean Baez was seen in the Adult with Congenital Heart Disease Clinic on July 29, 2019.  Since her previous clinic visit in April, 2019, Sean has generally been feeling well.  She has been studying hard for her internal medicine boards in September.  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  She continues working out 3 times a week on her off weeks, which includes lifting some weights, doing cardio training, and working with a .  She denies chest pain or shortness of breath.  She has rare palpitations of 3 beats in duration, which he notices while lying down about once a week.  She notices mild ankle swelling at the end of her shift.  Her last remote pacemaker interrogation was in May, 2019, which showed no episodes of tachyarrhythmias.  Sean reports a good energy level which has not changed since her previous visit.    Sean is currently taking metoprolol tartrate 50 mg p.o. b.i.d., enalapril 5 mg in the morning and 2.5 mg at night, coenzyme Q10 100 mg p.o. b.i.d., Synthroid 137 mcg p.o. q.day parentheses about to be decreased to 125 mcg a day based on laboratory work), aspirin 81 mg p.o. q.day, furosemide 40 mg p.o. q.day, atorvastatin 20 mg p.o. q.day, BuSpar 10 mg p.o. b.i.d., singular 10 mg p.o. q.day (restarted for allergies) fish oil, multivitamin, and vitamin-D 04413 units p.o. q.week.    Physical exam revealed a healthy-appearing, pleasant, obese young woman in no acute distress.  Vital signs showed a height of 175.3 cm or 5 ft 9 in and weight of 112.9 kg or 249 lb, unchanged.  Blood pressure in the right arm was 114/79 mm of mercury, pulse oximetry in room air 96%, and pulse 87 beats per  min.    Examination of the skin showed it to be pink and well perfused.  The lungs were clear.  Palpation of the precordium was normal, with a well-healed midline scar and the pacemaker palpated in the upper abdomen.  First heart sound was normal and 2nd heart sound was widely split.  There was a grade 3/6 somewhat harsh systolic ejection murmur heard best at the left midsternal border and heard well along the left sternal border.  Diastole was clear which has improved (diastolic murmur heard previously).  The liver edge was 1 cm below the right costal margin which has improved.  Pulses were 2+ bilaterally.  There was no peripheral edema.    EKG showed paced atrial rhythm at 71 beats per minute, with right ventricular hypertrophy, and T-wave inversions in leads 1, 2, V5 and V6 suggestive of ischemia, and the EKG is unchanged.    An echocardiogram was obtained which showed evidence for repair of tetralogy of Fallot, with a bioprosthetic valve in the pulmonary position.  M-mode parameters were as follows:  The aortic root measured 31 mm, unchanged and the left atrium was dilated and measured 45 mm, also unchanged.  The aortic annulus was normal at 21 mm.  The interventricular septum measured 11 and the left ventricular posterior wall 11 mm both of which were normal.  The left ventricular internal dimension in diastole was 54 mm and in systole 38 mm giving a calculated ejection fraction of 55%, indicating left ventricular function at the lower limit of normal, unchanged.  A bioprosthetic valve was present in the pulmonary position with an annulus measuring 17 mm, small for the patient's size.  Both leaflets were thickened, the posterior leaflet was frozen, and the anterior leaflet had decreased excursion.  The right pulmonary artery was not well seen and the left pulmonary artery measured 18 mm in diameter.  The right atrium was dilated and measured 58 x 57 mm in diameter which has increased (previously 57 x 52 mm in  diameter).  The tricuspid leaflets were not well seen but did dome in diastole and the tricuspid valve annulus was 26 mm, unchanged.  The mitral valve had normal leaflet motion and the annulus was 33 mm.  The ventricular septal defect patch was in proper position covering a previous malaligned defect.  The atrial septum was intact.  The inferior and superior vena cava drain normally to the right atrium.  The aortic arch was left-sided and unobstructed.  Doppler analysis using pulsed, continuous and color-flow:  Turbulence was detected across the tricuspid valve with a gradient of 12 peak and 6 mean mm of mercury indicating mild to moderate tricuspid valve stenosis, unchanged.  The gradient across the bioprosthetic pulmonary valve was 42 peak and 22 mean mm of mercury indicating mild change pulmonary insufficiency was present which was moderate as indicated by retrograde flow in the main pulmonary artery, and had increased.  The pulmonary artery diastolic pressure was 5 mm of mercury.  Tricuspid insufficiency was present which was physiologic with a gradient of 32 mm of mercury.  Mitral insufficiency was trace.  There was no aortic stenosis or aortic insufficiency.  Retrograde flow was present in the hepatic veins, unchanged.  Impression:  Tetralogy of Fallot status post pulmonary valve replacement.  Stenotic and poorly functioning bioprosthetic pulmonary valve with frozen posterior leaflet, 42 peak and 22 mean mm Hg gradient, and pulmonary insufficiency which has increased to moderate.  Normal right ventricular size and function.  Low normal left ventricular function, unchanged.  Significantly dilated right atrium which has increased.  Dilated left atrium stable.  Mild tricuspid valve stenosis with doming leaflets, unchanged.  Unobstructed left aortic arch.    Sean was sent for laboratory work, which at the time of this dictation showed:  CBC had hemoglobin 13.7 and hematocrit 41.9%, with a mildly elevated  platelet count of [id number]; BNP was mildly elevated at 141 pg/mL, improved.  The comprehensive metabolic profile, vitamin-D level and lipid profile are pending.     My impression from this visit, as it has been in the past, is that Sean requires a pulmonary valve replacement for increasing pulmonary valve stenosis and pulmonary valve insufficiency. Sean will not follow the standard criteria for pulmonary valve replacement, since I believe her right ventricle is not able to dilate secondary to constrictive pericarditis.  Hopefully Sean can have a Serina valve replacement, and I feel that this can wait until after she has taken her internal medicine boards, which will be in September.  Her tricuspid valve stenosis is not significant enough to require valve replacement, in my opinion.  I believe her constrictive pericarditis is stable, and her symptoms have certainly improved with diuresis, exercise and weight loss.  She needs repeat lab work to monitor her vitamin-D and cholesterol levels after treatment.  In my opinion, once she has a well-functioning pulmonary valve, that she will be a reasonable candidate for pregnancy.  I will refer her to the high risk maternal fetal team for evaluation following her internal medicine boards.  Until then, I have instructed Sean to remain on the same doses of her medications.  It is noted that her Synthroid has been decreased. Sean was instructed to return to this clinic during our pacemaker clinic, with an EKG, echocardiogram, and pacemaker evaluation.    The vitamin-D level was subsequently found to be decreased at 26 ng/mL (previously 16 ng per mL), and lipid profile showed cholesterol 140 mg/dL, triglycerides 56 mg/dL, HDL 40 mg/dL, an LDL cholesterol 89 mg/dL.  Sean was advised to continue her vitamin-D supplement and her atorvastatin.      Sean Baez was seen in the Adult with Congenital Heart Disease Clinic on October 3, 2019.  Since her previous clinic  visit in July, 2019, Sean was accepted for a percutaneous pulmonary valve replacement, which is now been scheduled for December 3, 2019.  Sean was also evaluated by maternal fetal specialist Dr. Grande, who also felt that Sean should undergo a pulmonary valve replacement prior to undergoing pregnancy.  She listed the potential complications associated with pacemaker, constrictive pericarditis, arrhythmia, and advanced maternal age even after the valve was replaced successfully, and estimated a 15-20% complication rate.  Following this visit I advised Sean to decrease her enalapril from 7.5 mg to 5 mg q.day, since enalapril is teratogenic.  She was to monitor her blood pressure at home.  The purpose of this visit is to evaluate her response to decreased dose of enalapril, re-evaluate her in preparation for the pulmonary valve replacement, and download her pacemaker.      Since her previous clinic visit in July, 2019, Sean states she is feeling well.  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  She exercises at a gym 4 times a week on her non working weeks.  She was recently diagnosed with lactose intolerance and has stopped eating dairy foods.  She reports a good energy level.  She denies chest pain, shortness of breath or swelling.  She continues to have palpitations about once or twice a week, while lying down at night, which consists of 3-4 beats lasting 1-2 seconds.  This is unchanged.  She also complains of episodes of dizziness which occur during the day about once every 2 weeks, lasting a few seconds, and which are not associated with palpitations.  She has been taking her blood pressures at home, which have ranged in the 1 teens over 60s to 70s.    Sean is currently taking enalapril which was decreased to 5 mg PO q.day, metoprolol tartrate 50 mg p.o. b.i.d., atorvastatin 20 mg PO q.day, Synthroid 125 mcg p.o.  q.day, vitamin D 12659 units p.o. q.week, Co Q10 100 mg p.o. b.i.d., BuSpar 10 mg p.o. b.i.d., singular 10 mg p.o. q p.m., Zyrtec 10 mg p.o. q.a.m., fish oil, multivitamin, and Lasix 40 mg PO q.day to which she has a good diuretic response.    Physical exam revealed an obese, pleasant and healthy-appearing young woman in no acute distress.  Vital signs showed a height of 175.3 cm or 5 ft 9 in, and weight of 112.8 kg or 248 lb 9 oz, unchanged.  Blood pressure in the right arm was 111/71 mm of mercury, pulse oximetry in room air 98%, and pulse 91 beats per min.        Examination of the skin showed it to be pink and well perfused.  The lungs were clear.  Palpation of the precordium showed a well-healed midline scar and was otherwise normal.  First heart sound was normal and 2nd heart sound narrowly split.  There was a grade 2-3/6 systolic ejection murmur heard best at the left midsternal border and more faintly over the precordium.  There was also a 2/6 diastolic murmur heard at the left midsternal border only.  The liver edge was 1 cm below the right costal margin.  The pacemaker was palpated in the upper abdomen.  Pulses were 2+ bilaterally.  There was no peripheral edema.    An EKG was obtained which showed a paced atrial rhythm with prolonged AV conduction 368 milliseconds, right ventricular hypertrophy, and negative T-waves in the inferior and left precordial leads suggestive of ischemia.  The EKG is unchanged.    An echocardiogram was obtained which showed evidence for repair of tetralogy of Fallot.  M-mode parameters were as follows:  The aortic root was mildly dilated measuring 30 mm, unchanged.  The left atrium was significantly dilated measuring 42 mm, slightly improved (previously 45 mm).  The right ventricle was normal size and measured 27 mm, unchanged.  Right ventricular systolic function was judged subjectively to be normal, but not seen well enough to quantitate.  The interventricular septum measured  12, and the left ventricular posterior wall 12 mm in diameter at the upper limits of normal.  The left ventricular internal dimension in diastole was 54 mm and in systole 36 mm giving a calculated ejection fraction of 63%, and these numbers are normal.  The ventricular septal defect patch was in proper position over the malaligned defect.  A bioprosthetic valve was seen in the pulmonary position with the anterior leaflet frozen and the posterior leaflet thickened and moving poorly, with an annulus of 15 mm, which may have been an underestimate.  The pulmonary arteries were of normal diameter with the right pulmonary artery measuring 14 mm and the left pulmonary artery 15 mm.  There was no pericardial effusion.  The septal leaflet of the tricuspid valve had decreased excursion with the tricuspid valve annulus only measuring 21 mm, compared to the mitral valve annulus measuring 40 mm.  The right atrium was significantly dilated measuring 56 x 53 mm, slightly improved (previously 58 x 57 mm).  Both the inferior and superior vena cava drain normally to the right atrium.  The left aortic arch was unobstructed.  Doppler analysis using pulse, continuous and color-flow:  Tricuspid stenosis was present with a 13 peak and 7 mean mm Hg gradient across the valve indicating moderate obstruction, unchanged.  Tricuspid insufficiency was present which was mild, with a 32 mm Hg gradient indicating mildly elevated RV pressures which was probably an underestimate.  The gradient across the bioprosthetic pulmonary valve was 38 mm Hg peak, unchanged.  There was moderate to moderately severe pulmonary insufficiency which has increased.  Mild mitral regurgitation, unchanged.  No aortic insufficiency or aortic stenosis.  No residual ventricular septal defect.  Impression:  Tetralogy of Fallot status post pulmonary valve replacement.  Sten out a pulmonary valve with frozen anterior leaflet and thickened posterior leaflet with poor excursion,  38 mm Hg peak gradient and moderate to moderately severe pulmonary insufficiency.  Subjectively normal right ventricular function although difficult to quantitate.  Normal size right ventricle.  Dilated left atrium, improved and dilated right atrium, also improved.  Tricuspid stenosis due to poor excursion of the septal leaflet with a 13 peak and 7 mean mm Hg gradient indicating moderate obstruction, unchanged.  Mild mitral regurgitation.  Normal left ventricular function.    A pacemaker download was performed which showed no arrhythmias, and 9 years left of battery life.  A review of her remote download from August 25, 2019, showed a 5 beat run of supraventricular tachycardia on July 15, 2019.    My impression from this visit was that Sean requires a pulmonary valve replacement and this is now scheduled for December 3, 2019 by Dr. Justice.  I believe she will have improvement in cardiac output and exercise capacity following this procedure.  Sean continues to have normal left ventricular size and function, although the negative T-wave throughout the inferior an left precordial leads suggests that she continues to have some myocardial ischemia, which is unchanged.  Sean continues to complain of palpitations, which are probably short episodes of supraventricular tachycardia, one of which was documented on her last remote pacemaker evaluation.  I am wondering whether this is secondary to the singular, which she takes at night.  For that reason, she should stop this medication if tolerated, to see if her palpitations improved. 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  Since her atria are smaller, and she is feeling better, this may slowly be improving which would be an excellent prognostic factor for her upcoming pregnancy.  Since Sean is planning pregnancy, and since her blood pressure is under good control, I would like to  continue to taper the enalapril.  If she cannot come off enalapril than I will switch her to methyldopa for her pregnancy.  I also believe that Sean may be able to decrease her diuretic, since she does not have peripheral edema and since she continues to have a brisk diuretic response to her current dose.    Therefore, Sean was instructed to decrease her enalapril from 5 mg to 2.5 mg PO q.day, and continue to monitor her blood pressure at home.  She should stop her singular and see whether her palpitations are improving.  She should decrease her Lasix to every other day about 1 week later, and monitor herself for signs of peripheral edema.  She should begin taking prenatal vitamins.  These recommendations may change following her pulmonary valve placement.  She was asked to make an appointment in this clinic 1 week following her pulmonary valve placement.    Further disposition for the cardiac status of  Sean Baez will be determined following the her pulmonary valve placement scheduled for December 3rd, her blood pressure response to the decreased dose of enalapril, her response to the decreased dose of Lasix, and her response to stopping her singular.  SBE prophylaxis continues to be in order for dental and surgical procedures.    Thank you very much for allowing up to participate the care of your patient.       Sincerely      Chantel Saleh       All Flowsheet Templates

## 2019-10-24 DIAGNOSIS — Q21.3 TOF (TETRALOGY OF FALLOT): ICD-10-CM

## 2019-10-24 DIAGNOSIS — I47.20 VT (VENTRICULAR TACHYCARDIA): Primary | ICD-10-CM

## 2019-10-24 DIAGNOSIS — I49.5 SINUS NODE DYSFUNCTION: ICD-10-CM

## 2019-10-24 DIAGNOSIS — Q24.9 ADULT CONGENITAL HEART DISEASE: ICD-10-CM

## 2019-10-30 RX ORDER — ATORVASTATIN CALCIUM 20 MG/1
TABLET, FILM COATED ORAL
Qty: 30 TABLET | Refills: 0 | Status: SHIPPED | OUTPATIENT
Start: 2019-10-30 | End: 2019-11-26 | Stop reason: SDUPTHER

## 2019-10-31 RX ORDER — ATORVASTATIN CALCIUM 20 MG/1
TABLET, FILM COATED ORAL
Qty: 30 TABLET | Refills: 0 | OUTPATIENT
Start: 2019-10-31

## 2019-11-26 RX ORDER — ATORVASTATIN CALCIUM 20 MG/1
TABLET, FILM COATED ORAL
Qty: 30 TABLET | Refills: 0 | Status: SHIPPED | OUTPATIENT
Start: 2019-11-26 | End: 2020-01-06

## 2019-12-02 ENCOUNTER — ANESTHESIA EVENT (OUTPATIENT)
Dept: MEDSURG UNIT | Facility: HOSPITAL | Age: 34
DRG: 267 | End: 2019-12-02
Payer: COMMERCIAL

## 2019-12-02 NOTE — ANESTHESIA PREPROCEDURE EVALUATION
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    Patient presents for the above procedure(s).    SHALINI 4/26/2018  Technnically difficult study.  There is at most mild right ventricular dilatation.  Normal left ventricle structure and size.  Normal right ventricular systolic function.  Normal left ventricular systolic function.  No pericardial effusion.  No atrial shunt.  No ventricular shunt.  Mild tricuspid valve prolapse.  There appears to be some tethering of the tricuspid valve septal leaflet.  Normal tricuspid valve velocity.  Mild tricuspid valve insufficiency.  Right ventricle systolic pressure estimate normal.  The posterior leaflet of the prosthetic pulmonary valve appears to be immobile.  A peak gradient of 17 mm Hg is obtained across the prosthetic pulmonary valve.  There appears to be at most mild pulmonary valve insufficiency.    Prev airway:   Present Prior to Hospital Arrival?: No; Placement Date: 04/26/18; Placement Time: 1044; Method of Intubation: Direct laryngoscopy; Inserted by: CRNA; Airway Device: Endotracheal Tube; Mask Ventilation: Easy; Intubated: Postinduction; Blade:  Virgilio #2; Airway Device Size: 7.5; Style: Cuffed; Cuff Inflation: Minimal occlusive pressure; Inflation Amount: 5; Placement Verified By: Auscultation, Capnometry, ETT Condensation; Grade: Grade I; Complicating Factors: Obesity; Intubation Findings: Positive EtCO2, Bilateral breath sounds, Atraumatic/Condition of teeth unchanged;  Depth of Insertion: 22; Securment: Lips; Complications: None; Breath Sounds: Equal Bilateral; Insertion Attempts: 1; Removal Date: 04/26/18;  Removal Time: 1243    Oxygen/Ventilation Requirements:  On room air       Patient Active Problem List   Diagnosis    Tetralogy of Fallot s/p repair    S/P pulmonary valve replacement    VT (ventricular tachycardia)    AICD (automatic cardioverter/defibrillator) present    Tricuspid stenosis    Sinus node dysfunction    Enlarged RV (right ventricle)    Constrictive cardiomyopathy    Adult congenital heart disease    Hypothyroidism    TOF (tetralogy of Fallot)    Pulmonary stenosis    Pulmonary insufficiency    PAC (premature atrial contraction)       Review of patient's allergies indicates:  No Known Allergies    Outpatient Medications:  No current facility-administered medications on file prior to encounter.      Current Outpatient Medications on File Prior to Encounter   Medication Sig Dispense Refill    coQ10, ubiquinol, 100 mg Cap Take 200 mg by mouth 2 (two) times daily. 62 capsule 3    enalapril (VASOTEC) 5 MG tablet Take 1.5 tablets (7.5 mg total) by mouth once daily. (Patient taking differently: Take 5 mg by mouth once daily. ) 60 tablet 6    ergocalciferol (ERGOCALCIFEROL) 50,000 unit Cap Take 1 capsule (50,000 Units total) by mouth every 7 days. 12 capsule 1    levocetirizine (XYZAL) 5 MG tablet Take 5 mg by mouth once daily.       levothyroxine (SYNTHROID) 150 MCG tablet Take 1 tablet (150 mcg total) by mouth before breakfast. (Patient taking differently: Take 125 mcg by mouth before breakfast. ) 30 tablet 6     metoprolol succinate (TOPROL-XL) 25 MG 24 hr tablet Take 1 tablet (25 mg total) by mouth once daily. (Patient taking differently: Take 50 mg by mouth 2 (two) times daily. ) 30 tablet 11    metoprolol tartrate (LOPRESSOR) 50 MG tablet TAKE 1 TABLET(50 MG) BY MOUTH TWICE DAILY 62 tablet 0    montelukast (SINGULAIR) 10 mg tablet Take 10 mg by mouth every evening.          Current Inpatient Medications:      Past Surgical History:   Procedure Laterality Date    ICD      pulmonary valve replacement         Social History     Socioeconomic History    Marital status: Single     Spouse name: Not on file    Number of children: Not on file    Years of education: Not on file    Highest education level: Not on file   Occupational History    Not on file   Social Needs    Financial resource strain: Not on file    Food insecurity:     Worry: Not on file     Inability: Not on file    Transportation needs:     Medical: Not on file     Non-medical: Not on file   Tobacco Use    Smoking status: Never Smoker    Smokeless tobacco: Never Used   Substance and Sexual Activity    Alcohol use: Yes     Comment: socially    Drug use: No    Sexual activity: Not on file   Lifestyle    Physical activity:     Days per week: Not on file     Minutes per session: Not on file    Stress: Not on file   Relationships    Social connections:     Talks on phone: Not on file     Gets together: Not on file     Attends [employer] service: Not on file     Active member of club or organization: Not on file     Attends meetings of clubs or organizations: Not on file     Relationship status: Not on file   Other Topics Concern    Not on file   Social History Narrative    Physician at [employer].        OBJECTIVE:     Weight:  Wt Readings from Last 1 Encounters:   10/03/19 112.8 kg (248 lb 9.1 oz)       Recent Blood Pressure Readings:  BP Readings from Last 3 Encounters:   10/03/19 111/71   09/05/19 100/72   07/29/19 114/79        Vital Signs Range (Last 24H):         CBC:   Lab Results   Component Value Date    WBC 9.01 07/29/2019    HGB 13.7 07/29/2019    HCT 41.9 07/29/2019    MCV 85 07/29/2019     (H) 07/29/2019       CMP:     Chemistry        Component Value Date/Time     07/29/2019 1157    K 3.9 07/29/2019 1157     07/29/2019 1157    CO2 29 07/29/2019 1157    BUN 10 07/29/2019 1157    CREATININE 0.9 07/29/2019 1157    GLU 93 07/29/2019 1157        Component Value Date/Time    CALCIUM 10.3 07/29/2019 1157    ALKPHOS 100 07/29/2019 1157    AST 18 07/29/2019 1157    ALT 27 07/29/2019 1157    BILITOT 1.8 (H) 07/29/2019 1157    ESTGFRAFRICA >60 07/29/2019 1157    EGFRNONAA >60 07/29/2019 1157            INR:  Lab Results   Component Value Date    INR 1.0 04/26/2018       Diagnostic Studies:    EKG:   Results for orders placed or performed in visit on 10/03/19   IN OFFICE EKG 12-LEAD (to [place])    Collection Time: 10/03/19 10:10 AM    Narrative    Test Reason : Z87.74,Z95.810,Q21.3,Z95.810,    Vent. Rate : 064 BPM     Atrial Rate : 064 BPM     P-R Int : 368 ms          QRS Dur : 102 ms      QT Int : 396 ms       P-R-T Axes : 048 011 146 degrees     QTc Int : 408 ms    Atrial-paced rhythm with prolonged AV conduction  T wave abnormality, consider inferolateral ischemia  Abnormal ECG  When compared with ECG of 14-FEB-2019 12:01,  No significant change was found    Referred By:             Confirmed By:        2D Echo:  Results for orders placed or performed during the hospital encounter of 02/14/19   Transthoracic echo (TTE) complete (Cupid Only)   Result Value Ref Range    BSA 2.31 m2    PV PEAK VELOCITY 3.05 cm/s    LVIDD 5.29 3.5 - 6.0 cm    IVS 1.05 0.6 - 1.1 cm    PW 1.08 0.6 - 1.1 cm    LVIDS 3.97 2.1 - 4.0 cm    FS 25 28 - 44 %    Sinus 3.13 cm    STJ 2.53 cm    Ascending aorta 2.56 cm    LV mass 217.32 g    LA size 4.54 cm    RVDD 3.19 cm    Left Ventricle Relative Wall Thickness 0.41 cm    AV mean gradient 2.54  mmHg    E/A ratio 1.53     E wave decelartion time 180.20 msec    Ao peak mychal 1.04 m/s    Ao VTI 25.12 cm    AV peak gradient 4.33 mmHg    MV Peak E Mychal 0.98 m/s    TR Max Mychal 2.38 m/s    MV Peak A Mychal 0.64 m/s    LV Systolic Volume 68.64 mL    LV Systolic Volume Index 30.7 mL/m2    LV Diastolic Volume 134.95 mL    LV Diastolic Volume Index 60.35 mL/m2    LV Mass Index 97.2 g/m2    RA Major Axis 5.23 cm    Left Atrium Major Axis 5.56 cm    Triscuspid Valve Regurgitation Peak Gradient 22.66 mmHg    RA Width 4.48 cm     ASSESSMENT/PLAN:         Anesthesia Evaluation    I have reviewed the Patient Summary Reports.    I have reviewed the Nursing Notes.   I have reviewed the Medications.     Review of Systems  Anesthesia Hx:  No problems with previous Anesthesia  History of prior surgery of interest to airway management or planning: Denies Family Hx of Anesthesia complications.   Denies Personal Hx of Anesthesia complications.   Social:  Non-Smoker, No Alcohol Use    Hematology/Oncology:  Hematology Normal   Oncology Normal     EENT/Dental:EENT/Dental Normal   Cardiovascular:   Pacemaker Valvular problems/Murmurs ECG has been reviewed. TOF, cardiology notes and studies reviewed Functional Capacity good / => 4 METS   Congenital Heart Disease    Congenital Heart Disease:  Tetralogy of Fallot (TOF), s/p surgical repair, s/p total repair    Pulmonary:  Pulmonary Normal    Renal/:  Renal/ Normal     Hepatic/GI:  Hepatic/GI Normal    Neurological:  Neurology Normal    Endocrine:   Hypothyroidism Morbid obesity       Physical Exam  General:  Obesity, Well nourished    Airway/Jaw/Neck:  Airway Findings: Mouth Opening: Normal Tongue: Normal  General Airway Assessment: Adult  Mallampati: I  TM Distance: Normal, at least 6 cm  Jaw/Neck Findings:  Neck ROM: Normal ROM      Dental:  Dental Findings: In tact   Chest/Lungs:  Chest/Lungs Findings: Clear to auscultation, Normal Respiratory Rate     Heart/Vascular:  Heart Findings:  Rate: Normal  Rhythm: Regular Rhythm  Heart Murmur  Systolic  Systolic Heart Murmur Description: Holosystolic  Systolic Heart Murmur Grade: Grade III        Mental Status:  Mental Status Findings:  Cooperative, Alert and Oriented         Anesthesia Plan  Type of Anesthesia, risks & benefits discussed:  Anesthesia Type:  general  Patient's Preference:   Intra-op Monitoring Plan: standard ASA monitors  Intra-op Monitoring Plan Comments:   Post Op Pain Control Plan: multimodal analgesia, IV/PO Opioids PRN and per primary service following discharge from PACU  Post Op Pain Control Plan Comments:   Induction:   IV  Beta Blocker:  Patient is on a Beta-Blocker and has received one dose within the past 24 hours (No further documentation required).       Informed Consent: Patient understands risks and agrees with Anesthesia plan.  Questions answered. Anesthesia consent signed with patient.  ASA Score: 3     Day of Surgery Review of History & Physical:  There are no significant changes.  H&P update referred to the provider.         Ready For Surgery From Anesthesia Perspective.

## 2019-12-03 ENCOUNTER — ANESTHESIA (OUTPATIENT)
Dept: MEDSURG UNIT | Facility: HOSPITAL | Age: 34
DRG: 267 | End: 2019-12-03
Payer: COMMERCIAL

## 2019-12-03 ENCOUNTER — HOSPITAL ENCOUNTER (INPATIENT)
Facility: HOSPITAL | Age: 34
LOS: 1 days | Discharge: HOME | DRG: 267 | End: 2019-12-04
Attending: PEDIATRICS | Admitting: PEDIATRICS
Payer: COMMERCIAL

## 2019-12-03 DIAGNOSIS — J98.4 PULMONARY INSUFFICIENCY: ICD-10-CM

## 2019-12-03 DIAGNOSIS — Q24.9 ADULT CONGENITAL HEART DISEASE: ICD-10-CM

## 2019-12-03 DIAGNOSIS — Q21.3 TOF (TETRALOGY OF FALLOT): ICD-10-CM

## 2019-12-03 LAB
ABO + RH BLD: NORMAL
ANION GAP SERPL CALC-SCNC: 10 MMOL/L (ref 8–16)
B-HCG UR QL: NEGATIVE
BASOPHILS # BLD AUTO: 0.05 K/UL (ref 0–0.2)
BASOPHILS NFR BLD: 0.6 % (ref 0–1.9)
BLD GP AB SCN CELLS X3 SERPL QL: NORMAL
BUN SERPL-MCNC: 13 MG/DL (ref 6–20)
CALCIUM SERPL-MCNC: 9.2 MG/DL (ref 8.7–10.5)
CHLORIDE SERPL-SCNC: 106 MMOL/L (ref 95–110)
CO2 SERPL-SCNC: 24 MMOL/L (ref 23–29)
CREAT SERPL-MCNC: 0.9 MG/DL (ref 0.5–1.4)
CTP QC/QA: YES
DIFFERENTIAL METHOD: ABNORMAL
EOSINOPHIL # BLD AUTO: 0.1 K/UL (ref 0–0.5)
EOSINOPHIL NFR BLD: 0.6 % (ref 0–8)
ERYTHROCYTE [DISTWIDTH] IN BLOOD BY AUTOMATED COUNT: 13.2 % (ref 11.5–14.5)
EST. GFR  (AFRICAN AMERICAN): >60 ML/MIN/1.73 M^2
EST. GFR  (NON AFRICAN AMERICAN): >60 ML/MIN/1.73 M^2
GLUCOSE SERPL-MCNC: 81 MG/DL (ref 70–110)
HCT VFR BLD AUTO: 41.8 % (ref 37–48.5)
HGB BLD-MCNC: 13.2 G/DL (ref 12–16)
IMM GRANULOCYTES # BLD AUTO: 0.03 K/UL (ref 0–0.04)
IMM GRANULOCYTES NFR BLD AUTO: 0.4 % (ref 0–0.5)
LYMPHOCYTES # BLD AUTO: 2.7 K/UL (ref 1–4.8)
LYMPHOCYTES NFR BLD: 32.9 % (ref 18–48)
MCH RBC QN AUTO: 28.5 PG (ref 27–31)
MCHC RBC AUTO-ENTMCNC: 31.6 G/DL (ref 32–36)
MCV RBC AUTO: 90 FL (ref 82–98)
MONOCYTES # BLD AUTO: 0.4 K/UL (ref 0.3–1)
MONOCYTES NFR BLD: 5 % (ref 4–15)
NEUTROPHILS # BLD AUTO: 5 K/UL (ref 1.8–7.7)
NEUTROPHILS NFR BLD: 60.5 % (ref 38–73)
NRBC BLD-RTO: 0 /100 WBC
PLATELET # BLD AUTO: 291 K/UL (ref 150–350)
PMV BLD AUTO: 9.8 FL (ref 9.2–12.9)
POTASSIUM SERPL-SCNC: 4.4 MMOL/L (ref 3.5–5.1)
RBC # BLD AUTO: 4.63 M/UL (ref 4–5.4)
SODIUM SERPL-SCNC: 140 MMOL/L (ref 136–145)
WBC # BLD AUTO: 8.33 K/UL (ref 3.9–12.7)

## 2019-12-03 PROCEDURE — 33477 IMPLANT TCAT PULM VLV PERQ: CPT | Mod: 22,,, | Performed by: PEDIATRICS

## 2019-12-03 PROCEDURE — 25000003 PHARM REV CODE 250: Performed by: PEDIATRICS

## 2019-12-03 PROCEDURE — 81025 URINE PREGNANCY TEST: CPT | Performed by: PEDIATRICS

## 2019-12-03 PROCEDURE — D9220A PRA ANESTHESIA: Mod: CRNA,,, | Performed by: NURSE ANESTHETIST, CERTIFIED REGISTERED

## 2019-12-03 PROCEDURE — C1751 CATH, INF, PER/CENT/MIDLINE: HCPCS | Performed by: PEDIATRICS

## 2019-12-03 PROCEDURE — 27201423 OPTIME MED/SURG SUP & DEVICES STERILE SUPPLY: Performed by: PEDIATRICS

## 2019-12-03 PROCEDURE — C1894 INTRO/SHEATH, NON-LASER: HCPCS | Performed by: PEDIATRICS

## 2019-12-03 PROCEDURE — 25500020 PHARM REV CODE 255: Performed by: PEDIATRICS

## 2019-12-03 PROCEDURE — C1769 GUIDE WIRE: HCPCS | Performed by: PEDIATRICS

## 2019-12-03 PROCEDURE — 33477 PR IMPLANT TRASNCATH PULM VALVE, PERQ: ICD-10-PCS | Mod: 22,82,, | Performed by: PEDIATRICS

## 2019-12-03 PROCEDURE — 25000003 PHARM REV CODE 250: Performed by: NURSE ANESTHETIST, CERTIFIED REGISTERED

## 2019-12-03 PROCEDURE — 33477 IMPLANT TCAT PULM VLV PERQ: CPT | Mod: 22,82,, | Performed by: PEDIATRICS

## 2019-12-03 PROCEDURE — 82805 BLOOD GASES W/O2 SATURATION: CPT | Performed by: PEDIATRICS

## 2019-12-03 PROCEDURE — 63600175 PHARM REV CODE 636 W HCPCS: Performed by: NURSE ANESTHETIST, CERTIFIED REGISTERED

## 2019-12-03 PROCEDURE — 82330 ASSAY OF CALCIUM: CPT | Performed by: PEDIATRICS

## 2019-12-03 PROCEDURE — 93530 PR RT HEART CATH, CONGENITAL HEART ABNL: ICD-10-PCS | Mod: 26,59,, | Performed by: PEDIATRICS

## 2019-12-03 PROCEDURE — 27800903 OPTIME MED/SURG SUP & DEVICES OTHER IMPLANTS: Performed by: PEDIATRICS

## 2019-12-03 PROCEDURE — 37000008 HC ANESTHESIA 1ST 15 MINUTES: Performed by: PEDIATRICS

## 2019-12-03 PROCEDURE — 25000003 PHARM REV CODE 250: Performed by: ANESTHESIOLOGY

## 2019-12-03 PROCEDURE — D9220A PRA ANESTHESIA: ICD-10-PCS | Mod: CRNA,,, | Performed by: NURSE ANESTHETIST, CERTIFIED REGISTERED

## 2019-12-03 PROCEDURE — 63600175 PHARM REV CODE 636 W HCPCS: Performed by: ANESTHESIOLOGY

## 2019-12-03 PROCEDURE — 86850 RBC ANTIBODY SCREEN: CPT

## 2019-12-03 PROCEDURE — 82947 ASSAY GLUCOSE BLOOD QUANT: CPT | Performed by: PEDIATRICS

## 2019-12-03 PROCEDURE — 84132 ASSAY OF SERUM POTASSIUM: CPT | Performed by: PEDIATRICS

## 2019-12-03 PROCEDURE — 85025 COMPLETE CBC W/AUTO DIFF WBC: CPT

## 2019-12-03 PROCEDURE — 93530 HC RHC FOR CONG. CAR ABN: CPT | Mod: 59 | Performed by: PEDIATRICS

## 2019-12-03 PROCEDURE — 93530 PR RT HEART CATH, CONGENITAL HEART ABNL: CPT | Mod: 26,59,, | Performed by: PEDIATRICS

## 2019-12-03 PROCEDURE — D9220A PRA ANESTHESIA: ICD-10-PCS | Mod: ANES,,, | Performed by: ANESTHESIOLOGY

## 2019-12-03 PROCEDURE — C1726 CATH, BAL DIL, NON-VASCULAR: HCPCS | Performed by: PEDIATRICS

## 2019-12-03 PROCEDURE — 80048 BASIC METABOLIC PNL TOTAL CA: CPT

## 2019-12-03 PROCEDURE — 20600001 HC STEP DOWN PRIVATE ROOM

## 2019-12-03 PROCEDURE — 37000009 HC ANESTHESIA EA ADD 15 MINS: Performed by: PEDIATRICS

## 2019-12-03 PROCEDURE — 33477 PR IMPLANT TRASNCATH PULM VALVE, PERQ: ICD-10-PCS | Mod: 22,,, | Performed by: PEDIATRICS

## 2019-12-03 PROCEDURE — D9220A PRA ANESTHESIA: Mod: ANES,,, | Performed by: ANESTHESIOLOGY

## 2019-12-03 PROCEDURE — 85347 COAGULATION TIME ACTIVATED: CPT | Performed by: PEDIATRICS

## 2019-12-03 PROCEDURE — 33477 IMPLANT TCAT PULM VLV PERQ: CPT | Performed by: PEDIATRICS

## 2019-12-03 DEVICE — EDWARDS SAPIEN 3 TRANSCATHETER HEART VALVE (26MM)
Type: IMPLANTABLE DEVICE | Site: HEART | Status: FUNCTIONAL
Brand: EDWARDS SAPIEN 3 TRANSCATHETER HEART VALVE (THV)

## 2019-12-03 RX ORDER — OXYCODONE HYDROCHLORIDE 10 MG/1
10 TABLET ORAL EVERY 4 HOURS PRN
Status: DISCONTINUED | OUTPATIENT
Start: 2019-12-03 | End: 2019-12-04 | Stop reason: HOSPADM

## 2019-12-03 RX ORDER — ENALAPRIL MALEATE 2.5 MG/1
7.5 TABLET ORAL DAILY
Status: DISCONTINUED | OUTPATIENT
Start: 2019-12-04 | End: 2019-12-04 | Stop reason: HOSPADM

## 2019-12-03 RX ORDER — ACETAMINOPHEN 325 MG/1
650 TABLET ORAL EVERY 4 HOURS PRN
Status: DISCONTINUED | OUTPATIENT
Start: 2019-12-03 | End: 2019-12-04 | Stop reason: HOSPADM

## 2019-12-03 RX ORDER — SODIUM CHLORIDE 0.9 % (FLUSH) 0.9 %
10 SYRINGE (ML) INJECTION
Status: DISCONTINUED | OUTPATIENT
Start: 2019-12-03 | End: 2019-12-03 | Stop reason: HOSPADM

## 2019-12-03 RX ORDER — ONDANSETRON 2 MG/ML
4 INJECTION INTRAMUSCULAR; INTRAVENOUS DAILY PRN
Status: DISCONTINUED | OUTPATIENT
Start: 2019-12-03 | End: 2019-12-03 | Stop reason: HOSPADM

## 2019-12-03 RX ORDER — FUROSEMIDE 40 MG/1
40 TABLET ORAL DAILY
Status: DISCONTINUED | OUTPATIENT
Start: 2019-12-04 | End: 2019-12-04 | Stop reason: HOSPADM

## 2019-12-03 RX ORDER — NEOSTIGMINE METHYLSULFATE 0.5 MG/ML
INJECTION, SOLUTION INTRAVENOUS
Status: DISCONTINUED | OUTPATIENT
Start: 2019-12-03 | End: 2019-12-03

## 2019-12-03 RX ORDER — BUSPIRONE HYDROCHLORIDE 10 MG/1
10 TABLET ORAL 2 TIMES DAILY
COMMUNITY

## 2019-12-03 RX ORDER — ROCURONIUM BROMIDE 10 MG/ML
INJECTION, SOLUTION INTRAVENOUS
Status: DISCONTINUED | OUTPATIENT
Start: 2019-12-03 | End: 2019-12-03

## 2019-12-03 RX ORDER — MIDAZOLAM HYDROCHLORIDE 1 MG/ML
INJECTION, SOLUTION INTRAMUSCULAR; INTRAVENOUS
Status: DISCONTINUED | OUTPATIENT
Start: 2019-12-03 | End: 2019-12-03

## 2019-12-03 RX ORDER — CEFAZOLIN SODIUM 1 G/3ML
INJECTION, POWDER, FOR SOLUTION INTRAMUSCULAR; INTRAVENOUS
Status: DISCONTINUED | OUTPATIENT
Start: 2019-12-03 | End: 2019-12-03

## 2019-12-03 RX ORDER — SODIUM CHLORIDE 9 MG/ML
INJECTION, SOLUTION INTRAVENOUS CONTINUOUS PRN
Status: DISCONTINUED | OUTPATIENT
Start: 2019-12-03 | End: 2019-12-03

## 2019-12-03 RX ORDER — MORPHINE SULFATE 2 MG/ML
2 INJECTION, SOLUTION INTRAMUSCULAR; INTRAVENOUS
Status: DISCONTINUED | OUTPATIENT
Start: 2019-12-03 | End: 2019-12-04 | Stop reason: HOSPADM

## 2019-12-03 RX ORDER — FENTANYL CITRATE 50 UG/ML
INJECTION, SOLUTION INTRAMUSCULAR; INTRAVENOUS
Status: DISCONTINUED | OUTPATIENT
Start: 2019-12-03 | End: 2019-12-03

## 2019-12-03 RX ORDER — ONDANSETRON 2 MG/ML
INJECTION INTRAMUSCULAR; INTRAVENOUS
Status: DISCONTINUED | OUTPATIENT
Start: 2019-12-03 | End: 2019-12-03

## 2019-12-03 RX ORDER — GLYCOPYRROLATE 0.2 MG/ML
INJECTION INTRAMUSCULAR; INTRAVENOUS
Status: DISCONTINUED | OUTPATIENT
Start: 2019-12-03 | End: 2019-12-03

## 2019-12-03 RX ORDER — PROTAMINE SULFATE 10 MG/ML
INJECTION, SOLUTION INTRAVENOUS
Status: DISCONTINUED | OUTPATIENT
Start: 2019-12-03 | End: 2019-12-03

## 2019-12-03 RX ORDER — HEPARIN SODIUM 1000 [USP'U]/ML
INJECTION, SOLUTION INTRAVENOUS; SUBCUTANEOUS
Status: DISCONTINUED | OUTPATIENT
Start: 2019-12-03 | End: 2019-12-03

## 2019-12-03 RX ORDER — HYDROMORPHONE HYDROCHLORIDE 1 MG/ML
0.2 INJECTION, SOLUTION INTRAMUSCULAR; INTRAVENOUS; SUBCUTANEOUS EVERY 5 MIN PRN
Status: DISCONTINUED | OUTPATIENT
Start: 2019-12-03 | End: 2019-12-03 | Stop reason: HOSPADM

## 2019-12-03 RX ORDER — METOPROLOL TARTRATE 50 MG/1
50 TABLET ORAL 2 TIMES DAILY
Status: DISCONTINUED | OUTPATIENT
Start: 2019-12-03 | End: 2019-12-04 | Stop reason: HOSPADM

## 2019-12-03 RX ORDER — PROPOFOL 10 MG/ML
VIAL (ML) INTRAVENOUS
Status: DISCONTINUED | OUTPATIENT
Start: 2019-12-03 | End: 2019-12-03

## 2019-12-03 RX ORDER — FENTANYL CITRATE 50 UG/ML
25 INJECTION, SOLUTION INTRAMUSCULAR; INTRAVENOUS EVERY 5 MIN PRN
Status: DISCONTINUED | OUTPATIENT
Start: 2019-12-03 | End: 2019-12-03 | Stop reason: HOSPADM

## 2019-12-03 RX ADMIN — FENTANYL CITRATE 50 MCG: 50 INJECTION, SOLUTION INTRAMUSCULAR; INTRAVENOUS at 06:12

## 2019-12-03 RX ADMIN — METOPROLOL TARTRATE 50 MG: 50 TABLET ORAL at 09:12

## 2019-12-03 RX ADMIN — GLYCOPYRROLATE 0.4 MG: 0.2 INJECTION INTRAMUSCULAR; INTRAVENOUS at 06:12

## 2019-12-03 RX ADMIN — PROTAMINE SULFATE 10 MG: 10 INJECTION, SOLUTION INTRAVENOUS at 06:12

## 2019-12-03 RX ADMIN — ROCURONIUM BROMIDE 50 MG: 10 INJECTION, SOLUTION INTRAVENOUS at 05:12

## 2019-12-03 RX ADMIN — NEOSTIGMINE METHYLSULFATE 5 MG: 0.5 INJECTION INTRAVENOUS at 06:12

## 2019-12-03 RX ADMIN — SODIUM CHLORIDE: 0.9 INJECTION, SOLUTION INTRAVENOUS at 04:12

## 2019-12-03 RX ADMIN — HEPARIN SODIUM 5000 UNITS: 1000 INJECTION INTRAVENOUS; SUBCUTANEOUS at 05:12

## 2019-12-03 RX ADMIN — MIDAZOLAM HYDROCHLORIDE 4 MG: 1 INJECTION, SOLUTION INTRAMUSCULAR; INTRAVENOUS at 04:12

## 2019-12-03 RX ADMIN — PROPOFOL 150 MG: 10 INJECTION, EMULSION INTRAVENOUS at 05:12

## 2019-12-03 RX ADMIN — FENTANYL CITRATE 50 MCG: 50 INJECTION, SOLUTION INTRAMUSCULAR; INTRAVENOUS at 05:12

## 2019-12-03 RX ADMIN — ONDANSETRON 4 MG: 2 INJECTION INTRAMUSCULAR; INTRAVENOUS at 05:12

## 2019-12-03 RX ADMIN — HEPARIN SODIUM 4000 UNITS: 1000 INJECTION INTRAVENOUS; SUBCUTANEOUS at 06:12

## 2019-12-03 RX ADMIN — SUGAMMADEX 200 MG: 100 INJECTION, SOLUTION INTRAVENOUS at 06:12

## 2019-12-03 RX ADMIN — CEFAZOLIN 2 G: 330 INJECTION, POWDER, FOR SOLUTION INTRAMUSCULAR; INTRAVENOUS at 05:12

## 2019-12-03 NOTE — H&P
[facility]  Pediatric Cardiology  History and Physical     Patient Name: Sean Baez  MRN: [id number]  Admission Date: 12/3/2019  Code Status: Prior   Attending Provider: Marlyn Early MD  Primary Cardiologist: Dr. Carpenter  Primary Care Physician: Dk Persaud MD  Principal Problem:<principal problem not specified>    Subjective:     Chief Complaint:  Bioprosthetic pulmonary valve dysfunction.     HPI:  34 yr old female with repaired tetralogy of fallot and bioprosthetic pulmonary valve dysfunction.    Past Medical History:   Diagnosis Date    Sinus node dysfunction     TOF (tetralogy of Fallot)     V-tach        Past Surgical History:   Procedure Laterality Date    ICD      pulmonary valve replacement         Review of patient's allergies indicates:  No Known Allergies    No current facility-administered medications on file prior to encounter.      Current Outpatient Medications on File Prior to Encounter   Medication Sig    busPIRone (BUSPAR) 10 MG tablet Take 10 mg by mouth 2 (two) times daily.    coQ10, ubiquinol, 100 mg Cap Take 200 mg by mouth 2 (two) times daily.    enalapril (VASOTEC) 5 MG tablet Take 1.5 tablets (7.5 mg total) by mouth once daily. (Patient taking differently: Take 5 mg by mouth once daily. )    ergocalciferol (ERGOCALCIFEROL) 50,000 unit Cap Take 1 capsule (50,000 Units total) by mouth every 7 days.    levocetirizine (XYZAL) 5 MG tablet Take 5 mg by mouth once daily.     levothyroxine (SYNTHROID) 150 MCG tablet Take 1 tablet (150 mcg total) by mouth before breakfast. (Patient taking differently: Take 125 mcg by mouth before breakfast. )    metoprolol succinate (TOPROL-XL) 25 MG 24 hr tablet Take 1 tablet (25 mg total) by mouth once daily. (Patient taking differently: Take 50 mg by mouth 2 (two) times daily. )    metoprolol tartrate (LOPRESSOR) 50 MG tablet TAKE 1 TABLET(50 MG) BY MOUTH TWICE DAILY    montelukast  (SINGULAIR) 10 mg tablet Take 10 mg by mouth every evening.     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      Review of Systems   All other systems reviewed and are negative.    Objective:     Vital Signs (Most Recent):  Temp: 98.1 °F (36.7 °C) (12/03/19 1200)  Pulse: 79 (12/03/19 1200)  Resp: 16 (12/03/19 1200)  BP: 119/69 (12/03/19 1256)  SpO2: 99 % (12/03/19 1200) Vital Signs (24h Range):  Temp:  [98.1 °F (36.7 °C)] 98.1 °F (36.7 °C)  Pulse:  [79] 79  Resp:  [16] 16  SpO2:  [99 %] 99 %  BP: (114-119)/(67-69) 119/69     Weight: 106.6 kg (235 lb)  Body mass index is 34.7 kg/m².    SpO2: 99 %       No intake or output data in the 24 hours ending 12/03/19 1629    Lines/Drains/Airways     Peripheral Intravenous Line                 Peripheral IV - Single Lumen 12/03/19 1324 20 G Left Forearm less than 1 day                Physical Exam   Constitutional: She appears well-developed and well-nourished.   HENT:   Head: Normocephalic.   Eyes: Conjunctivae and EOM are normal.   Neck: Normal range of motion. Neck supple.   Cardiovascular: Normal rate.   Murmur heard.  Pulmonary/Chest: Effort normal.   Abdominal: Soft.   Musculoskeletal: Normal range of motion.   Neurological: She is alert.   Skin: Skin is warm. Capillary refill takes less than 2 seconds.       Significant Labs:   BMP:   Glucose (mg/dL)   Date/Time Value Status   12/03/2019 01:03 PM 81 Final     Sodium (mmol/L)   Date/Time Value Status   12/03/2019 01:03  Final     Potassium (mmol/L)   Date/Time Value Status   12/03/2019 01:03 PM 4.4 Final     Chloride (mmol/L)   Date/Time Value Status   12/03/2019 01:03  Final     CO2 (mmol/L)   Date/Time Value Status    12/03/2019 01:03 PM 24 Final     BUN, Bld (mg/dL)   Date/Time Value Status   12/03/2019 01:03 PM 13 Final     Creatinine (mg/dL)   Date/Time Value Status   12/03/2019 01:03 PM 0.9 Final     Calcium (mg/dL)   Date/Time Value Status   12/03/2019 01:03 PM 9.2 Final    and CBC   WBC (K/uL)   Date/Time Value Status   12/03/2019 01:03 PM 8.33 Final     Hemoglobin (g/dL)   Date/Time Value Status   12/03/2019 01:03 PM 13.2 Final     POC Hematocrit (%PCV)   Date/Time Value Status   04/26/2018 11:32 AM 31 (L) Final     Hematocrit (%)   Date/Time Value Status   12/03/2019 01:03 PM 41.8 Final     Mean Corpuscular Volume (fL)   Date/Time Value Status   12/03/2019 01:03 PM 90 Final     Platelets (K/uL)   Date/Time Value Status   12/03/2019 01:03  Final       Significant Imaging: None    Assessment and Plan:     Cardiac/Vascular  TOF (tetralogy of Fallot)   34 yr old female with repaired tetralogy of fallot and bioprosthetic pulmonary valve dysfunction.  Plan:  To cath lab for right/left heart cath and biosprosthetic pulmonary valve dysfunction.          Marlyn Early MD  Pediatric Cardiology   [facility]

## 2019-12-03 NOTE — ASSESSMENT & PLAN NOTE
34 yr old female with repaired tetralogy of fallot and bioprosthetic pulmonary valve dysfunction.  Plan:  To cath lab for right/left heart cath and biosprosthetic pulmonary valve dysfunction.

## 2019-12-03 NOTE — SUBJECTIVE & OBJECTIVE
Past Medical History:   Diagnosis Date    Sinus node dysfunction     TOF (tetralogy of Fallot)     V-tach        Past Surgical History:   Procedure Laterality Date    ICD      pulmonary valve replacement         Review of patient's allergies indicates:  No Known Allergies    No current facility-administered medications on file prior to encounter.      Current Outpatient Medications on File Prior to Encounter   Medication Sig    busPIRone (BUSPAR) 10 MG tablet Take 10 mg by mouth 2 (two) times daily.    coQ10, ubiquinol, 100 mg Cap Take 200 mg by mouth 2 (two) times daily.    enalapril (VASOTEC) 5 MG tablet Take 1.5 tablets (7.5 mg total) by mouth once daily. (Patient taking differently: Take 5 mg by mouth once daily. )    ergocalciferol (ERGOCALCIFEROL) 50,000 unit Cap Take 1 capsule (50,000 Units total) by mouth every 7 days.    levocetirizine (XYZAL) 5 MG tablet Take 5 mg by mouth once daily.     levothyroxine (SYNTHROID) 150 MCG tablet Take 1 tablet (150 mcg total) by mouth before breakfast. (Patient taking differently: Take 125 mcg by mouth before breakfast. )    metoprolol succinate (TOPROL-XL) 25 MG 24 hr tablet Take 1 tablet (25 mg total) by mouth once daily. (Patient taking differently: Take 50 mg by mouth 2 (two) times daily. )    metoprolol tartrate (LOPRESSOR) 50 MG tablet TAKE 1 TABLET(50 MG) BY MOUTH TWICE DAILY    montelukast (SINGULAIR) 10 mg tablet Take 10 mg by mouth every evening.     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      Review of Systems   All other systems reviewed and are negative.    Objective:     Vital Signs (Most Recent):  Temp: 98.1 °F (36.7 °C) (12/03/19 1200)  Pulse: 79  (12/03/19 1200)  Resp: 16 (12/03/19 1200)  BP: 119/69 (12/03/19 1256)  SpO2: 99 % (12/03/19 1200) Vital Signs (24h Range):  Temp:  [98.1 °F (36.7 °C)] 98.1 °F (36.7 °C)  Pulse:  [79] 79  Resp:  [16] 16  SpO2:  [99 %] 99 %  BP: (114-119)/(67-69) 119/69     Weight: 106.6 kg (235 lb)  Body mass index is 34.7 kg/m².    SpO2: 99 %       No intake or output data in the 24 hours ending 12/03/19 1629    Lines/Drains/Airways     Peripheral Intravenous Line                 Peripheral IV - Single Lumen 12/03/19 1324 20 G Left Forearm less than 1 day                Physical Exam   Constitutional: She appears well-developed and well-nourished.   HENT:   Head: Normocephalic.   Eyes: Conjunctivae and EOM are normal.   Neck: Normal range of motion. Neck supple.   Cardiovascular: Normal rate.   Murmur heard.  Pulmonary/Chest: Effort normal.   Abdominal: Soft.   Musculoskeletal: Normal range of motion.   Neurological: She is alert.   Skin: Skin is warm. Capillary refill takes less than 2 seconds.       Significant Labs:   BMP:   Glucose (mg/dL)   Date/Time Value Status   12/03/2019 01:03 PM 81 Final     Sodium (mmol/L)   Date/Time Value Status   12/03/2019 01:03  Final     Potassium (mmol/L)   Date/Time Value Status   12/03/2019 01:03 PM 4.4 Final     Chloride (mmol/L)   Date/Time Value Status   12/03/2019 01:03  Final     CO2 (mmol/L)   Date/Time Value Status   12/03/2019 01:03 PM 24 Final     BUN, Bld (mg/dL)   Date/Time Value Status   12/03/2019 01:03 PM 13 Final     Creatinine (mg/dL)   Date/Time Value Status   12/03/2019 01:03 PM 0.9 Final     Calcium (mg/dL)   Date/Time Value Status   12/03/2019 01:03 PM 9.2 Final    and CBC   WBC (K/uL)   Date/Time Value Status   12/03/2019 01:03 PM 8.33 Final     Hemoglobin (g/dL)   Date/Time Value Status   12/03/2019 01:03 PM 13.2 Final     POC Hematocrit (%PCV)   Date/Time Value Status   04/26/2018 11:32 AM 31 (L) Final     Hematocrit (%)   Date/Time Value Status   12/03/2019  01:03 PM 41.8 Final     Mean Corpuscular Volume (fL)   Date/Time Value Status   12/03/2019 01:03 PM 90 Final     Platelets (K/uL)   Date/Time Value Status   12/03/2019 01:03  Final       Significant Imaging: None

## 2019-12-04 VITALS
HEART RATE: 64 BPM | RESPIRATION RATE: 18 BRPM | SYSTOLIC BLOOD PRESSURE: 94 MMHG | WEIGHT: 235 LBS | TEMPERATURE: 99 F | DIASTOLIC BLOOD PRESSURE: 59 MMHG | OXYGEN SATURATION: 99 % | BODY MASS INDEX: 34.8 KG/M2 | HEIGHT: 69 IN

## 2019-12-04 LAB
POC ACTIVATED CLOTTING TIME K: 191 SEC (ref 74–137)
SAMPLE: ABNORMAL

## 2019-12-04 PROCEDURE — 93303 ECHO TRANSTHORACIC: CPT | Performed by: PEDIATRICS

## 2019-12-04 PROCEDURE — 99238 HOSP IP/OBS DSCHRG MGMT 30/<: CPT | Mod: ,,, | Performed by: PEDIATRICS

## 2019-12-04 PROCEDURE — 93325 DOPPLER ECHO COLOR FLOW MAPG: CPT | Performed by: PEDIATRICS

## 2019-12-04 PROCEDURE — 25000003 PHARM REV CODE 250: Performed by: PEDIATRICS

## 2019-12-04 PROCEDURE — 93320 DOPPLER ECHO COMPLETE: CPT | Performed by: PEDIATRICS

## 2019-12-04 PROCEDURE — 99238 PR HOSPITAL DISCHARGE DAY,<30 MIN: ICD-10-PCS | Mod: ,,, | Performed by: PEDIATRICS

## 2019-12-04 RX ADMIN — FUROSEMIDE 40 MG: 40 TABLET ORAL at 09:12

## 2019-12-04 NOTE — TRANSFER OF CARE
"Anesthesia Transfer of Care Note    Patient: Sean Baez    Procedure(s) Performed: Procedure(s) (LRB):  CATHETERIZATION, HEART, COMBINED RIGHT AND RETROGRADE LEFT, FOR CONGENITAL HEART DEFECT (N/A)  REPLACEMENT, PULMONARY VALVE (N/A)    Patient location: PACU    Anesthesia Type: general    Transport from OR: Transported from OR on 6-10 L/min O2 by face mask with adequate spontaneous ventilation    Post pain: adequate analgesia    Post assessment: no apparent anesthetic complications and tolerated procedure well    Post vital signs: stable    Level of consciousness: awake and alert    Nausea/Vomiting: no nausea/vomiting    Complications: none    Transfer of care protocol was followed      Last vitals:   Visit Vitals  /77 (BP Location: Right arm, Patient Position: Lying)   Pulse 79   Temp 36.7 °C (98.1 °F)   Resp 14   Ht 5' 9" (1.753 m)   Wt 106.6 kg (235 lb)   SpO2 100%   Breastfeeding? No   BMI 34.70 kg/m²     "

## 2019-12-04 NOTE — PROCEDURE NOTE ADDENDUM
Certification of Assistant at Surgery       Surgery Date: 12/3/2019     Participating Surgeons:  Surgeon(s) and Role:     * Marlyn Early MD - Primary     * Isaac Hensley Jr., MD - Assisting    Procedures:  Procedure(s) (LRB):  CATHETERIZATION, HEART, COMBINED RIGHT AND RETROGRADE LEFT, FOR CONGENITAL HEART DEFECT (N/A)  REPLACEMENT, PULMONARY VALVE (N/A)    Assistant Surgeon's Certification of Necessity:  I understand that section 1842 (b) (6) (d) of the Social Security Act generally prohibits Medicare Part B reasonable charge payment for the services of assistants at surgery in teaching hospitals when qualified residents are available to furnish such services. I certify that the services for which payment is claimed were medically necessary, and that no qualified resident was available to perform the services. I further understand that these services are subject to post-payment review by the Medicare carrier.      Isaac Hensley MD    12/03/2019  6:58 PM

## 2019-12-04 NOTE — PLAN OF CARE
Pt free of falls/trauma/injuries.  Denies c/o SOB, CP, or discomfort.  Generalized skin remains CDI; No edema noted.  Pt d/c am. Pt tolerating plan of care.

## 2019-12-04 NOTE — NURSING
PIV x1 removed. Telemetry removed. AVS provided and pt verbalized understanding. Pt ambulated with mother to ride.

## 2019-12-04 NOTE — NURSING TRANSFER
Nursing Transfer Note      12/3/2019     Transfer To: 342    Transfer via stretcher    Transfer with remote tele    Transported by RN    Medicines sent: n/a    Chart send with patient: Yes    Notified: mother    Patient reassessed at: 12/3/19  @ 0908

## 2019-12-05 LAB
GLUCOSE SERPL-MCNC: 85 MG/DL (ref 70–110)
HCO3 UR-SCNC: 22.4 MMOL/L (ref 24–28)
HCT VFR BLD CALC: 32 %PCV (ref 36–54)
PCO2 BLDA: 31.1 MMHG (ref 35–45)
PH SMN: 7.47 [PH] (ref 7.35–7.45)
PO2 BLDA: 132 MMHG (ref 80–100)
POC BE: -1 MMOL/L
POC IONIZED CALCIUM: 1.04 MMOL/L (ref 1.06–1.42)
POC SATURATED O2: 99 % (ref 95–100)
POC TCO2: 23 MMOL/L (ref 23–27)
POTASSIUM BLD-SCNC: 3.2 MMOL/L (ref 3.5–5.1)
SAMPLE: ABNORMAL
SODIUM BLD-SCNC: 143 MMOL/L (ref 136–145)

## 2019-12-09 NOTE — DISCHARGE SUMMARY
[facility]  Pediatric Cardiology  Discharge Summary      Patient Name: Sean Baez  MRN: [id number]  Admission Date: 12/3/2019  Hospital Length of Stay: 1 days  Discharge Date and Time: 12/4/2019  4:15 PM  Attending Physician: No att. providers found  Discharging Provider: ADRIANA Mares  Primary Care Physician: Dk Persaud MD    Procedure(s) (LRB):  CATHETERIZATION, HEART, COMBINED RIGHT AND RETROGRADE LEFT, FOR CONGENITAL HEART DEFECT (N/A)  REPLACEMENT, PULMONARY VALVE (N/A)     Indwelling Lines/Drains at time of discharge:  Lines/Drains/Airways     None                 Hospital Course: Patient taken for cardiac catheterization for history of Tetralogy of Fallot s/p repair as an infant for percutaneous pulmonary valve replacement. She tolerated the procedure well and recovered without incident. She was discharged home the following day.     Consults:     Significant Diagnostic Studies: Labs:   CMP   Sodium (mmol/L)   Date/Time Value Status   12/03/2019 01:03  Final     Potassium (mmol/L)   Date/Time Value Status   12/03/2019 01:03 PM 4.4 Final     Chloride (mmol/L)   Date/Time Value Status   12/03/2019 01:03  Final     CO2 (mmol/L)   Date/Time Value Status   12/03/2019 01:03 PM 24 Final     Glucose (mg/dL)   Date/Time Value Status   12/03/2019 01:03 PM 81 Final     BUN, Bld (mg/dL)   Date/Time Value Status   12/03/2019 01:03 PM 13 Final     Creatinine (mg/dL)   Date/Time Value Status   12/03/2019 01:03 PM 0.9 Final     Calcium (mg/dL)   Date/Time Value Status   12/03/2019 01:03 PM 9.2 Final     Total Protein (g/dL)   Date/Time Value Status   07/29/2019 11:57 AM 7.8 Final     Albumin (g/dL)   Date/Time Value Status   07/29/2019 11:57 AM 3.9 Final     Total Bilirubin (mg/dL)   Date/Time Value Status   07/29/2019 11:57 AM 1.8 (H) Final     Alkaline Phosphatase (U/L)   Date/Time Value Status   07/29/2019 11:57  Final     AST (U/L)   Date/Time Value Status    07/29/2019 11:57 AM 18 Final     ALT (U/L)   Date/Time Value Status   07/29/2019 11:57 AM 27 Final     Anion Gap (mmol/L)   Date/Time Value Status   12/03/2019 01:03 PM 10 Final     eGFR if African American (mL/min/1.73 m^2)   Date/Time Value Status   12/03/2019 01:03 PM >60.0 Final     eGFR if non African American (mL/min/1.73 m^2)   Date/Time Value Status   12/03/2019 01:03 PM >60.0 Final    and CBC   WBC (K/uL)   Date/Time Value Status   12/03/2019 01:03 PM 8.33 Final     Hemoglobin (g/dL)   Date/Time Value Status   12/03/2019 01:03 PM 13.2 Final     POC Hematocrit (%PCV)   Date/Time Value Status   12/03/2019 05:54 PM 32 (L) Final     Mean Corpuscular Volume (fL)   Date/Time Value Status   12/03/2019 01:03 PM 90 Final     Platelets (K/uL)   Date/Time Value Status   12/03/2019 01:03  Final       Pending Diagnostic Studies:     None        Final Active Diagnoses:    Diagnosis Date Noted POA    PRINCIPAL PROBLEM:  TOF (tetralogy of Fallot) [Q21.3] 04/26/2018 Not Applicable      Problems Resolved During this Admission:       Discharged Condition: stable    Disposition: Home or Self Care    Follow Up:  Follow-up Information     Chantel Saleh MD. Schedule an appointment as soon as possible for a visit in 2 weeks.    Specialty:  Pediatric Cardiology  Contact information:  [address] [place] [address]  [phone]                 Patient Instructions:      Notify your health care provider if you experience any of the following:  temperature >100.4     Notify your health care provider if you experience any of the following:  persistent nausea and vomiting or diarrhea     Notify your health care provider if you experience any of the following:  severe uncontrolled pain     Notify your health care provider if you experience any of the following:  redness, tenderness, or signs of infection (pain, swelling, redness, odor or green/yellow discharge around incision site)     Notify your  health care provider if you experience any of the following:  difficulty breathing or increased cough     Notify your health care provider if you experience any of the following:  severe persistent headache     Notify your health care provider if you experience any of the following:  worsening rash     Notify your health care provider if you experience any of the following:  persistent dizziness, light-headedness, or visual disturbances     Notify your health care provider if you experience any of the following:  increased confusion or weakness     No dressing needed     Activity as tolerated     Shower on day dressing removed (No bath)   Order Comments: No soaking cath site in tub for 2 days.     Medications:  Reconciled Home Medications:      Medication List      CHANGE how you take these medications    enalapril 5 MG tablet  Commonly known as:  VASOTEC  Take 1.5 tablets (7.5 mg total) by mouth once daily.  What changed:  how much to take     levothyroxine 150 MCG tablet  Commonly known as:  SYNTHROID  Take 1 tablet (150 mcg total) by mouth before breakfast.  What changed:  how much to take        CONTINUE taking these medications    atorvastatin 20 MG tablet  Commonly known as:  LIPITOR  TAKE 1 TABLET BY MOUTH EVERY DAY     busPIRone 10 MG tablet  Commonly known as:  BUSPAR  Take 10 mg by mouth 2 (two) times daily.     coQ10 (ubiquinol) 100 mg Cap  Take 200 mg by mouth 2 (two) times daily.     ergocalciferol 50,000 unit Cap  Commonly known as:  ERGOCALCIFEROL  Take 1 capsule (50,000 Units total) by mouth every 7 days.     furosemide 40 MG tablet  Commonly known as:  LASIX  TAKE 1 TABLET(40 MG) BY MOUTH EVERY DAY     levocetirizine 5 MG tablet  Commonly known as:  XYZAL  Take 5 mg by mouth once daily.     metoprolol tartrate 50 MG tablet  Commonly known as:  LOPRESSOR  TAKE 1 TABLET(50 MG) BY MOUTH TWICE DAILY     montelukast 10 mg tablet  Commonly known as:  SINGULAIR  Take 10 mg by mouth every evening.         ASK your doctor about these medications    metoprolol succinate 25 MG 24 hr tablet  Commonly known as:  TOPROL-XL  Take 1 tablet (25 mg total) by mouth once daily.            ADRIANA Mares  Pediatric Cardiology  [facility]

## 2019-12-12 ENCOUNTER — OFFICE VISIT (OUTPATIENT)
Dept: CARDIOLOGY | Facility: CLINIC | Age: 34
End: 2019-12-12
Payer: COMMERCIAL

## 2019-12-12 ENCOUNTER — CLINICAL SUPPORT (OUTPATIENT)
Dept: CARDIOLOGY | Facility: CLINIC | Age: 34
End: 2019-12-12
Payer: COMMERCIAL

## 2019-12-12 VITALS
BODY MASS INDEX: 36.58 KG/M2 | OXYGEN SATURATION: 98 % | SYSTOLIC BLOOD PRESSURE: 110 MMHG | DIASTOLIC BLOOD PRESSURE: 73 MMHG | WEIGHT: 247 LBS | HEART RATE: 87 BPM | HEIGHT: 69 IN

## 2019-12-12 DIAGNOSIS — Q21.3 TETRALOGY OF FALLOT: Primary | ICD-10-CM

## 2019-12-12 DIAGNOSIS — Z95.2 S/P PULMONARY VALVE REPLACEMENT: Primary | ICD-10-CM

## 2019-12-12 DIAGNOSIS — Z95.2 S/P PULMONARY VALVE REPLACEMENT: ICD-10-CM

## 2019-12-12 PROCEDURE — 93320 PR DOPPLER ECHO HEART,COMPLETE: ICD-10-PCS | Mod: S$GLB,,, | Performed by: PEDIATRICS

## 2019-12-12 PROCEDURE — 93325 PR DOPPLER COLOR FLOW VELOCITY MAP: ICD-10-PCS | Mod: S$GLB,,, | Performed by: PEDIATRICS

## 2019-12-12 PROCEDURE — 93000 PR ELECTROCARDIOGRAM, COMPLETE: ICD-10-PCS | Mod: 59,S$GLB,, | Performed by: PEDIATRICS

## 2019-12-12 PROCEDURE — 3008F BODY MASS INDEX DOCD: CPT | Mod: CPTII,S$GLB,, | Performed by: PEDIATRICS

## 2019-12-12 PROCEDURE — 93325 DOPPLER ECHO COLOR FLOW MAPG: CPT | Mod: S$GLB,,, | Performed by: PEDIATRICS

## 2019-12-12 PROCEDURE — 93320 DOPPLER ECHO COMPLETE: CPT | Mod: S$GLB,,, | Performed by: PEDIATRICS

## 2019-12-12 PROCEDURE — 93303 PR ECHO XTHORACIC,CONG A2M,COMPLETE: ICD-10-PCS | Mod: S$GLB,,, | Performed by: PEDIATRICS

## 2019-12-12 PROCEDURE — 93000 ELECTROCARDIOGRAM COMPLETE: CPT | Mod: 59,S$GLB,, | Performed by: PEDIATRICS

## 2019-12-12 PROCEDURE — 99215 PR OFFICE/OUTPT VISIT, EST, LEVL V, 40-54 MIN: ICD-10-PCS | Mod: 25,S$GLB,, | Performed by: PEDIATRICS

## 2019-12-12 PROCEDURE — 99215 OFFICE O/P EST HI 40 MIN: CPT | Mod: 25,S$GLB,, | Performed by: PEDIATRICS

## 2019-12-12 PROCEDURE — 93303 ECHO TRANSTHORACIC: CPT | Mod: S$GLB,,, | Performed by: PEDIATRICS

## 2019-12-12 PROCEDURE — 3008F PR BODY MASS INDEX (BMI) DOCUMENTED: ICD-10-PCS | Mod: CPTII,S$GLB,, | Performed by: PEDIATRICS

## 2019-12-12 NOTE — PROGRESS NOTES
Sean Baez was seen in the Adult with Congenital Heart Disease Clinic at [facility] on October 3rd, 2019.  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 She developed increasing pulmonary valve stenosis, however she  was diagnosed with left ventricular dysfunction, hypothyroidism, and constrictive pericarditis about 2 years ago, and the valve replacement was delayed.  Sean underwent percutaneous pulmonary valve replacement with a  26 mm Kelley Joe 3 valve on December 3, 2019.  She recovered uneventfully from this procedure.  This is her 1st post procedure visit.   Her complex course is outlined below.       Sean underwent repair of tetralogy of Fallot in early childhood, and then a pulmonary valve replacement later in childhood.  In May 2005, she had a second pulmonary valve replacement with a 29 mm Mosiac porcine valve in the pulmonary position because of pulmonary insufficiency, and placement of an epicardial pacemaker for sinus node dysfunction, and placement of an automatic intracardiac defibrillator with an epicardial patch for ventricular tachycardia.  The AICD generator was changed in 2011.  We followed her for many years in this clinic with a diagnosis of right ventricular dysfunction, a well-functioning porcine pulmonary valve, ventricular tachycardia and sinus node dysfunction, pacemaker and AICD, tricuspid stenosis, mitral insufficiency, mild left ventricular dysfunction and obesity. Her rhythm was well controlled over the last several years, with only one inappropriate shock about 9 years ago requiring a pacemaker adjustment.        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  During that time she had reasonable exercise tolerance, pulmonary valve function and no arrhythmias.      At a clinic visit in April, 2017, she was feeling well.  Physical exam showed a grade 2 to 3/6 systolic ejection murmur best heard at the left mid to upper sternal border and over the precordium, and a newly heard diastolic rumble at the left lower sternal border. The liver edge was 2 cm below the right costal margin.  Pulses were 2+ in brachial and 1+ in femoral, although it was difficult to feel her pulse is secondary to obesity.  There was no peripheral edema. An EKG showed sinus rhythm at 68 bpm, normal atrial and ventricular forces, and T-wave inversions in lead 1 V5 and V6 suggestive of ischemia (unchanged). An echocardiogram showed an aortic root of 30 mm, the left atrium was dilated at 44 mm, (no old values for comparison), the right ventricle was 34 mm which was slightly increased from 32 mm, and the fractional area change of 30%, showed mildly reduced function which had improved.  The interventricular septum measured 11 on the left ventricular posterior wall 12 mm which are at the upper limits of normal, the left ventricular internal dimension in diastole was 52 and in systole 36 mm giving him measured ejection fraction of 57%.  The ejection fraction was slightly decreased but improved since the previous value.  The pulmonary valve annulus measured 19 mm. There was slight paradoxic motion of the interventricular septum, with otherwise normal-appearing right and left ventricular function. The tricuspid valve domed in diastole.  The pulmonary valve leaflets could be seen and the pulmonary annulus of 19 mm was probably underestimated.  The right atrium was dilated at 61 x 57 mm.  The left pulmonary artery measured 12 mm and the right pulmonary artery could not be seen.  The aortic arch was  left-sided and unobstructed.  The gradient across the tricuspid valve was 9 peak and 4 mean mmHg gradient indicating mild stenosis, unchanged.  There was trivial tricuspid regurgitation with a regurgitant gradient of 24 mmHg suggesting normal right ventricular pressure.  The gradient across the porcine pulmonary valve was 22 peak and 13 mean mmHg showing trivial obstruction, and there was trivial pulmonary insufficiency.  There was mild mitral insufficiency and no residual ventricular septal defect.  An exercise stress test showed karma Harris exercised 7 minutes and 30 seconds of the Sam protocol, and then stopped due to fatigue and leg cramps.  Baseline blood pressure was 108/77 mmHg and then increased to 159/68 mmHg, which may be an underestimate, before returning to baseline.  Baseline heart rate was 68 bpm and increased to 147 bpm before returning to baseline.  There were initially T-wave inversions in lead I V5 and V6, which then reverted to upright during exercise, before becoming negative again in recovery.  There were no other ST segment changes or arrhythmias.      At that visit, I felt  that Steven was stable with her complex congenital heart disease.  Her right and left ventricular function had improved.  Her porcine pulmonary valve was functioning well with only mild stenosis and insufficiency.  She had mild tricuspid stenosis with significant right atrial dilatation, also unchanged.  She had left atrial dilatation which I felt was secondary to her mitral insufficiency and perhaps some diastolic dysfunction, also unchanged.  Per her report, her pacemaker was functioning properly and her rhythm was controlled on her current dose of metoprolol.  I requested records from her electrophysiologist in [place]. No cardiac intervention was recommended  A CBC, CMP, BNP, and lipid profile were drawn which showed a normal CBC, and normal CMP, a BNP of 82 pg/mL, an elevated total cholesterol of 220 mg/dL, a low HDL  cholesterol of 36 mg/dL, normal triglycerides, and an elevated LDL cholesterol of 168 mg/dL.  A chest x-ray was obtained at that visit which showed mild cardiomegaly with normal vascularity, epicardial pacing leads on the right atrium and right ventricle and the AICD patch at the apex.  Sean reported that she had become engaged and desired pregnancy in the future. At that visit I felt she would be able to tolerate a pregnancy.       At a clinic in November, 2017, Sean was not feeling well.  Approximately 3 months prior, while walking on the beach, she had the sudden onset of fatigue, shortness of breath, palpitations and sweating, but no chest pain.  Although she sat down and went into air-conditioning, the fatigue, shortness of breath and sweating persisted for another 30 minutes. Following that, she has noticed an increase in fatigue and exercise intolerance.  She has not participated in any exercise for that reason.  She reported these were the same symptoms as before her last valve replacement. Sean also had a very busy life and lots of stresses.  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  She reported increased fatigue with these activities and decreased energy. She had not had a pacemaker check in 9 months.  She was recently started on atorvastatin for elevated cholesterol, which was her only medication change.  She was taking metoprolol succinate 50 mg by mouth daily, enalapril 5 mg in the morning and 2.5 mg at night, aspirin 81 mg by mouth daily, atorvastatin 20 mg by mouth daily, Zoloft 100 mg by mouth daily, and venlafaxine 150 mg by mouth daily.  Her exam showed she had gained weight to 117 kg, a 2 kg weight increase. Her blood pressure in the right arm was 119/76 mmHg.  Pulse was 91 bpm  and pulsed oximetry in room air was 98%.  First heart sound was normal and second heart sound was widely split. There was a grade 2/6 systolic ejection murmur best heard at the left upper sternal border and down the left sternal border.  A diastolic rumble was heard.  The liver was 2 cm below the right costal margin and unchanged.  The pacemaker was palpated in the upper mid abdomen.  Pulses were 2+ bilaterally except for 1+ in the right femoral.  There was no peripheral edema.     EKG showed sinus rhythm at a rate of 73 bpm with first-degree block (386 ms).  T-wave inversions in leads II and aVF, normal ventricular forces, and T wave inversions in leads V1 and V5, with T-wave flattening in V6.  Compared to the previous EKG, it was no change.     Echocardiogram showed the anatomy for tetralogy of Fallot status post complete repair and status post a pulmonary valve replacement.  It should be mentioned that the heart was difficult to visualize and images were of poor quality and attributed to obesity.  The aortic root measured 30 mm, unchanged, left atrium 35 mm, improved, and right ventricle 34 mm, unchanged.  The interventricular septum measured 11 and the left ventricular posterior wall 11 mm both of which were at the upper limits of normal.  The left ventricular internal dimension in diastole was 56 and in systole 43 mm giving a calculated ejection fraction of 46% which has decreased since the previous study.  There was flattening of septal wall motion.  The left ventricular posterior wall bowed posteriorly.  The pulmonary valve leaflets were difficult to see.  The tricuspid valve leaflets were difficult to see, but the tricuspid valve annulus was subjectively smaller than the mitral valve annulus.   Right ventricular function was judged subjectively to be mildly depressed, but the fractional area change was measured at 30%, which is within normal limits.   The branch pulmonary arteries could not be imaged. The  aortic arch was left-sided and unobstructed.  The gradient across the tricuspid valve was 10 peak and 5 mean mmHg indicating mild tricuspid stenosis, unchanged.  The gradient across the bioprosthetic pulmonary valve was 23 peak mmHg, unchanged.  Trivial pulmonary insufficiency.  No tricuspid insufficiency.  No aortic stenosis or aortic insufficiency.  Trivial mitral insufficiency, improved.  No aortic arch obstruction.       The pacemaker was then downloaded by the [employer] representative, and it showed that she is atrially paced 6% of the time with no ventricular pacing.  She had a prolonged AV interval of 370 ms. Her underlying rhythm was sinus bradycardia with a ventricular rate in the 40s.  She had no abnormal tachycardias greater than 150 bpm.  Her atrial and ventricular thresholds were good.  She had less than one year life on her battery.  We adjusted her pacemaker settings to have rates detected (monitor zone) greater than 130 bpm for 32 beats in duration.     At that visit,  my impression was Sean had a decrease in exercise tolerance and an increase in fatigue which was cardiac related.  She was difficult to evaluate by transthoracic echocardiography, probably secondary to obesity. I felt  her episode was secondary to an increase in tricuspid stenosis or bioprosthetic pulmonary valve dysfunction, and a decrease in left ventricular function.  Her symptoms may have been exacerbated by the stresses of finishing her residency, moving across the country, starting a new job, and planning a wedding.       Therefore, I began Sean on coenzyme Q 10 100 mg by mouth twice a day, for her left ventricular dysfunction, right ventricular dysfunction, and because she was started on a statin drug.   I presented her in cath/surgery conference for cardiac catheterization, where her tricuspid valve, pulmonary valve and coronary arteries could be evaluated, and possibly a Serina valve could be implanted and or a tricuspid  valve balloon.  At the same time, I wanted her to have a transesophageal echocardiogram since her transthoracic imaging was poor.   I kept her on the same doses of her other medications, enalapril, metoprolol, atorvastatin, and aspirin.    Laboratory work which was obtained at that visit which subsequently showed a normal CBC with the exception of a mildly elevated platelet count of 352,000, and a normal CMP.  Of note, her BNP was mildly elevated to  162 pg/mL, compared to the previous value.  A free T4 was normal at 0.9 ng/dL, but her free T3 was decreased at 1.8 pg/mL, although her TSH was normal at 1.4 to micro-units per milliliter.  A cholesterol profile was repeated now that she was on atorvastatin, and that showed the cholesterol had decreased to 179 mg/dL, and the LDL cholesterol had decreased to 123.4 mg/dL, which were now normal, although her HDL remained decreased at 37 mg/dL.  Sean then saw her internal medicine doctor who began her on thyroid replacement hormone.        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  A catheterization and possible Serina valve implantation has been scheduled for February 6, 2018.  Her wedding was March 10, 2018.  Her work schedule was rather demanding, with 7-9 days in-house as a hospitalist, followed by 7 days off.   Sean was feeling better than she was at her last clinic visit, and in particular had less fatigue.  She was still short of breath with climbing stairs, but has not had any further episodes of sudden onset of shortness of breath, weakness, sweating, palpitations or chest pain.  She had started a diet and light exercise program.     She was taking metoprolol XL 50 mg by mouth daily, enalapril 5 mg in the morning and 2.5 mg at night, aspirin 81 mg by mouth daily, coenzyme Q10 200 mg by  mouth twice a day, atorvastatin 20 mg by mouth daily, Synthroid 150 µg by mouth daily, Zoloft 100 mg by mouth daily, and had weaned her Effexor down to 75 mg by mouth daily.     Exam revealed an obese but very pleasant and otherwise healthy appearing young woman in no acute distress.  Height was 175.3 cm and weight was 116.8 kg, a 1 kg weight decrease since her last clinic visit. First heart sound was normal and second heart sound was widely split.  The was a grade 2/6 systolic ejection murmur heard best at the left upper sternal border, and faintly over the precordium.  Diastole was clear (the previous diastolic rumble had resolved).  The liver edge was 2 cm below the right costal margin and unchanged.  Pulses were 2+ bilaterally.  There was no peripheral edema.     EKG showed an atrially paced rhythm at a rate of 68 bpm with a prolonged CA interval of 112 ms, with right ventricular conduction delay and negative T waves in the left precordial leads, unchanged.     Echocardiogram showed the anatomy for status post repair of tetralogy of Fallot and placement of a bioprosthetic valve in the pulmonary position.  The aortic root was mildly dilated at 31 mm, unchanged, the left atrium had increased to 45 mm, and the right ventricle measured 29 mm, unchanged.  The interventricular septum measured 12 and the left ventricular posterior wall 12 mm which were at the upper limits of normal.  The left ventricular internal dimension in diastole was 53 and in systole 37 mm giving a calculated ejection fraction of 58%, which had improved significantly from her previous ejection fraction of 46% 2 months ago.  The right ventricle was not seen well enough quantitate function, but subjectively appeared improved since the previous visit.  They bioprosthetic valve was poorly seen in the pulmonary position, and the leaflets were thick and moving poorly.  The right atrium was significantly dilated at 54 x 53 mm.  The tricuspid valve was  not seen well enough to measure the annulus.  pulmonary arteries could not be imaged.  The aortic arch was left-sided  The gradient across the tricuspid valve was 11 peak and 5 mean millimeters of mercury indicating mild stenosis which was unchanged.  The gradient across the right ventricular outflow tract was 27 mmHg peak.  There was mild pulmonary insufficiency. There was trivial tricuspid insufficiency which was inadequate to estimate right ventricular pressures.       Sean was evaluated by Dr. Chintan Cabrera, who felt that Sean did not have any abnormal tachycardia arrhythmias.  Her defibrillator, was approaching end-of-life.  Please see Dr. Cabrera's evaluation for more details.  He recommended that Sean receive monthly pacemaker/AICD evaluations to determine the best time for replacement.     My impression at that visit was Sean's left ventricular function had improved significantly since being placed on coenzyme every 10 and thyroid replacement hormone.  I believed this had led to a decrease in symptoms and an improvement in energy level.  I continued to feel, however, that Sean still required a transesophageal echocardiogram and a cardiac catheterization and possible Serina valve replacement.  I did feel, however, that this could be delayed until after her wedding, to avoid the unlikely chance of a complication that might impact her wedding.   My opinion was that waiting an additional month to perform the catheterization would not adversely affect her health.  I discussed this with Sean and her fiancé, and they were in agreement.     Sean expressed the desire to undergo pregnancy at some time in the future.  I recommended she have her cardiac catheterization and possible Serina valve implantation, and also her AICD generator change, before becoming pregnant.  I continued the same doses of her medications.   SBE prophylaxis was recommended for dental and surgical procedures.       Sean  was seen again in clinic in February, 2018.  She was generally feeling well except for one episode of tachycardia, shortness of breath and nausea that occurred out while working out with her  in the morning, and she had not yet eaten breakfast.  She had no more recurrences of these symptoms even though she has had several training sessions since then.  She otherwise felt well, and participated in all activities including exercising and working as a hospitalist, and denied chest pain, palpitations and shortness of breath and swelling.  She had postponed her cardiac catheterization and possible Serina valve placement until after the wedding.  She continued taking metoprolol 50 mg by mouth daily, enalapril 5 mg in the morning and 2.5 mg in the evening, aspirin 81 mg by mouth daily, coenzyme Q 10 100 mg by mouth twice a day, Synthroid 150 µg by mouth daily, and Zoloft 100 mg by mouth daily.  She has been weaned off her Effexor.     Physical exam revealed an obese, pleasant and healthy-appearing woman in no acute distress.  Vital signs showed a height of 175.3 cm or 5 feet 9 inches and a weight of 119 kg or 262 lbs. 7 oz., which is a weight increase of 2.2 kg since her last clinic visit.  Blood pressure in the right arm was 107/73 mmHg, pulse 87 bpm, and pulsed oximetry in room air 98%. Examination of the skin showed it to be pink and well perfused.  The lungs were clear.  Palpation of the precordium showed it to be normal with a well-healed midline scar, and a pacemaker palpated in the abdomen.  The liver edge was 2 cm below the right costal margin, unchanged.  Pulses were 2+ bilaterally.  There was no peripheral edema.     EKG was obtained which showed a paced atrial rhythm at 78 bpm, with right ventricular conduction delay, and T-wave inversions in the limband precordial leads suggestive of ischemia, unchanged.     Echocardiogram showed evidence for repair of tetralogy of Fallot status post pulmonary valve  replacement.  Two-dimensional imaging was poor.  M-mode parameters were as follows: The aortic root measured 30 mm, left atrium 43 mm, right ventricle 26 mm, interventricular septum 12 and left ventricular posterior wall 12 mm, the left ventricular internal dimension in diastole was 56 and in systole 36 mm giving a calculated ejection fraction of 55% and these numbers are normal.  The right ventricle function was judged subjectively to be reduced but unable to be quantitated The bioprosthetic valve in the pulmonary position was difficult to see, however thickened leaflets with poor movement were observed.  The aortic arch was left-sided and unobstructed.  The superior vena cava drains normally to the right atrium.  Doppler analysis using pulsed continuous-wave and color-flow:  The there was turbulence across the tricuspid valve with a gradient of 14 peak and 10 mean millimeters of mercury indicating mild tricuspid valve stenosis which was unchanged.  Mitral insufficiency was present which was mild.  The gradient across the bioprosthetic pulmonary valve was 32 mmHg peak in the setting of reduced right ventricular function.  No residual ventriculoseptal defect.  No aortic stenosis or aortic insufficiency. Impression: Status post repair of tetralogy of Fallot. Poor two-dimensional imaging.  Decreased right ventricular function.  Bioprosthetic pulmonary valve with thickened and poorly moving leaflets, and a 32 mmHg peak gradient across it in the setting of reduced right ventricular function.  Mild tricuspid valve stenosis with a 14 peak and 10 mean millimeter gradient across it, unchanged.     Sean was then evaluated by electrophysiologist Dr. Chintan Cabrera, performed a download of her pacemaker, and that showed that there was a 6 beat run of ventricular tachycardia which resolved spontaneously, and that the pacemaker generator close to end-of-life.     My impression from that visit was that Sean had right  ventricular dysfunction and pulmonary valve dysfunction, in particular pulmonary valve stenosis.   I felt that she required a pulmonary valve replacement which hopefully could be performed with a Serina valve.  I believed her right ventricular function would improve after that.  She also has ventricular tachycardia, but it was self-limited and she was protected by both her metoprolol and her AICD.  The generator also required replacement.  Dr. Cabrera has recommended the metoprolol be increased to 50 mg by mouth twice a day. Following this clinic visit, Sean called with some leg swelling, and was placed on Lasix 20 mg by mouth daily, in addition to her other medications.     Sean had an uneventful wedding and honeymoon.  She underwent replacement of her ICD generator by Dr. Chintan Cabrera on April 4, 2018.  An ICD PAWAN ROSS DR [id number]: Serial No. [id number] was placed in the abdominal pocket and the previous generator removed.  She tolerated the procedure well.  Lead testing revealed:      RV Lead:       Threshold: 1.3 V / 0.4 ms       Impedance: 646 ohms       R wave: 5.9 mV  RA Lead:       Threshold: 1.3 V / 0.4 ms       Impedance: 589 ohms       P wave: .5 mV     Sean underwent transesophageal echocardiogram and cardiac catheterization under general anesthesia on April 26, 2018.  Transesophageal echocardiogram showed:     Mild right ventricular dilatation.  Paradoxic motion of the interventricular septum.    Normal left ventricle structure and size, with normal left ventricular posterior wall motion.  Normal to mildly decreased right ventricular free wall motion.  No pericardial effusion with normal-appearing pericardium.  No atrial or ventricular shunts.  Tethering of the tricuspid valve septal leaflet, with mild doming mild tricuspid valve prolapse.  Normal tricuspid valve velocity, and mild tricuspid valve insufficiency, with a 29 mmHg gradient.    Immobile and echodense posterior leaflet of the  "prosthetic pulmonary valve, with a peak gradient of 17 mmHg and mild pulmonary insufficiency.     Catheterization showed:     Saturations: SVC 69%, RA 72%, right pulmonary artery 69%, left pulmonary artery 69%, aortic 99%.    Pressures in mmHg:  RA mean 19, RV 50/19, RPA  40/14 mean  25, LPA 43/16 mean 26, RPCWP mean 20, LPCWP mean 20, LV 101/18, ascending aorta 101/59  mean  77, and descending aorta 99/58 mean 75.    Calculations:  Using a hemoglobin of 11.3,  the Qp equaled to Qs at 2.55 L/min/m2, which gave a pulmonary vascular resistance of 2.36 Wood units/m2.  The raw cardiac output was 5.73 liters per minute.     Coronary angiography was then performed which showed normal left and right coronary arteries.  No other angiography was performed.  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     Sean's postprocedure follow-up clinic visit was on May 1, 2018.  Since her discharge from the hospital several days prior, she had been feeling generally well.  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  She had in general noticed increasing shortness of breath with walking compared to 6 months ago. 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  Her swelling had improved after starting the Lasix, she only noticed ankle swelling when on her feet for several hours.  She also noticed an improvement in palpitations after increasing her metoprolol to 50 mg by mouth twice a day, however she continued to experience palpitations every night when lying down, which lasted for a few seconds.  She had occasional "tingling" in her left upper chest which lasted for several seconds and occurred once a week, but no chest pain.  She had not exercised in over one month.  She has continued to gain weight.     Saen was taking Lasix 20 mg by mouth " daily added about 2 months prior, coenzyme every 10 100 mg by mouth twice a day, enalapril 5 mg in the morning and 2.5 mg at night, metoprolol tartrate 50 mg by mouth twice a day, Synthroid, and atorvastatin.  She was no longer taking aspirin or Zoloft.     Physical exam revealed a pleasant, obese and healthy-appearing young woman in no acute distress.  Vital signs showed a height of 175.3 cm or 5 feet 9 inches, and a weight of 121.3 kg or 267 lbs. 6 oz., which is an increase of 2 kg from her last clinic visit.  Blood pressure was 105/64 mmHg and heart rate 65 bpm.  Pulsed oximetry was not obtained     Examination of the skin showed it to be pink and well perfused area the lungs were clear.  Palpation of the precordium showed it to be normal with a well-healed midline scar.  First heart sound was normal and second heart sound was widely split.  There was a grade 2/6 systolic ejection murmur fairly well localized to the left upper sternal border.  I no longer head the diastolic rumble.  The liver edge was 2 cm below the right costal margin.  Pulses were 2+ bilaterally.  The wounds were healed.  There was no peripheral edema.     EKG showed sinus rhythm at 64 bpm, with first-degree block and a [place] interval of 266 ms, right ventricular hypertrophy, and T-wave inversions in the inferior and precordial leads. This EKG had not changed since the previous one, however the T-wave in lead V6 has become inverted in the last year.     I reviewed the cardiac catheterization, pressure tracings and transesophageal echocardiogram with Sean and her .  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  I felt that this was the primary cause of her symptoms of exercise intolerance, fatigue and peripheral edema.  The cause of this was perplexing. It was possible that her AICD patch was contributing to diastolic dysfunction, and  I felt she should also be worked up for pericardial and myocardial infiltrative diseases.  I continued to feel, however, that she would still benefit from a new pulmonary valve, since one of the valve leaflets was frozen, and the valve had been in for 13 years.  I believed that the pulmonary valve stenosis, insufficiency and tricuspid valve stenosis was a minor contributor to her right-sided dysfunction and symptoms.  For now, I felt she should be medically managed.  Sean actually looked quite well at that visit, and therefore was continued on her current medical management.  I also felt that her persistent weight gain and obesity were contributing to exercise intolerance. Sean was also complaining of palpitations which needed to be characterized.     Therefore, Sean was kept on the same doses of her Lasix, coenzyme Q 10, enalapril, Synthroid, and atorvastatin.  We placed a 48 hour Holter monitor as surveillance for arrhythmias.  She was advised to slowly begin an exercise program and continue to lose weight with diet.  Laboratory work including an GWEN, rheumatoid factor, complement, sedimentation rate, high specificity CRP, CBC, reticulocyte count, BNP, and CMP.  These results subsequently showed: CBC was within normal limits; the CMP was normal with the exception of a bilirubin of 2.0 mg/dL and a bicarbonate of 22 mmol per liter; the BNP was elevated at 209 pg/mL; the high sensitivity CRP was elevated at 4.72 mg/L; the TSH was 0.037 micro international units per milliliter; the sedimentation rate was elevated at 46 mm/h; the C3 complement was elevated at 185 mg/dL.  The free T4, C4 complement, rheumatoid factor, GWEN and reticulocytes were all normal.  These results indicated an inflammatory process.  Sean was then referred to a rheumatologist to see if an inflammatory disease was contributing to her constrictive pericarditis.  She did not obtain that consultation. Sean's  Holter monitor subsequently  showed sinus rhythm at rates 59 - 210 bpm with a heart average heart rate of 64 bpm, frequent premature atrial beats, atrial bigeminy and occasional atrial couplets, with a total of 1766 premature atrial beats in 24 hours, and rare PVCs.  The increase in atrial ectopy was new.     Sean also was evaluated by Dr. Thad Ny at [facility] adult congenital heart disease program in [place].  Dr. Ny agreed that Sean had developed a constrictive pericarditis, and he felt it might be secondary to the AICD patch over the cardiac apex, and possibly intrinsic restrictive physiology from her tetralogy of Fallot.  He did not think a Serina valve placement was indicated at that time.  He felt that her tricuspid stenosis may have been secondary to her initial surgical repair.  He felt that her obesity was contributing to her dyspnea, and suggested a possible sleep study.  He felt that she probably would be able to tolerate a pregnancy in the future, but would be in a high risk category.  He recommended that her diuresis be increased and that her Lasix be increased to 40 mg by mouth daily.     Sean was then seen in May, 2018.  Since her last clinic visit 6 weeks prior, Sean was feeling better.  She had begun an exercise program with a , did primarily weight lifting but also walked on a treadmill for about 5 minutes.  She reported less shortness of breath with exercise.  Her energy level had improved since her last clinic visit.  Her previous complaints of palpitations had resolved. She was now active working 7 days at a time.  She was dieting and has lost about 12 pounds.     Sean was currently taking Lasix which had been increased to 40 mg by mouth daily and to which she has a good diuretic response, coenzyme every 10 100 mg by mouth twice a day, metoprolol tartrate 50 mg by mouth twice a day, enalapril 5 mg in the morning and 2.5 mg at night, Synthroid which had been decreased to 137 µg daily,  Singulair 10 mg by mouth daily, levo cetirizine 5 mg by mouth daily, fish oil and multivitamins.     Physical exam revealed a pleasant, healthy-appearing obese young woman in no acute distress.  Vital signs showed a height of 175.3 cm or 5 feet 9 inches, and a weight of 116.1 kg or 255 lbs. 15 oz., which was a 5 kg weight decrease since her last clinic visit.  Blood pressure in the right arm was 107/71 mmHg, pulse 84 bpm and pulsed oximetry in room air 95%.     Examination of the skin showed it to be pink and well perfused.  The lungs are clear.  Palpation of the precordium showed it to be normal with a well-healed midline scar.  First heart sound was normal and second heart sound widely split.  The was a grade 2/6 systolic ejection murmur best heard at the left upper sternal border but also over the precordium. Diastole was clear.  The liver edge was 2 cm below the right costal margin.  Pulses were 2+ bilaterally.  There was no peripheral edema.     EKG was obtained which showed an atrially paced rhythm at 70 bpm with an AV conduction time of 360 ms, right ventricular hypertrophy, and T-wave inversions in leads I, II, V5 and V6 and T-wave flattening in leads aVF indicating ischemia, with a QTc interval of 441.  Compared to the previous study there was a negative T-wave in lead I.     An echocardiogram was obtained which showed status post repair of tetralogy of Fallot.  Two-dimensional imaging was poor.  M-mode parameters were as follows: The aortic root measured 30 mm unchanged, left atrium was significantly dilated at 46 mm increased, right ventricle 20 mm by M-mode and 35 mm in the long axis view, the interventricular septum measured 10 and the left ventricular posterior wall 10 mm which were both normal.  The left ventricular internal dimension in diastole was 54 and in systole 41 mm giving a calculated ejection fraction of 47%.  The left ventricular ejection fraction has decreased since the previous  study (56%).  There was no pericardial effusion.  There was decreased excursion of the septal leaflet of the tricuspid valve leading to inadequate tricuspid valve opening in diastole.  The pulmonary valve was echo dense and the posterior leaflet had decreased motion although the valve was poorly seen in general.  The ventriculoseptal defect patch was in proper position.  The right atrium was dilated and measured 47 x 46 mm. There was subjectively normal right ventricular function with a fractional area change of 47%.  There was poor subcostal imaging.  The aortic arch was left-sided and unobstructed. The right superior vena cava drained normally to the right atrium.  Doppler analysis using pulsed, continuous wave and color-flow: Tricuspid stenosis was present with a 12 peak and 5 mean millimeter gradient across it indicating mild + tricuspid stenosis which was unchanged.  The gradient across the pulmonary valve was 25 mmHg peak indicating mild obstruction and there was mild to moderate pulmonary insufficiency.  No tricuspid insufficiency.  Trivial mitral insufficiency.  No aortic stenosis, aortic insufficiency, or residual ventriculoseptal defect.  Impression: Status post repair of tetralogy of Fallot with bioprosthetic valve in the pulmonary position.  Echodense pulmonary valve with decreased motion of the posterior leaflet, but only a 25 mmHg peak gradient across it indicating mild obstruction in the presence of normal right ventricular function, with mild to moderate pulmonary insufficiency.  Moderate left ventricular dysfunction which has increased.  Normal right ventricular function.  Dilated left atrium which has increased.  Dilated right atrium.  Tricuspid stenosis with decreased excursion of the septal leaflet.     An exercise stress test was performed which showed that [place] exercise 6 minutes and 49 seconds of the Sam protocol, indicating poor exercise tolerance for age.  Baseline heart rate was 63  bpm, increased to 137 bpm at peak exercise before decreasing back to 71 bpm 9 minutes into recovery.  This was a suboptimal peak heart rate which was probably secondary to beta-blockade.  Baseline blood pressure was 98/63 mmHg in the right arm, increased to 117/65 mmHg 1 minute into recovery, before decreasing back to 103/51 mmHg 7 minutes into recovery.  This was a blunted pressure response to exercise and may have been due to beta-blockade and ACE inhibition.  Baseline saturation was 97% and decreased to 87% at peak exercise, before increasing back to 98% 1 minute into recovery.  There were desaturations at peak exercise that probably represented intrapulmonary shunting.  Sinus rhythm with frequent PACs and occasional atrial couplets were present starting in stage II, which then resolved at peak exercise.  Occasional PACs were then seen again in recovery.   There were no ST segment depressions throughout exercise, in fact, the negative T-wave in leads 2 and V5 improved during exercise.  It should be noted that the patient was examined after exercise and the lungs were clear.  Impression: Suboptimal level of exercise for age, with blunted heart rate response and blood pressure response.  Moderate desaturations with exercise to 87% which then resolved.  Premature atrial complexes with atrial couplets increase in stage II and then are suppressed. Improvement in T-wave inversions throughout exercise.     Sean was also evaluated by Dr. Chintan Cabrera who did not detect any arrhythmias on her newly placed generator download, and did not make any recommendations in pacemaker or rhythm management.  He asked to see her again in 6 months.     My impression from that visit was Sean had improved since her last clinic visit.  She had improved symptoms of exercise intolerance, improved energy level, improved activity, and her palpitations had resolved. She was now exercising regularly and losing weight.  She still had poor  exercise tolerance, however, and desaturated with exercise, which I suspected was intrapulmonary shunting.  I did not detect pulmonary edema on exam during exercise.  She had a progression in left ventricular dysfunction for which I found no etiology.  She continued to have mild pulmonary valve stenosis with mild to moderate pulmonary insufficiency of her bioprosthetic valve.  Her right ventricular function had improved. She also had mild tricuspid valve stenosis, and moderate right atrial and left atrial dilatation which had increased.  Her recent Holter monitor has showed no ventricular ectopy, rather PACs, atrial couplets and triplets.  I felt that Sean's cardiac condition had improved with increasing diuresis and increasing exercise capacity and also weight loss.  I continued to be concerned by her diagnosis of constrictive pericarditis, her left ventricular dysfunction, and her intrapulmonary shunting.  I also felt that weight lifting was not the best exercise for her, and asked her to switch more to cardio training and less weight lifting.     Sean was instructed to increase her coenzyme Q10 to 200 mg by mouth twice a day, and stay on the same doses of her other medications.  Laboratory studies were ordered including a CBC, CMP, BNP, complement C3, CRP and a sedimentation rate, but were not obtained.   She was given a return to this clinic in 3 months time with an EKG and an echocardiogram.  SBE prophylaxis continued to be in order for all dental and surgical procedures.     Sean  was seen on October 4, 2018.  Since her previous clinic visit 4 months prior, she continued to improve and felt well.  She has continue to diet and lost another 14 lb in 4 months, in addition to the 12 lb she had lost at her last clinic visit.  She is exercising regularly which includes Pilates, ballet bar, light weight training, and cardio roping machine, in addition to once a week with a .  She denied  shortness of breath, chest pain, palpitations or swelling.  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  She was taking her medicine regularly.  She continues to have a waqar IUD, which she was planning on removing in March and then attempting pregnancy.  Her previous pacemaker download was October 3, 2018, the results of which were not yet available.     Sean was taking enalapril 5 mg in the morning and 2.5 mg in the evening, coenzyme Q10 200 mg p.o. b.i.d., metoprolol tartrate 50 mg p.o. b.i.d., Lipitor 20 mg p.o. q.day, Lasix 40 mg p.o. q.day to which she has a good diuretic response, levo cetirizine 5 mg p.o. q.day, Synthroid 137 mcg p.o. q.day, and Singulair 10 mg p.o. q.day.     Physical exam revealed a healthy-appearing and pleasant young woman in no acute distress.  Vital signs showed a height 175.3 cm or 5 ft 9 in, and weight of 109.6 kg or 241 lb 12 oz, which is a 14 lb weight decrease since her previous clinic visit in May, 2018.  Pulse was 86 beats per minute, pulse oximetry in room air 97%, and blood pressure in the right arm 107/68 mm of mercury.     Examination of the skin showed it to be pink and well perfused.  The lungs were clear.  Palpation of the precordium showed to be normal with a well-healed midline scar.  First heart sound was normal and 2nd heart sound was widely split.  There was a grade 2/6 systolic ejection murmur best heard at the left upper sternal border, but well over the precordium.  Diastole was clear.  The liver edge was at the right costal margin and improved.  Pulses were 2+ bilaterally. There was no peripheral edema.  The pacemaker was palpated in the abdomen.     EKG was obtained which showed a paced atrial rhythm at 68 beats per minute, right ventricular conduction delay, and T-wave inversions in leads I,II, AVF, V1 - V6, unchanged.     An echocardiogram was obtained which showed evidence for repair of tetralogy of Fallot, and a bioprosthetic  valve in the pulmonary position.  M-mode parameters were as follows:  The aortic root measured 30 mm unchanged, left atrium 44 mm decreased slightly (46 mm), right ventricle 28 mm by m mode and 35 mm by  2D in long axis, unchanged.  The interventricular septum measured 10 and left ventricular posterior wall 11 mm, both normal.  The left ventricular internal dimension in diastole was 52 and systole 36 mm giving a calculated ejection fraction of 59%, he which has improved and is now normal (previously 47%).  The pulmonary valve leaflets were thickened with decreased mobility, with the posterior leaflet being frozen, as noted previously.  The pulmonary valve annulus was small at 16 mm.  The right atrium was dilated measuring 56 x 47 mm which had increased.  There was decreased opening of the tricuspid valve with tethering of the septal leaflet.  Both right and left ventricular function were judged subjectively to be within normal limits.  The ventricular septal defect patch was in proper position.  Doppler analysis using pulsed, continuous and color-flow:  No tricuspid insufficiency.  Peak gradient across the tricuspid valve was 11 peak and 6 mean mm of mercury, unchanged.  Peak gradient across the bioprosthetic pulmonary valve was 30 peak and 18 mean mm of mercury, slightly increased parentheses 25 mm of mercury peak).  Pulmonary insufficiency was present which was mild and improved.  No mitral insufficiency.  No aortic stenosis or aortic insufficiency.  The gradient across the mitral valve was 2 peak and 1 mean mmHg with E wave dominance suggestive of constrictive pericarditis.  Impression:  Tetralogy of Fallot status post placement of a bioprosthetic valve in the pulmonary position.  Normal right ventricular size and function. Normal left ventricular size and function which has returned to normal. Hypoplastic pulmonary annulus measuring 16 mm, with thickened leaflets and frozen posterior leaflet, with a 30 mm Hg  peak gradient suggesting mild obstruction which has increased slightly, and mild pulmonary insufficiency which has improved.  Dilated right atrium which has increased to 56 x 47 mm, and dilated left atrium which is stable to slightly improved at 44 mm.  Decreased excursion of the septal leaflet of the tricuspid valve leading to reduced flow and an 11 peak and 6 mean mmHg gradient, unchanged.  No gradient across mitral valve but E wave dominance, consistent with constrictive pericarditis.       An exercise stress test was obtained which showed that Sean exercised 7 min and 18 sec of the Sam protocol, 1 min and 18 sec into stage 3, which was below predicted for an adult, but improved from the previous study by about 30 sec.  Baseline heart rate was 63 beats per minute, increased to 159 beats per minute at peak exercise, before decreasing back to 75 beats per minute at the end of 7 min of recovery. Baseline blood pressure was 115/64 mm of mercury, increased to 140/77 mm of mercury 1 min after peak exercise, before decreasing back to 108/65 mm of mercury 7 min into recovery.  Saturation was 98% at baseline, and then decreased to 94% at peak exercise, before returning to 99% 1 min into recovery.  Patient remained fully saturated throughout the remainder of the study.  Baseline rhythm was sinus at 63 beats per minute, and patient continued in sinus rhythm throughout exercise and recovery.  Occasional unifocal PVCs were seen starting in stage II and III, which resolved in recovery.  There were no PACs.  Baseline EKG had negative T-waves in leads 1, 2, AVF, and V1 through V6.  During exercise, T-waves became flattened in leads 1 and 2 and less negative in leads AVF, V1 through V6.  At peak exercise, T-waves were upright in leads 1, and 2, flattened in AVF, and upright in V1 through V6.  During recovery, T-waves again became negative in leads 1, 2, and AVF, V1 through V6.  Impression:  Low level of exercise predicted  for age, however an improvement from the previous study of 30 sec.  Normal blood pressure and her heart rate response to exercise.  Mild desaturations to 94% at peak exercise, which recovers within 1 min to 99%.  Occasional unifocal PVCs during exercise which resolved in recovery, and no PACs, which is an improvement from the previous study.  Inverted T-waves in inferior and precordial leads which convert to positive during exercise, and then revert back to negative during recovery.     My impression from this visit was that Sean had improvement in cardiac function, rhythm, energy, and exercise tolerance.  I attributed this to an improvement in left ventricular function, improvement in right ventricular function, a decrease in weight, an increase in exercise training, and an improvement in arrhythmias.  She still has evidence for a thickened and poorly functioning bioprosthetic valve, however the gradient across it was only 30 mm of mercury peak, with mild pulmonary insufficiency, and her right ventricular function had improved to normal. I still felt that she would need a Serina valve placement in the near future.  She still had evidence for constrictive pericarditis as evidence by her dilated right and left atria, and Doppler flow pattern in the left ventricle, however, I saw an overall improvement in cardiac function leading to decreased symptoms.     Sean's reasons for improvement were probably multifactorial, and I felt no reason to change any of her management.  Therefore, Sean  was instructed to continue on the same doses of her medications.  She was to continue her successful diet and exercise program.  I contacted Dr. Cabrera to ask for the results of her recent pacemaker download.  I suspected that she and her fetus would tolerate a pregnancy in the future.  Sean was instructed to return to this clinic in about 3 months time, at which point she would have a dual appointment with  electrophysiologist Dr. Cabrera and myself, with an EKG and an echocardiogram.  Laboratory work was ordered including CBC, CMP, BNP, thyroid function studies and lipid profile.  SBE prophylaxis was in order for dental and surgical procedures.          Laboratory work obtained on October 4th, 2018 showed a T4 normal at 9.6 mcg/dL, T3 at the lower limit normal 2.3 pg/mL, TSH at the lower limit of normal at 0.414 micro international units per mL, cholesterol was 119 mg/dL, triglycerides 59 mg per dL, HDL was low at 34 mg/dL, LDL 73.2 mg/dL.  C3 complement was now normal at 145 mg/dL, high sensitivity CRP was now normal at 2 point 7 5 mg per dL, and BNP was elevated at 178 pg/mL which had improved.  The CMP was normal except for an elevated total bilirubin of 1.8 mg/dL which had improved.  The CBC was normal with a hemoglobin of 12.4 grams/deciliter and hematocrit 38.5%.     Sean was seen in the [facility] on February 14, 2019.  Since her clinic visit 4 months prior, or near has been feeling well.  She is exercising, which consists doing some classes, rides a bicycle for 30 min, and works with a , and her exercise capacity has improved.  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 She reports a good energy level which has improved.  She denies chest pain, shortness of breath or palpitations.  She does notice swelling of her lower legs when working her 12 hr shifts, during which time she is on her feet quite a bit.     Sean is taking Lasix 40 mg p.o. q.day, to which she has a good diuretic response, metoprolol tartrate 50 mg p.o. b.i.d., enalapril 5 mg in the morning and 2.5 mg in the evening, coenzyme Q10 200 mg p.o. b.i.d., Synthroid 137 mcg p.o. q.day, Lipitor 20 mg p.o. q.day, cetirizine 10 mg p.o. q.day, fish oil and a multivitamin.  She has stopped taking Singulair.     Physical exam revealed an obese, pleasant and  healthy-appearing young woman in no acute distress.  Vital signs showed a height of 5 ft 9 in or 175.3 cm, and weight of 112.4 kg or 247 lb 13 oz, which is a 3 kg weight increase since her previous clinic visit.  Blood pressure was 117/63 mm of mercury in the right arm, pulsed oximetry in room air 97%, and pulse was 71 beats per min.     Examination of the skin showed it to be pink and well perfused.  The lungs were clear.  Palpation of the precordium showed to be normal with a well-healed midline scar.  First heart sound was normal and 2nd heart sound was split.  There was a grade 2-3/6 systolic ejection murmur heard best at the left upper sternal border with minimal radiation.  There was also a grade 2/6 diastolic murmur heard at the left lower sternal border, and also probably a diastolic rumble.  The liver edge was 2 cm below the right costal margin, unchanged.  The pacemaker was palpated in the upper mid abdomen.  Pulses were 2+ bilaterally.  There was no peripheral edema.     An EKG was obtained which showed an atrially paced rhythm at 70 beats per minute, with a long PVR interval of 344 milliseconds.  There was an RV conduction delay, and negative T-waves in leads 1, 2, V5 and V6 which were unchanged.  Compared to the previous study, the patient is now atrially paced.     An echocardiogram was obtained which had very poor two-dimensional imaging, secondary to a different machine being use, and patient obesity.  Chamber sizes were made on 2 dimensional imaging as M-mode measurements were suboptimal.  The interventricular septum measured 10 and the left ventricular posterior wall 11 mm, no change.  The right ventricular dimension in the long axis view was 32 mm, improved (35 mm).  The left ventricular internal dimension diastole was 53 and in systole 40 mm giving a calculated ejection fraction of 49%, showing mild LV dysfunction which has decreased (previously 59%).  The left atrium was dilated measuring 45 mm,  unchanged.  The aortic root measured 31 mm, unchanged.  The ascending aorta measured 25 mm.  The pulmonary valve was difficult to image, and the posterior leaflet was thickened and had decreased motion, and the pulmonary valve annulus was 22 mm.  In the four-chamber view, the left atrium measured 56 x 52 mm.  The right atrium measured 52 x 45 mm, unchanged.  The tricuspid valve was poorly seen.  Subcostal views could not be obtained.  The aortic arch was left-sided and unobstructed.  Doppler analysis using pulse, continuous and color-flow:  Mitral insufficiency was present which was mild, a new finding.  Pulmonary insufficiency was present which was mild with a end-diastolic gradient of 4 mm of mercury, unchanged.  The gradient across the bioprosthetic pulmonary valve was 37 mm of mercury peak, which has increased since the previous value (30 mm of mercury).  The gradient across the tricuspid valve was 13 peak and 6 mean mm of mercury, similar to previous value.  The E to a ratio across the mitral valve was 1.52.  Tricuspid insufficiency was present which was trivial.  No aortic stenosis or aortic insufficiency.  Impression:  Very poor imaging probably secondary to technical difficulties as well as patient obesity, leading to difficulty imaging and measuring all cardiac structures.  Tetralogy of Fallot status post pulmonary valve replacement.  Normal size right ventricle which has increased since the previous study, with subjectively normal right ventricular function.  Mildly decreased left ventricular function with ejection fraction 49%.  Significantly dilated left atrium and right atrium, unchanged.  Decreased motion and thickening of the posterior leaflet of the bioprosthetic pulmonary valve, with a 37 mm Hg gradient which has increased.  New mild mitral insufficiency.  Mild pulmonary insufficiency.  Mild to moderate tricuspid stenosis, unchanged.       Laboratory values were obtained which showed a normal CBC  with a hemoglobin of 13 grams/deciliter and a hematocrit of 40%.  The free T4 was normal at 1.4, and the CMP was normal except for a mildly elevated bilirubin of 1.8, unchanged.  The BNP was mildly elevated at 156 pg/mL, unchanged.  The high density CRP was normal at 2.24.  The free T3, TSH, vitamin-D and vitamin-B levels were pending.  These values subsequently showed free T3 was mildly decreased at 2.2 pg/mL, vitamin D was decreased at 16 ng/mL, cholesterol 131 mg/dL, HDL was decreased at 30.6 mg/dL, vitamin B12 normal at 923 pg/mL.  (She was subsequently placed on vitamin D 30141 units p.o. Q.week.)     Sean was also evaluated by electrophysiologist Dr. Chintan Cabrera.  Dr. Cabrera found that there were no new arrhythmias and the pacemaker was functioning well.  He did not recommend any changes.  Please see Dr. Cabrera note for more details.     My impression from this visit was that Sean may have had a decrease in left ventricular function the etiology of which is not evident, but the quality of the echocardiogram was so poor that I feel this needs to be repeated on a better quality machine.  Her right ventricular size and function are normal.  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  Her bioprosthetic pulmonary valve gradient has increased from 30-37 mm of mercury peak, and continues to have a posterior leaflet which moves poorly.  The previous inflammatory process that occurred last year has resolved, as evidenced by a return of her CRP to normal.  I wondered whether this participated in the development of her constrictive pericarditis, although she has another reason to have this from her epicardial AICD patch.     Sean desires to become pregnant within the next few months.  In terms of tolerating her pregnancy, her biggest risk factor is the constrictive pericarditis.  I still feel that the stenotic bioprosthetic  pulmonary valve is contributing to her cardiac dysfunction, and that she should have a Serina valve placed prior to pregnancy.  I feel she will be at moderate risk for pregnancy but should be able to tolerated with careful monitoring from the Cardiology and Maternal-Fetal services.     Despite these issues, Sean continues to improve clinically, as evidenced by improved energy and exercise tolerance.  I am attributing this to her weight loss and exercise training.  Therefore, Sean was instructed to stay on the same doses of her medications.  I have asked her to return to this clinic in 1 months time with an EKG, an echocardiogram on a better quality machine, and an exercise stress test.  Following that I will present her in conference for Serina valve placement prior to pregnancy.  I will also arrange for her to have a maternal fetal evaluation in the future.  (As mentioned above, she was also subsequently placed on vitamin D3 75535 units p.o. q.week).     Sean Baez was seen in the Adult with [facility]ital Heart Disease [facility] on April 4, 2019.  She is coming in today because the echocardiogram performed at her previous visit was inadequate, and another study is scheduled on a new machine.  Additionally, she is being assessed for suitability of pregnancy.  Additionally, an exercise stress test is scheduled for this visit.  Since her previous clinic visit in February, 2019, or near states that she has been feeling well.  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  She continues to exercise with a , does aerobic exercise for almost an hour and does some mild weight training.  She denies chest pain, palpitations, shortness of breath, swelling or dizziness.  Her father was recently diagnosed with diabetes mellitus.     eSan is currently taking metoprolol 50 mg p.o. b.i.d., enalapril 5 mg in the  morning and 2.5 mg in the evening, Lasix 40 mg p.o. q.day, Synthroid 137 mcg p.o. q.day, BuSpar 10 mg p.o. b.i.d., fish oil, multivitamin, coenzyme Q10 200 mg p.o. b.i.d., cetirizine 10 mg PO q.day, atorvastatin 20 mg PO q.day, and vitamin D 99738 units p.o. q.week.     Physical exam revealed an obese, pleasant young woman in no acute distress.  Vital signs showed a height of 175.3 cm or 5 ft 9 in, and weight of 112.8 kg or 248 lb 9 oz, unchanged.  Blood pressure in the right arm was 117/78 mm of mercury, pulse oximetry in room air 99% and pulse 95 beats per min.     Examination of the skin showed it to be pink and well perfused.  The lungs were clear.  Palpation of the precordium showed to be normal with a well-healed midline scar.  First heart sound was normal and 2nd heart sound was widely split.  There was a grade 3/6 harsh systolic ejection murmur heard best at the left upper sternal border, and well over the precordium.  There was also a 1/6 diastolic murmur heard along the left lower sternal border.  The liver edge was at the right costal margin, improved and the pacemaker was palpated in the abdomen.  Pulses were 2+ bilaterally. There was no peripheral edema.     An EKG was obtained which showed sinus rhythm with first-degree block at 80 beats per minute, right ventricular hypertrophy, and T-wave inversions in leads 1 2 and V1 suggestive of ischemia, no change.     An echocardiogram was obtained which showed tetralogy of Fallot status post repair with a bioprosthetic valve in the pulmonary position.  M-mode parameters were as follows:  The aortic root measured 32 mm (Z 0.9) unchanged and the left atrium measured 44 mm (Z 0.9) unchanged.  The aortic annulus measured 23 mm (Z 1.3).  The interventricular septum measured 10 mm (Z-0.5) and the left ventricular posterior wall 10 mm (Z-0.2), both unchanged.  The left ventricular internal dimension diastole measured 53 mm and in systole 38 mm, giving a calculated  ejection fraction of 54%.  The ejection fraction was borderline low but has improved since the previous study (LV EF 49%).  The right ventricle measured 35 mm (Z 0.6) increased from the previous study (32 mm).  The tricuspid valve domed in diastole with what appear to be septal leaflet for shortening.  The tricuspid valve annulus measured 28 mm and the mitral valve annulus 34 mm.  A bioprosthetic valve was in the pulmonary position with the posterior leaflet frozen and the anterior leaflet moving.  The right pulmonary artery measured 12 mm proximally (Z-1.4) and 18 mm distally.  The left pulmonary artery measured 15 mm proximally (Z-0.6) and 20 mm distally.  Right ventricular function was judged subjectively to be normal.  The right atrium was dilated and measured 57 x 52 mm which is increased since the previous study (52 x 45 mm).  The atrial septum is intact.  The inferior vena cava drain normally to the right atrium and measured 15 mm in diameter.  The right superior vena cava drain normally to the right atrium.  The aortic arch was left-sided and unobstructed.  Doppler analysis using pulsed, continuous and color-flow:  Tricuspid stenosis was present with an 11 peak and 6 mean mm Hg gradient, mild and unchanged.  Tricuspid insufficiency was present which was trace, with a gradient of 36 mm of mercury, indicating mildly elevated right ventricular pressures which has increased since the previous study (23 mm Hg).  Pulmonary insufficiency was present which was mild.  The gradient across the bioprosthetic pulmonary valve was 44 mm of mercury peak and 24 mm of mercury mean indicating mild-to-moderate obstruction which has increased since the previous study (37 mm Hg peak).  Mitral insufficiency was present which was mild, unchanged.  No aortic stenosis or aortic insufficiency.  Bidirectional flow in the patent veins indicated right atrial hypertension.  Impression:  Tetralogy of Fallot status post complete repair and  placement of a bioprosthetic valve in the pulmonary position.  Good right ventricular function.  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  Mild pulmonary insufficiency which is unchanged.  Normal branch pulmonary arteries.  Dilated right atrium measuring 57 x 52 mm which has increased. Borderline low left ventricular function which has improved to an ejection fraction of 54%.  Dilated left atrium measuring 44 mm, unchanged.  Mild tricuspid stenosis with 11 peak and 6 mean mm Hg gradient, unchanged.     An exercise stress test was obtained which showed that [place] exercise 8 min and 1 sec of the Sam protocol, demonstrating low level of exercise for age, but improved since the previous study. Baseline heart rate was 67 beats per minute, increased to 142 beats per minute at peak exercise (patient on beta-blocker), before decreasing back to 76 beats per minute after 7 min of recovery. Baseline blood pressure was 110/73 mm of mercury, increased to 146/84 mm of mercury 1 min into recovery, before decreasing back to 116/78 mm of mercury after 7 min of recovery.  Patient remained fully saturated between 98 and 100% throughout exercise and recovery.  Baseline rhythm was sinus rhythm.  Occasional to frequent unifocal PVCs started in stage I and continued into the beginning of stage II.  No PVCs were present during the latter part of stage II, the first 2 min of stage III, and all of recovery.  There were baseline T-wave inversions in leads I and II, and T-wave flattening in leads AVF, V5 and V6.  These changes did not progress throughout exercise and there were no ST segment depressions.  Impression:  Low level of exercise for age but improved since the previous study.  Adequate heart rate response to exercise on beta blocker and adequate blood pressure response to exercise.  Occasional to frequent unifocal PVCs  during stage I and II which are than suppressed by exercise and do not recur.  Baseline T-wave inversions in leads I and II, and T-wave flattening in leads AVF, V5 and V6 which do not progress, nor are there any ST segment depressions with exercise.  Normal arterial saturations.     My impression from this visit is that Sean has a poorly functioning bioprosthetic pulmonary valve that should be replaced prior to her pregnancy.  She also has constrictive pericarditis as evidence today with her dilated right atrium and left atrium, which is the larger threat to her pregnancy, however she has enough risk factors that I feel any hemodynamic abnormalities that can be corrected at a relatively low risk prior to pregnancy should be performed.  My impression is that Sean's overall cardiac condition has improved, probably secondary to her exercise regimen and medical management, rendering her a better candidate for pregnancy.  She has mild tricuspid stenosis which is unchanged.  Her pacemaker is functioning well and her rhythm is controlled on her current dose of metoprolol.     For the above reasons, I will present Steven in cardiac catheterization/surgery Conference for placement of a Serina valve prior to pregnancy.  I have also referred her for a high risk maternal fetal evaluation. Sean was instructed to continue on the same doses of these medications.  She was educated that the enalapril must be stopped prior to pregnancy and we will replace it with a different vaso dilator.  She should continue with her exercise regimen and attempt to lose more weight.  I have asked her to make another appointment in this clinic in about 4 months during our pacemaker Clinic, where her pacemaker and rhythm will be evaluated, and at that time we will obtain some laboratory values, including a repeat vitamin D level.       Further disposition for the cardiac status of Sean Baez will be determined following the discussion of  her case in catheterization conference, her evaluation by the high risk maternal fetal team, and her repeat evaluation in pacemaker clinic in 4 months.  SBE prophylaxis continues to be in order for dental and surgical procedures.    Sean was subsequently presented to the Pediatric Cardiology, Cardiac surgery conference where it was agreed that she would benefit from a Serina valve placement.    Sean Baez was seen in the Adult with Congenital Heart Disease Clinic on July 29, 2019.  Since her previous clinic visit in April, 2019, Saen has generally been feeling well.  She has been studying hard for her internal medicine boards in September.  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  She continues working out 3 times a week on her off weeks, which includes lifting some weights, doing cardio training, and working with a .  She denies chest pain or shortness of breath.  She has rare palpitations of 3 beats in duration, which he notices while lying down about once a week.  She notices mild ankle swelling at the end of her shift.  Her last remote pacemaker interrogation was in May, 2019, which showed no episodes of tachyarrhythmias.  Sean reports a good energy level which has not changed since her previous visit.    Sean is currently taking metoprolol tartrate 50 mg p.o. b.i.d., enalapril 5 mg in the morning and 2.5 mg at night, coenzyme Q10 100 mg p.o. b.i.d., Synthroid 137 mcg p.o. q.day parentheses about to be decreased to 125 mcg a day based on laboratory work), aspirin 81 mg p.o. q.day, furosemide 40 mg p.o. q.day, atorvastatin 20 mg p.o. q.day, BuSpar 10 mg p.o. b.i.d., singular 10 mg p.o. q.day (restarted for allergies) fish oil, multivitamin, and vitamin-D 94433 units p.o. q.week.    Physical exam revealed a healthy-appearing, pleasant, obese young woman in no acute distress.  Vital signs showed a height of 175.3 cm or 5 ft 9 in and weight of  112.9 kg or 249 lb, unchanged.  Blood pressure in the right arm was 114/79 mm of mercury, pulse oximetry in room air 96%, and pulse 87 beats per min.    Examination of the skin showed it to be pink and well perfused.  The lungs were clear.  Palpation of the precordium was normal, with a well-healed midline scar and the pacemaker palpated in the upper abdomen.  First heart sound was normal and 2nd heart sound was widely split.  There was a grade 3/6 somewhat harsh systolic ejection murmur heard best at the left midsternal border and heard well along the left sternal border.  Diastole was clear which has improved (diastolic murmur heard previously).  The liver edge was 1 cm below the right costal margin which has improved.  Pulses were 2+ bilaterally.  There was no peripheral edema.    EKG showed paced atrial rhythm at 71 beats per minute, with right ventricular hypertrophy, and T-wave inversions in leads 1, 2, V5 and V6 suggestive of ischemia, and the EKG is unchanged.    An echocardiogram was obtained which showed evidence for repair of tetralogy of Fallot, with a bioprosthetic valve in the pulmonary position.  M-mode parameters were as follows:  The aortic root measured 31 mm, unchanged and the left atrium was dilated and measured 45 mm, also unchanged.  The aortic annulus was normal at 21 mm.  The interventricular septum measured 11 and the left ventricular posterior wall 11 mm both of which were normal.  The left ventricular internal dimension in diastole was 54 mm and in systole 38 mm giving a calculated ejection fraction of 55%, indicating left ventricular function at the lower limit of normal, unchanged.  A bioprosthetic valve was present in the pulmonary position with an annulus measuring 17 mm, small for the patient's size.  Both leaflets were thickened, the posterior leaflet was frozen, and the anterior leaflet had decreased excursion.  The right pulmonary artery was not well seen and the left pulmonary  artery measured 18 mm in diameter.  The right atrium was dilated and measured 58 x 57 mm in diameter which has increased (previously 57 x 52 mm in diameter).  The tricuspid leaflets were not well seen but did dome in diastole and the tricuspid valve annulus was 26 mm, unchanged.  The mitral valve had normal leaflet motion and the annulus was 33 mm.  The ventricular septal defect patch was in proper position covering a previous malaligned defect.  The atrial septum was intact.  The inferior and superior vena cava drain normally to the right atrium.  The aortic arch was left-sided and unobstructed.  Doppler analysis using pulsed, continuous and color-flow:  Turbulence was detected across the tricuspid valve with a gradient of 12 peak and 6 mean mm of mercury indicating mild to moderate tricuspid valve stenosis, unchanged.  The gradient across the bioprosthetic pulmonary valve was 42 peak and 22 mean mm of mercury indicating mild change pulmonary insufficiency was present which was moderate as indicated by retrograde flow in the main pulmonary artery, and had increased.  The pulmonary artery diastolic pressure was 5 mm of mercury.  Tricuspid insufficiency was present which was physiologic with a gradient of 32 mm of mercury.  Mitral insufficiency was trace.  There was no aortic stenosis or aortic insufficiency.  Retrograde flow was present in the hepatic veins, unchanged.  Impression:  Tetralogy of Fallot status post pulmonary valve replacement.  Stenotic and poorly functioning bioprosthetic pulmonary valve with frozen posterior leaflet, 42 peak and 22 mean mm Hg gradient, and pulmonary insufficiency which has increased to moderate.  Normal right ventricular size and function.  Low normal left ventricular function, unchanged.  Significantly dilated right atrium which has increased.  Dilated left atrium stable.  Mild tricuspid valve stenosis with doming leaflets, unchanged.  Unobstructed left aortic arch.    Sean was  sent for laboratory work, which at the time of this dictation showed:  CBC had hemoglobin 13.7 and hematocrit 41.9%, with a mildly elevated platelet count of [id number]; BNP was mildly elevated at 141 pg/mL, improved.  The comprehensive metabolic profile, vitamin-D level and lipid profile are pending.     My impression from this visit, as it has been in the past, is that Sean requires a pulmonary valve replacement for increasing pulmonary valve stenosis and pulmonary valve insufficiency. Sean will not follow the standard criteria for pulmonary valve replacement, since I believe her right ventricle is not able to dilate secondary to constrictive pericarditis.  Hopefully Sean can have a Serina valve replacement, and I feel that this can wait until after she has taken her internal medicine boards, which will be in September.  Her tricuspid valve stenosis is not significant enough to require valve replacement, in my opinion.  I believe her constrictive pericarditis is stable, and her symptoms have certainly improved with diuresis, exercise and weight loss.  She needs repeat lab work to monitor her vitamin-D and cholesterol levels after treatment.  In my opinion, once she has a well-functioning pulmonary valve, that she will be a reasonable candidate for pregnancy.  I will refer her to the high risk maternal fetal team for evaluation following her internal medicine boards.  Until then, I have instructed Sean to remain on the same doses of her medications.  It is noted that her Synthroid has been decreased. Sean was instructed to return to this clinic during our pacemaker clinic, with an EKG, echocardiogram, and pacemaker evaluation.    The vitamin-D level was subsequently found to be decreased at 26 ng/mL (previously 16 ng per mL), and lipid profile showed cholesterol 140 mg/dL, triglycerides 56 mg/dL, HDL 40 mg/dL, an LDL cholesterol 89 mg/dL.  Sean was advised to continue her vitamin-D supplement and her  atorvastatin.      Sean Baez was seen in the Adult with Congenital Heart Disease Clinic on October 3, 2019.  Since her previous clinic visit in July, 2019, Sean was accepted for a percutaneous pulmonary valve replacement, which is now been scheduled for December 3, 2019.  Sean was also evaluated by maternal fetal specialist Dr. Grande, who also felt that Sean should undergo a pulmonary valve replacement prior to undergoing pregnancy.  She listed the potential complications associated with pacemaker, constrictive pericarditis, arrhythmia, and advanced maternal age even after the valve was replaced successfully, and estimated a 15-20% complication rate.  Following this visit I advised Sean to decrease her enalapril from 7.5 mg to 5 mg q.day, since enalapril is teratogenic.  She was to monitor her blood pressure at home.  The purpose of this visit is to evaluate her response to decreased dose of enalapril, re-evaluate her in preparation for the pulmonary valve replacement, and download her pacemaker.      Since her previous clinic visit in July, 2019, Sean states she is feeling well.  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  She exercises at a gym 4 times a week on her non working weeks.  She was recently diagnosed with lactose intolerance and has stopped eating dairy foods.  She reports a good energy level.  She denies chest pain, shortness of breath or swelling.  She continues to have palpitations about once or twice a week, while lying down at night, which consists of 3-4 beats lasting 1-2 seconds.  This is unchanged.  She also complains of episodes of dizziness which occur during the day about once every 2 weeks, lasting a few seconds, and which are not associated with palpitations.  She has been taking her blood pressures at home, which have ranged in the 1 teens over 60s to 70s.    Sean is currently taking  enalapril which was decreased to 5 mg PO q.day, metoprolol tartrate 50 mg p.o. b.i.d., atorvastatin 20 mg PO q.day, Synthroid 125 mcg p.o. q.day, vitamin D 95400 units p.o. q.week, Co Q10 100 mg p.o. b.i.d., BuSpar 10 mg p.o. b.i.d., singular 10 mg p.o. q p.m., Zyrtec 10 mg p.o. q.a.m., fish oil, multivitamin, and Lasix 40 mg PO q.day to which she has a good diuretic response.    Physical exam revealed an obese, pleasant and healthy-appearing young woman in no acute distress.  Vital signs showed a height of 175.3 cm or 5 ft 9 in, and weight of 112.8 kg or 248 lb 9 oz, unchanged.  Blood pressure in the right arm was 111/71 mm of mercury, pulse oximetry in room air 98%, and pulse 91 beats per min.        Examination of the skin showed it to be pink and well perfused.  The lungs were clear.  Palpation of the precordium showed a well-healed midline scar and was otherwise normal.  First heart sound was normal and 2nd heart sound narrowly split.  There was a grade 2-3/6 systolic ejection murmur heard best at the left midsternal border and more faintly over the precordium.  There was also a 2/6 diastolic murmur heard at the left midsternal border only.  The liver edge was 1 cm below the right costal margin.  The pacemaker was palpated in the upper abdomen.  Pulses were 2+ bilaterally.  There was no peripheral edema.    An EKG was obtained which showed a paced atrial rhythm with prolonged AV conduction 368 milliseconds, right ventricular hypertrophy, and negative T-waves in the inferior and left precordial leads suggestive of ischemia.  The EKG is unchanged.    An echocardiogram was obtained which showed evidence for repair of tetralogy of Fallot.  M-mode parameters were as follows:  The aortic root was mildly dilated measuring 30 mm, unchanged.  The left atrium was significantly dilated measuring 42 mm, slightly improved (previously 45 mm).  The right ventricle was normal size and measured 27 mm, unchanged.  Right  ventricular systolic function was judged subjectively to be normal, but not seen well enough to quantitate.  The interventricular septum measured 12, and the left ventricular posterior wall 12 mm in diameter at the upper limits of normal.  The left ventricular internal dimension in diastole was 54 mm and in systole 36 mm giving a calculated ejection fraction of 63%, and these numbers are normal.  The ventricular septal defect patch was in proper position over the malaligned defect.  A bioprosthetic valve was seen in the pulmonary position with the anterior leaflet frozen and the posterior leaflet thickened and moving poorly, with an annulus of 15 mm, which may have been an underestimate.  The pulmonary arteries were of normal diameter with the right pulmonary artery measuring 14 mm and the left pulmonary artery 15 mm.  There was no pericardial effusion.  The septal leaflet of the tricuspid valve had decreased excursion with the tricuspid valve annulus only measuring 21 mm, compared to the mitral valve annulus measuring 40 mm.  The right atrium was significantly dilated measuring 56 x 53 mm, slightly improved (previously 58 x 57 mm).  Both the inferior and superior vena cava drain normally to the right atrium.  The left aortic arch was unobstructed.  Doppler analysis using pulse, continuous and color-flow:  Tricuspid stenosis was present with a 13 peak and 7 mean mm Hg gradient across the valve indicating moderate obstruction, unchanged.  Tricuspid insufficiency was present which was mild, with a 32 mm Hg gradient indicating mildly elevated RV pressures which was probably an underestimate.  The gradient across the bioprosthetic pulmonary valve was 38 mm Hg peak, unchanged.  There was moderate to moderately severe pulmonary insufficiency which has increased.  Mild mitral regurgitation, unchanged.  No aortic insufficiency or aortic stenosis.  No residual ventricular septal defect.  Impression:  Tetralogy of Fallot  status post pulmonary valve replacement.  Sten out a pulmonary valve with frozen anterior leaflet and thickened posterior leaflet with poor excursion, 38 mm Hg peak gradient and moderate to moderately severe pulmonary insufficiency.  Subjectively normal right ventricular function although difficult to quantitate.  Normal size right ventricle.  Dilated left atrium, improved and dilated right atrium, also improved.  Tricuspid stenosis due to poor excursion of the septal leaflet with a 13 peak and 7 mean mm Hg gradient indicating moderate obstruction, unchanged.  Mild mitral regurgitation.  Normal left ventricular function.    A pacemaker download was performed which showed no arrhythmias, and 9 years left of battery life.  A review of her remote download from August 25, 2019, showed a 5 beat run of supraventricular tachycardia on July 15, 2019.    My impression from this visit was that Sean requires a pulmonary valve replacement and this is now scheduled for December 3, 2019 by Dr. Justice.  I believe she will have improvement in cardiac output and exercise capacity following this procedure.  Sean continues to have normal left ventricular size and function, although the negative T-wave throughout the inferior an left precordial leads suggests that she continues to have some myocardial ischemia, which is unchanged.  Sean continues to complain of palpitations, which are probably short episodes of supraventricular tachycardia, one of which was documented on her last remote pacemaker evaluation.  I am wondering whether this is secondary to the singular, which she takes at night.  For that reason, she should stop this medication if tolerated, to see if her palpitations improved. 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  Since her atria are smaller, and she is feeling better, this may slowly be improving which would be an excellent  prognostic factor for her upcoming pregnancy.  Since Sean is planning pregnancy, and since her blood pressure is under good control, I would like to continue to taper the enalapril.  If she cannot come off enalapril than I will switch her to methyldopa for her pregnancy.  I also believe that Sean may be able to decrease her diuretic, since she does not have peripheral edema and since she continues to have a brisk diuretic response to her current dose.    Therefore, Sean was instructed to decrease her enalapril from 5 mg to 2.5 mg PO q.day, and continue to monitor her blood pressure at home.  She should stop her singular and see whether her palpitations are improving.  She should decrease her Lasix to every other day about 1 week later, and monitor herself for signs of peripheral edema.  She should begin taking prenatal vitamins.  These recommendations may change following her pulmonary valve placement.  She was asked to make an appointment in this clinic 1 week following her pulmonary valve placement.    Further disposition for the cardiac status of  Sean Baez will be determined following the her pulmonary valve placement scheduled for December 3rd, her blood pressure response to the decreased dose of enalapril, her response to the decreased dose of Lasix, and her response to stopping her Singular.      Sean underwent transcatheter placement of an Kelley 26 Joe 3 valve in the pulmonary position by Dr. Marlyn Early December 3, 2019.   Her initial pressures were as follows:  RA A-wave 15 mm of mercury mean 12 mm of mercury, RV 49/11 mm of mercury, and aorta was 101/51 with a mean of 67 mm of mercury cardiac output 2.85 liters/minute per meter squared, SVR 8.7 resistance units times meter squared.  After placement of the Kelley valve pressures were as follows:    Right ventricle 30/12 mm of mercury and aorta 123/66 with a mean of 86 mm of mercury.  Thus, right ventricular pressure decreased  from half systemic to 1/4 systemic.  Initially, there was a 20 mm Hg peak gradient across the pulmonary valve with moderate pulmonary valve insufficiency.  Angiography showed no post implant pulmonary valve insufficiency.    Sean Baez [facility] on December 12, 2019. This is her first post visit following her pulmonary valve implantation.  Since the valve was placed, colette orozco has been feeling better.  She has improved breathing and improved energy.  She has not exercised since the valve placement, but denies chest pain, shortness of breath, palpitations, swelling or dizziness.  She did not feel any difference decreasing her dose of enalapril and Lasix, or stopping the singular.  Following her valve placement, Dr. Currie decreased her metoprolol from 50 mg b.i.d. to 25 mg p.o. B.i.d.  She was begun on low-dose aspirin after the valve replacement.  Her previous episodes of palpitations have resolved.    Sean is currently taking vitamin-D 14590 units q.week, metoprolol succinate which has been decreased to 25 mg p.o. b.i.d., enalapril which has been decreased to 5 mg PO q.day, aspirin 81 mg PO q.day which was started after the valve placement, Lasix which has been decreased to 40 mg every other day and to which she has a good diuretic response, Endy all brand of Co Q10 100 mg p.o. b.i.d., Synthroid 125 mcg p.o. q.day, BuSpar 10 mg p.o. b.i.d., atorvastatin 20 mg PO q.day, fish oil b.i.d., multivitamin and cetirizine 10 mg PO q.day.    Physical exam revealed an obese, pleasant and healthy-appearing young woman in no acute distress.  Vital signs showed a height 175.3 cm or 5 ft 9 in and weight of 112.1 kg or 247 lb, which is a weight decrease of about a half a kg.  Blood pressure in the right arm was 110/75 mm of mercury, pulse oximetry in room air 98% and pulse 87 beats per min.    Examination of the skin showed it to be pink and well perfused.  The lungs  were clear.  Palpation of the precordium showed to be normal with a well-healed midline scar.  First heart sound was normal and 2nd heart sound was widely split.  There was a grade 2/6 soft systolic ejection murmur localized to the left upper sternal border, and a softer since the previous visit.  Diastole was clear as the diastolic murmur has also resolved.  The liver edge was at the right costal margin.  Pulses were 2+ bilaterally.  There was bruising around the right inguinal area and the left inguinal area was clean.  There was no peripheral edema.  The pacemaker was palpated in the abdomen.    EKG showed a paced atrial rhythm at 65 beats per minute with prolonged AV conduction of 350 milliseconds, right bundle-branch block, and T-wave inversions in leads I, II, AVF and V4-V6, unchanged.    An echocardiogram was obtained which showed 4 cardiac chambers in the normal location with evidence for repair of tetralogy of Fallot, and a bioprosthetic valve in the pulmonary position.  M-mode parameters were as follows:  The aortic root measured 33 mm, unchanged and the left atrium was dilated at 43 mm, also unchanged.  The aortic annulus measured 23 mm.  The right ventricle measured 28 mm, unchanged.  The interventricular septum measured 12 mm and the left ventricular posterior wall 12 mm both unchanged.  The left ventricular internal dimension in diastole was 49 mm and in systole 35 mm giving a calculated ejection fraction of 59% and these numbers were normal.  The ventricular septal defect patch was in position over a malaligned defect.  A stent was visualized in the right ventricular outflow tract.  There was a bioprosthetic valve within the stent with an annulus measuring 22 mm and with thin, flexible and well moving leaflets.  The right pulmonary artery measured 16 mm and the left pulmonary artery 16 mm, both normal.  Thickening was seen at the left ventricular apex indicative of the AICD patch.  The right atrium  was dilated measuring 56 x 52 mm, unchanged.  The coronary sinus was dilated.  There was decreased excursion of the septal leaflet of the tricuspid valve.  Right ventricular function was judged subjectively to be normal but not quantitated.  The aortic arch was left-sided and unobstructed.  There was no pericardial effusion.  Subcostal views were poor although the atrial septum was seen to be intact.  The inferior vena cava could not be imaged.  The superior vena cava drain normally to the right atrium.  Doppler analysis using pulsed, continuous and color-flow:  Mitral insufficiency was present which was mild, unchanged.  No tricuspid regurgitation.  The gradient across the tricuspid valve was 11 mm of mercury peak and 5 mm of mercury mean indicating mild tricuspid stenosis, unchanged.  The gradient across the pulmonary valve was 26 mm of mercury peak, indicating mild obstruction which has improved.  No pulmonary insufficiency, which has improved.  No aortic stenosis or aortic insufficiency.  Impression:  Tetralogy of Fallot status post repair, and most recently status post Kelley valve placement in the pulmonary position.  Well-functioning bioprosthetic valve with thin and flexible leaflets, a mild gradient of 26 mm of mercury peak, and no pulmonary insufficiency.  Subjectively normal right ventricular size and function.  Normal left ventricular size and function.  Dilated left atrium and dilated right atrium, unchanged.  Mild tricuspid stenosis with an 11 peak and 5 mean mm Hg gradient due to decreased excursion of the septal leaflet of the tricuspid valve, unchanged.    It is my impression that Sean has improved significantly after placement of her bioprosthetic valve in the pulmonary position.  The valve is functioning well with mild stenosis and no insufficiency.  More importantly, her symptoms of shortness of breath and fatigue have improved.  She feels well and this is actually the best I have seen her  look in several years.  Her palpitations have also resolved.  Her blood pressure continues to be normal despite decreasing her antihypertensive medications, and I am wondering if we could continue to decrease the enalapril since we will need to stop it for future pregnancy.  She continues on high-dose vitamin-D supplementation.  I am clearing her for pregnancy and delivery from a cardiovascular point of view.    Therefore, Sean was instructed to decrease her enalapril to 2.5 mg once a day and to continue to take her blood pressures at home.  If her systolic is less than 120 mm of mercury and her diastolic is less than 80 mm of mercury, she should stop her enalapril.  She was instructed to continue routine dental cleanings at least twice a year with SBE prophylaxis since she is at increased risk for endocarditis with a percutaneously placed pulmonary valve.  She should stop her high dose vitamin D and take 2000 units per day.  Sean was given an appointment in this clinic in 1 months time with a pacemaker download, exercise stress test, and we will at that time check a vitamin-D level and other basic labs.    I am very pleased with the result of the pulmonary valve replacement.  Further disposition for the cardiac status of Sean Baez will be determined following the response to decreasing and maybe stopping the enalapril, and her pacemaker download an exercise stress test and laboratory work in about 1 month.  SBE prophylaxis continues to be in order for dental and surgical procedures.    Thank you very much for allowing up to participate the care of your patient.       Sincerely      Chantel Saleh       All Flowsheet Templates

## 2019-12-27 ENCOUNTER — TELEPHONE (OUTPATIENT)
Dept: PEDIATRIC CARDIOLOGY | Facility: CLINIC | Age: 34
End: 2019-12-27

## 2019-12-27 NOTE — TELEPHONE ENCOUNTER
Left message requesting perform REMOTE device check. Due Monday. May do anytime this weekend. Call back number left in message if any questions or issues.

## 2019-12-30 ENCOUNTER — CLINICAL SUPPORT (OUTPATIENT)
Dept: PEDIATRIC CARDIOLOGY | Facility: CLINIC | Age: 34
End: 2019-12-30
Attending: PEDIATRICS
Payer: COMMERCIAL

## 2019-12-30 DIAGNOSIS — I47.20 VT (VENTRICULAR TACHYCARDIA): ICD-10-CM

## 2019-12-30 DIAGNOSIS — Q21.3 TOF (TETRALOGY OF FALLOT): ICD-10-CM

## 2019-12-30 DIAGNOSIS — Q24.9 ADULT CONGENITAL HEART DISEASE: ICD-10-CM

## 2019-12-30 DIAGNOSIS — I49.5 SINUS NODE DYSFUNCTION: ICD-10-CM

## 2019-12-30 PROCEDURE — 93296 CV ICD REMOTE PEDIATRICS (CUPID ONLY): ICD-10-PCS | Mod: S$GLB,,, | Performed by: PEDIATRICS

## 2019-12-30 PROCEDURE — 93295 CV ICD REMOTE PEDIATRICS (CUPID ONLY): ICD-10-PCS | Mod: S$GLB,,, | Performed by: PEDIATRICS

## 2019-12-30 PROCEDURE — 93296 REM INTERROG EVL PM/IDS: CPT | Mod: S$GLB,,, | Performed by: PEDIATRICS

## 2019-12-30 PROCEDURE — 93295 DEV INTERROG REMOTE 1/2/MLT: CPT | Mod: S$GLB,,, | Performed by: PEDIATRICS

## 2020-01-01 LAB
AV DELAY - LONGEST: 180 MSEC
BATTERY VOLTAGE (V): 3 V
CHARGE TIME (SEC): 3.7 SEC
ERI (V): 2.73 V
HV IMPEDANCE (OHM): 62 OHM
IMPEDANCE RA LEAD (NATIVE): 722 OHMS
IMPEDANCE RA LEAD: 608 OHMS
OHS CV DC PP MS1: 0.4 MS
OHS CV DC PP MS2: 0.4 MS
OHS CV DC PP V1: 1.5 V
OHS CV DC PP V2: 2 V
P/R-WAVE RA LEAD (NATIVE): 7 MV
P/R-WAVE RA LEAD: 1 MV
PV DELAY - LONGEST: 150 MSEC
THRESHOLD MS RA LEAD (NATIVE): 0.4 MS
THRESHOLD MS RA LEAD: 0.4 MS
THRESHOLD V RA LEAD (NATIVE): 1 V
THRESHOLD V RA LEAD: 0.88 V

## 2020-01-06 RX ORDER — FUROSEMIDE 40 MG/1
TABLET ORAL
Qty: 30 TABLET | Refills: 0 | Status: SHIPPED | OUTPATIENT
Start: 2020-01-06 | End: 2020-02-17

## 2020-01-06 RX ORDER — ATORVASTATIN CALCIUM 20 MG/1
TABLET, FILM COATED ORAL
Qty: 30 TABLET | Refills: 0 | Status: SHIPPED | OUTPATIENT
Start: 2020-01-06

## 2020-01-08 ENCOUNTER — TELEPHONE (OUTPATIENT)
Dept: PEDIATRIC CARDIOLOGY | Facility: CLINIC | Age: 35
End: 2020-01-08

## 2020-01-08 NOTE — TELEPHONE ENCOUNTER
Spoke with Sean Baez regarding device followup. [facility] will no longer be doing a clinic at Dr Carpenter's office. We would love to take care of you and service your device, but we will need to set you up an appointment in [facility] clinic with Dr Chinchilla to continue REMOTE monitoring. Please let us know if you would like to schedule an appointment or speak with Dr Carpenter to see who they would like you follow up with. Please give us a call back to let us know what you choose to do. We will dis-enroll you from our  REMOTE device service if you choose to follow up elsewhere.

## 2020-01-14 ENCOUNTER — OFFICE VISIT (OUTPATIENT)
Dept: CARDIOLOGY | Facility: CLINIC | Age: 35
End: 2020-01-14
Payer: COMMERCIAL

## 2020-01-14 VITALS
OXYGEN SATURATION: 98 % | SYSTOLIC BLOOD PRESSURE: 123 MMHG | DIASTOLIC BLOOD PRESSURE: 78 MMHG | HEART RATE: 88 BPM | WEIGHT: 254.94 LBS | BODY MASS INDEX: 37.76 KG/M2 | HEIGHT: 69 IN

## 2020-01-14 DIAGNOSIS — I47.10 SVT (SUPRAVENTRICULAR TACHYCARDIA): ICD-10-CM

## 2020-01-14 PROCEDURE — 93288 PR INTERROG EVAL, IN PERSON,PACEMAKER: ICD-10-PCS | Mod: S$GLB,,, | Performed by: PEDIATRICS

## 2020-01-14 PROCEDURE — 99215 PR OFFICE/OUTPT VISIT, EST, LEVL V, 40-54 MIN: ICD-10-PCS | Mod: 25,S$GLB,, | Performed by: PEDIATRICS

## 2020-01-14 PROCEDURE — 3008F PR BODY MASS INDEX (BMI) DOCUMENTED: ICD-10-PCS | Mod: CPTII,S$GLB,, | Performed by: PEDIATRICS

## 2020-01-14 PROCEDURE — 3008F BODY MASS INDEX DOCD: CPT | Mod: CPTII,S$GLB,, | Performed by: PEDIATRICS

## 2020-01-14 PROCEDURE — 93015 PR CARDIAC STRESS TST,COMPLETE: ICD-10-PCS | Mod: S$GLB,,, | Performed by: PEDIATRICS

## 2020-01-14 PROCEDURE — 93288 INTERROG EVL PM/LDLS PM IP: CPT | Mod: S$GLB,,, | Performed by: PEDIATRICS

## 2020-01-14 PROCEDURE — 99215 OFFICE O/P EST HI 40 MIN: CPT | Mod: 25,S$GLB,, | Performed by: PEDIATRICS

## 2020-01-14 PROCEDURE — 93015 CV STRESS TEST SUPVJ I&R: CPT | Mod: S$GLB,,, | Performed by: PEDIATRICS

## 2020-01-14 PROCEDURE — 93000 PR ELECTROCARDIOGRAM, COMPLETE: ICD-10-PCS | Mod: 59,S$GLB,, | Performed by: PEDIATRICS

## 2020-01-14 PROCEDURE — 93000 ELECTROCARDIOGRAM COMPLETE: CPT | Mod: 59,S$GLB,, | Performed by: PEDIATRICS

## 2020-01-14 NOTE — PROGRESS NOTES
Sean Baez was seen in the Adult with Congenital Heart Disease Clinic at [facility] on January 14, 2020.  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  Sean underwent percutaneous pulmonary valve replacement with a  26 mm Kelley Joe 3 valve on December 3, 2019.  She recovered uneventfully from this procedure.  She then developed supraventricular tachycardia in December, 2019, following which her metoprolol was increased.  The purpose of this visit is to re-evaluate her rhythm and perform an exercise stress test.  Her complex course is outlined below.       Sean underwent repair of tetralogy of Fallot in early childhood, and then a pulmonary valve replacement later in childhood.  In May 2005, she had a second pulmonary valve replacement with a 29 mm Mosiac porcine valve in the pulmonary position because of pulmonary insufficiency, and placement of an epicardial pacemaker for sinus node dysfunction, and placement of an automatic intracardiac defibrillator with an epicardial patch for ventricular tachycardia.  The AICD generator was changed in 2011.  We followed her for many years in this clinic with a diagnosis of right ventricular dysfunction, a well-functioning porcine pulmonary valve, ventricular tachycardia and sinus node dysfunction, pacemaker and AICD, tricuspid stenosis, mitral insufficiency, mild left ventricular dysfunction  and obesity. Her rhythm was well controlled over the last several years, with only one inappropriate shock about 9 years ago requiring a pacemaker adjustment.       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  During that time she had reasonable exercise tolerance, pulmonary valve function and no arrhythmias.      At a clinic visit in April, 2017, she was feeling well.  Physical exam showed a grade 2 to 3/6 systolic ejection murmur best heard at the left mid to upper sternal border and over the precordium, and a newly heard diastolic rumble at the left lower sternal border. The liver edge was 2 cm below the right costal margin.  Pulses were 2+ in brachial and 1+ in femoral, although it was difficult to feel her pulse is secondary to obesity.  There was no peripheral edema. An EKG showed sinus rhythm at 68 bpm, normal atrial and ventricular forces, and T-wave inversions in lead 1 V5 and V6 suggestive of ischemia (unchanged). An echocardiogram showed an aortic root of 30 mm, the left atrium was dilated at 44 mm, (no old values for comparison), the right ventricle was 34 mm which was slightly increased from 32 mm, and the fractional area change of 30%, showed mildly reduced function which had improved.  The interventricular septum measured 11 on the left ventricular posterior wall 12 mm which are at the upper limits of normal, the left ventricular internal dimension in diastole was 52 and in systole 36 mm giving him measured ejection fraction of 57%.  The ejection fraction was slightly decreased but improved since the previous value.  The pulmonary valve annulus measured 19 mm. There was slight paradoxic motion of the interventricular septum, with otherwise normal-appearing right and left ventricular function. The tricuspid valve domed in diastole.  The pulmonary valve leaflets could be seen and the pulmonary annulus of 19 mm was probably underestimated.   The right atrium was dilated at 61 x 57 mm.  The left pulmonary artery measured 12 mm and the right pulmonary artery could not be seen.  The aortic arch was left-sided and unobstructed.  The gradient across the tricuspid valve was 9 peak and 4 mean mmHg gradient indicating mild stenosis, unchanged.  There was trivial tricuspid regurgitation with a regurgitant gradient of 24 mmHg suggesting normal right ventricular pressure.  The gradient across the porcine pulmonary valve was 22 peak and 13 mean mmHg showing trivial obstruction, and there was trivial pulmonary insufficiency.  There was mild mitral insufficiency and no residual ventricular septal defect.  An exercise stress test showed karma Harris exercised 7 minutes and 30 seconds of the Sam protocol, and then stopped due to fatigue and leg cramps.  Baseline blood pressure was 108/77 mmHg and then increased to 159/68 mmHg, which may be an underestimate, before returning to baseline.  Baseline heart rate was 68 bpm and increased to 147 bpm before returning to baseline.  There were initially T-wave inversions in lead I V5 and V6, which then reverted to upright during exercise, before becoming negative again in recovery.  There were no other ST segment changes or arrhythmias.      At that visit, I felt  that Steven was stable with her complex congenital heart disease.  Her right and left ventricular function had improved.  Her porcine pulmonary valve was functioning well with only mild stenosis and insufficiency.  She had mild tricuspid stenosis with significant right atrial dilatation, also unchanged.  She had left atrial dilatation which I felt was secondary to her mitral insufficiency and perhaps some diastolic dysfunction, also unchanged.  Per her report, her pacemaker was functioning properly and her rhythm was controlled on her current dose of metoprolol.  I requested records from her electrophysiologist in [place]. No cardiac intervention was recommended   A CBC, CMP, BNP, and lipid profile were drawn which showed a normal CBC, and normal CMP, a BNP of 82 pg/mL, an elevated total cholesterol of 220 mg/dL, a low HDL cholesterol of 36 mg/dL, normal triglycerides, and an elevated LDL cholesterol of 168 mg/dL.  A chest x-ray was obtained at that visit which showed mild cardiomegaly with normal vascularity, epicardial pacing leads on the right atrium and right ventricle and the AICD patch at the apex.  Sean reported that she had become engaged and desired pregnancy in the future. At that visit I felt she would be able to tolerate a pregnancy.       At a clinic in November, 2017, Sean was not feeling well.  Approximately 3 months prior, while walking on the beach, she had the sudden onset of fatigue, shortness of breath, palpitations and sweating, but no chest pain.  Although she sat down and went into air-conditioning, the fatigue, shortness of breath and sweating persisted for another 30 minutes. Following that, she has noticed an increase in fatigue and exercise intolerance.  She has not participated in any exercise for that reason.  She reported these were the same symptoms as before her last valve replacement. Sean also had a very busy life and lots of stresses.  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  She reported increased fatigue with these activities and decreased energy. She had not had a pacemaker check in 9 months.  She was recently started on atorvastatin for elevated cholesterol, which was her only medication change.  She was taking metoprolol succinate 50 mg by mouth daily, enalapril 5 mg in the morning and 2.5 mg at night, aspirin 81 mg by mouth daily, atorvastatin 20 mg by mouth daily, Zoloft 100 mg by mouth daily, and venlafaxine 150 mg  by mouth daily.  Her exam showed she had gained weight to 117 kg, a 2 kg weight increase. Her blood pressure in the right arm was 119/76 mmHg.  Pulse was 91 bpm and pulsed oximetry in room air was 98%.  First heart sound was normal and second heart sound was widely split. There was a grade 2/6 systolic ejection murmur best heard at the left upper sternal border and down the left sternal border.  A diastolic rumble was heard.  The liver was 2 cm below the right costal margin and unchanged.  The pacemaker was palpated in the upper mid abdomen.  Pulses were 2+ bilaterally except for 1+ in the right femoral.  There was no peripheral edema.     EKG showed sinus rhythm at a rate of 73 bpm with first-degree block (386 ms).  T-wave inversions in leads II and aVF, normal ventricular forces, and T wave inversions in leads V1 and V5, with T-wave flattening in V6.  Compared to the previous EKG, it was no change.     Echocardiogram showed the anatomy for tetralogy of Fallot status post complete repair and status post a pulmonary valve replacement.  It should be mentioned that the heart was difficult to visualize and images were of poor quality and attributed to obesity.  The aortic root measured 30 mm, unchanged, left atrium 35 mm, improved, and right ventricle 34 mm, unchanged.  The interventricular septum measured 11 and the left ventricular posterior wall 11 mm both of which were at the upper limits of normal.  The left ventricular internal dimension in diastole was 56 and in systole 43 mm giving a calculated ejection fraction of 46% which has decreased since the previous study.  There was flattening of septal wall motion.  The left ventricular posterior wall bowed posteriorly.  The pulmonary valve leaflets were difficult to see.  The tricuspid valve leaflets were difficult to see, but the tricuspid valve annulus was subjectively smaller than the mitral valve annulus.   Right ventricular function was judged subjectively to be  mildly depressed, but the fractional area change was measured at 30%, which is within normal limits.   The branch pulmonary arteries could not be imaged. The aortic arch was left-sided and unobstructed.  The gradient across the tricuspid valve was 10 peak and 5 mean mmHg indicating mild tricuspid stenosis, unchanged.  The gradient across the bioprosthetic pulmonary valve was 23 peak mmHg, unchanged.  Trivial pulmonary insufficiency.  No tricuspid insufficiency.  No aortic stenosis or aortic insufficiency.  Trivial mitral insufficiency, improved.  No aortic arch obstruction.       The pacemaker was then downloaded by the [employer] representative, and it showed that she is atrially paced 6% of the time with no ventricular pacing.  She had a prolonged AV interval of 370 ms. Her underlying rhythm was sinus bradycardia with a ventricular rate in the 40s.  She had no abnormal tachycardias greater than 150 bpm.  Her atrial and ventricular thresholds were good.  She had less than one year life on her battery.  We adjusted her pacemaker settings to have rates detected (monitor zone) greater than 130 bpm for 32 beats in duration.     At that visit,  my impression was Sean had a decrease in exercise tolerance and an increase in fatigue which was cardiac related.  She was difficult to evaluate by transthoracic echocardiography, probably secondary to obesity. I felt  her episode was secondary to an increase in tricuspid stenosis or bioprosthetic pulmonary valve dysfunction, and a decrease in left ventricular function.  Her symptoms may have been exacerbated by the stresses of finishing her residency, moving across the country, starting a new job, and planning a wedding.       Therefore, I began Sean on coenzyme Q 10 100 mg by mouth twice a day, for her left ventricular dysfunction, right ventricular dysfunction, and because she was started on a statin drug.   I presented her in cath/surgery conference for cardiac  catheterization, where her tricuspid valve, pulmonary valve and coronary arteries could be evaluated, and possibly a Serina valve could be implanted and or a tricuspid valve balloon.  At the same time, I wanted her to have a transesophageal echocardiogram since her transthoracic imaging was poor.   I kept her on the same doses of her other medications, enalapril, metoprolol, atorvastatin, and aspirin.    Laboratory work which was obtained at that visit which subsequently showed a normal CBC with the exception of a mildly elevated platelet count of 352,000, and a normal CMP.  Of note, her BNP was mildly elevated to  162 pg/mL, compared to the previous value.  A free T4 was normal at 0.9 ng/dL, but her free T3 was decreased at 1.8 pg/mL, although her TSH was normal at 1.4 to micro-units per milliliter.  A cholesterol profile was repeated now that she was on atorvastatin, and that showed the cholesterol had decreased to 179 mg/dL, and the LDL cholesterol had decreased to 123.4 mg/dL, which were now normal, although her HDL remained decreased at 37 mg/dL.  Sean then saw her internal medicine doctor who began her on thyroid replacement hormone.        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  A catheterization and possible Serina valve implantation has been scheduled for February 6, 2018.  Her wedding was March 10, 2018.  Her work schedule was rather demanding, with 7-9 days in-house as a hospitalist, followed by 7 days off.   Sean was feeling better than she was at her last clinic visit, and in particular had less fatigue.  She was still short of breath with climbing stairs, but has not had any further episodes of sudden onset of shortness of breath, weakness, sweating, palpitations or chest pain.  She had started a diet and light exercise  program.     She was taking metoprolol XL 50 mg by mouth daily, enalapril 5 mg in the morning and 2.5 mg at night, aspirin 81 mg by mouth daily, coenzyme Q10 200 mg by mouth twice a day, atorvastatin 20 mg by mouth daily, Synthroid 150 µg by mouth daily, Zoloft 100 mg by mouth daily, and had weaned her Effexor down to 75 mg by mouth daily.     Exam revealed an obese but very pleasant and otherwise healthy appearing young woman in no acute distress.  Height was 175.3 cm and weight was 116.8 kg, a 1 kg weight decrease since her last clinic visit. First heart sound was normal and second heart sound was widely split.  The was a grade 2/6 systolic ejection murmur heard best at the left upper sternal border, and faintly over the precordium.  Diastole was clear (the previous diastolic rumble had resolved).  The liver edge was 2 cm below the right costal margin and unchanged.  Pulses were 2+ bilaterally.  There was no peripheral edema.     EKG showed an atrially paced rhythm at a rate of 68 bpm with a prolonged AK interval of 112 ms, with right ventricular conduction delay and negative T waves in the left precordial leads, unchanged.     Echocardiogram showed the anatomy for status post repair of tetralogy of Fallot and placement of a bioprosthetic valve in the pulmonary position.  The aortic root was mildly dilated at 31 mm, unchanged, the left atrium had increased to 45 mm, and the right ventricle measured 29 mm, unchanged.  The interventricular septum measured 12 and the left ventricular posterior wall 12 mm which were at the upper limits of normal.  The left ventricular internal dimension in diastole was 53 and in systole 37 mm giving a calculated ejection fraction of 58%, which had improved significantly from her previous ejection fraction of 46% 2 months ago.  The right ventricle was not seen well enough quantitate function, but subjectively appeared improved since the previous visit.  They bioprosthetic valve was  poorly seen in the pulmonary position, and the leaflets were thick and moving poorly.  The right atrium was significantly dilated at 54 x 53 mm.  The tricuspid valve was not seen well enough to measure the annulus.  pulmonary arteries could not be imaged.  The aortic arch was left-sided  The gradient across the tricuspid valve was 11 peak and 5 mean millimeters of mercury indicating mild stenosis which was unchanged.  The gradient across the right ventricular outflow tract was 27 mmHg peak.  There was mild pulmonary insufficiency. There was trivial tricuspid insufficiency which was inadequate to estimate right ventricular pressures.       Sean was evaluated by Dr. Chintan Cabrera, who felt that Sean did not have any abnormal tachycardia arrhythmias.  Her defibrillator, was approaching end-of-life.  Please see Dr. Cabrera's evaluation for more details.  He recommended that Sean receive monthly pacemaker/AICD evaluations to determine the best time for replacement.     My impression at that visit was Joeys left ventricular function had improved significantly since being placed on coenzyme every 10 and thyroid replacement hormone.  I believed this had led to a decrease in symptoms and an improvement in energy level.  I continued to feel, however, that Sean still required a transesophageal echocardiogram and a cardiac catheterization and possible Serina valve replacement.  I did feel, however, that this could be delayed until after her wedding, to avoid the unlikely chance of a complication that might impact her wedding.   My opinion was that waiting an additional month to perform the catheterization would not adversely affect her health.  I discussed this with Sean and her fiancé, and they were in agreement.     Sean expressed the desire to undergo pregnancy at some time in the future.  I recommended she have her cardiac catheterization and possible Serina valve implantation, and also her AICD  generator change, before becoming pregnant.  I continued the same doses of her medications.   SBE prophylaxis was recommended for dental and surgical procedures.       Sean was seen again in clinic in February, 2018.  She was generally feeling well except for one episode of tachycardia, shortness of breath and nausea that occurred out while working out with her  in the morning, and she had not yet eaten breakfast.  She had no more recurrences of these symptoms even though she has had several training sessions since then.  She otherwise felt well, and participated in all activities including exercising and working as a hospitalist, and denied chest pain, palpitations and shortness of breath and swelling.  She had postponed her cardiac catheterization and possible Serina valve placement until after the wedding.  She continued taking metoprolol 50 mg by mouth daily, enalapril 5 mg in the morning and 2.5 mg in the evening, aspirin 81 mg by mouth daily, coenzyme Q 10 100 mg by mouth twice a day, Synthroid 150 µg by mouth daily, and Zoloft 100 mg by mouth daily.  She has been weaned off her Effexor.     Physical exam revealed an obese, pleasant and healthy-appearing woman in no acute distress.  Vital signs showed a height of 175.3 cm or 5 feet 9 inches and a weight of 119 kg or 262 lbs. 7 oz., which is a weight increase of 2.2 kg since her last clinic visit.  Blood pressure in the right arm was 107/73 mmHg, pulse 87 bpm, and pulsed oximetry in room air 98%. Examination of the skin showed it to be pink and well perfused.  The lungs were clear.  Palpation of the precordium showed it to be normal with a well-healed midline scar, and a pacemaker palpated in the abdomen.  The liver edge was 2 cm below the right costal margin, unchanged.  Pulses were 2+ bilaterally.  There was no peripheral edema.     EKG was obtained which showed a paced atrial rhythm at 78 bpm, with right ventricular conduction delay, and T-wave  inversions in the limband precordial leads suggestive of ischemia, unchanged.     Echocardiogram showed evidence for repair of tetralogy of Fallot status post pulmonary valve replacement.  Two-dimensional imaging was poor.  M-mode parameters were as follows: The aortic root measured 30 mm, left atrium 43 mm, right ventricle 26 mm, interventricular septum 12 and left ventricular posterior wall 12 mm, the left ventricular internal dimension in diastole was 56 and in systole 36 mm giving a calculated ejection fraction of 55% and these numbers are normal.  The right ventricle function was judged subjectively to be reduced but unable to be quantitated The bioprosthetic valve in the pulmonary position was difficult to see, however thickened leaflets with poor movement were observed.  The aortic arch was left-sided and unobstructed.  The superior vena cava drains normally to the right atrium.  Doppler analysis using pulsed continuous-wave and color-flow:  The there was turbulence across the tricuspid valve with a gradient of 14 peak and 10 mean millimeters of mercury indicating mild tricuspid valve stenosis which was unchanged.  Mitral insufficiency was present which was mild.  The gradient across the bioprosthetic pulmonary valve was 32 mmHg peak in the setting of reduced right ventricular function.  No residual ventriculoseptal defect.  No aortic stenosis or aortic insufficiency. Impression: Status post repair of tetralogy of Fallot. Poor two-dimensional imaging.  Decreased right ventricular function.  Bioprosthetic pulmonary valve with thickened and poorly moving leaflets, and a 32 mmHg peak gradient across it in the setting of reduced right ventricular function.  Mild tricuspid valve stenosis with a 14 peak and 10 mean millimeter gradient across it, unchanged.     Sean was then evaluated by electrophysiologist Dr. Chintan Cabrera, performed a download of her pacemaker, and that showed that there was a 6 beat run of  ventricular tachycardia which resolved spontaneously, and that the pacemaker generator close to end-of-life.     My impression from that visit was that Sean had right ventricular dysfunction and pulmonary valve dysfunction, in particular pulmonary valve stenosis.   I felt that she required a pulmonary valve replacement which hopefully could be performed with a Serina valve.  I believed her right ventricular function would improve after that.  She also has ventricular tachycardia, but it was self-limited and she was protected by both her metoprolol and her AICD.  The generator also required replacement.  Dr. Cabrera has recommended the metoprolol be increased to 50 mg by mouth twice a day. Following this clinic visit, Sean called with some leg swelling, and was placed on Lasix 20 mg by mouth daily, in addition to her other medications.     Sean had an uneventful wedding and honeymoon.  She underwent replacement of her ICD generator by Dr. Chintan Cabrera on April 4, 2018.  An ICD PAWAN ROSS DR [id number]: Serial No. [id number] was placed in the abdominal pocket and the previous generator removed.  She tolerated the procedure well.  Lead testing revealed:      RV Lead:       Threshold: 1.3 V / 0.4 ms       Impedance: 646 ohms       R wave: 5.9 mV  RA Lead:       Threshold: 1.3 V / 0.4 ms       Impedance: 589 ohms       P wave: .5 mV     Sean underwent transesophageal echocardiogram and cardiac catheterization under general anesthesia on April 26, 2018.  Transesophageal echocardiogram showed:     Mild right ventricular dilatation.  Paradoxic motion of the interventricular septum.    Normal left ventricle structure and size, with normal left ventricular posterior wall motion.  Normal to mildly decreased right ventricular free wall motion.  No pericardial effusion with normal-appearing pericardium.  No atrial or ventricular shunts.  Tethering of the tricuspid valve septal leaflet, with mild doming mild tricuspid  "valve prolapse.  Normal tricuspid valve velocity, and mild tricuspid valve insufficiency, with a 29 mmHg gradient.    Immobile and echodense posterior leaflet of the prosthetic pulmonary valve, with a peak gradient of 17 mmHg and mild pulmonary insufficiency.     Catheterization showed:     Saturations: SVC 69%, RA 72%, right pulmonary artery 69%, left pulmonary artery 69%, aortic 99%.    Pressures in mmHg:  RA mean 19, RV 50/19, RPA  40/14 mean  25, LPA 43/16 mean 26, RPCWP mean 20, LPCWP mean 20, LV 101/18, ascending aorta 101/59  mean  77, and descending aorta 99/58 mean 75.    Calculations:  Using a hemoglobin of 11.3,  the Qp equaled to Qs at 2.55 L/min/m2, which gave a pulmonary vascular resistance of 2.36 Wood units/m2.  The raw cardiac output was 5.73 liters per minute.     Coronary angiography was then performed which showed normal left and right coronary arteries.  No other angiography was performed.  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     Sean's postprocedure follow-up clinic visit was on May 1, 2018.  Since her discharge from the hospital several days prior, she had been feeling generally well.  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  She had in general noticed increasing shortness of breath with walking compared to 6 months ago. 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  Her swelling had improved after starting the Lasix, she only noticed ankle swelling when on her feet for several hours.  She also noticed an improvement in palpitations after increasing her metoprolol to 50 mg by mouth twice a day, however she continued to experience palpitations every night when lying down, which lasted for a few seconds.  She had occasional "tingling" in her left upper chest which lasted for several seconds " and occurred once a week, but no chest pain.  She had not exercised in over one month.  She has continued to gain weight.     Sean was taking Lasix 20 mg by mouth daily added about 2 months prior, coenzyme every 10 100 mg by mouth twice a day, enalapril 5 mg in the morning and 2.5 mg at night, metoprolol tartrate 50 mg by mouth twice a day, Synthroid, and atorvastatin.  She was no longer taking aspirin or Zoloft.     Physical exam revealed a pleasant, obese and healthy-appearing young woman in no acute distress.  Vital signs showed a height of 175.3 cm or 5 feet 9 inches, and a weight of 121.3 kg or 267 lbs. 6 oz., which is an increase of 2 kg from her last clinic visit.  Blood pressure was 105/64 mmHg and heart rate 65 bpm.  Pulsed oximetry was not obtained     Examination of the skin showed it to be pink and well perfused area the lungs were clear.  Palpation of the precordium showed it to be normal with a well-healed midline scar.  First heart sound was normal and second heart sound was widely split.  There was a grade 2/6 systolic ejection murmur fairly well localized to the left upper sternal border.  I no longer head the diastolic rumble.  The liver edge was 2 cm below the right costal margin.  Pulses were 2+ bilaterally.  The wounds were healed.  There was no peripheral edema.     EKG showed sinus rhythm at 64 bpm, with first-degree block and a NE interval of 266 ms, right ventricular hypertrophy, and T-wave inversions in the inferior and precordial leads. This EKG had not changed since the previous one, however the T-wave in lead V6 has become inverted in the last year.     I reviewed the cardiac catheterization, pressure tracings and transesophageal echocardiogram with Sean and her .  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  I felt that this was the primary cause of her symptoms of  exercise intolerance, fatigue and peripheral edema.  The cause of this was perplexing. It was possible that her AICD patch was contributing to diastolic dysfunction, and I felt she should also be worked up for pericardial and myocardial infiltrative diseases.  I continued to feel, however, that she would still benefit from a new pulmonary valve, since one of the valve leaflets was frozen, and the valve had been in for 13 years.  I believed that the pulmonary valve stenosis, insufficiency and tricuspid valve stenosis was a minor contributor to her right-sided dysfunction and symptoms.  For now, I felt she should be medically managed.  Sean actually looked quite well at that visit, and therefore was continued on her current medical management.  I also felt that her persistent weight gain and obesity were contributing to exercise intolerance. Sean was also complaining of palpitations which needed to be characterized.     Therefore, Sean was kept on the same doses of her Lasix, coenzyme Q 10, enalapril, Synthroid, and atorvastatin.  We placed a 48 hour Holter monitor as surveillance for arrhythmias.  She was advised to slowly begin an exercise program and continue to lose weight with diet.  Laboratory work including an GWEN, rheumatoid factor, complement, sedimentation rate, high specificity CRP, CBC, reticulocyte count, BNP, and CMP.  These results subsequently showed: CBC was within normal limits; the CMP was normal with the exception of a bilirubin of 2.0 mg/dL and a bicarbonate of 22 mmol per liter; the BNP was elevated at 209 pg/mL; the high sensitivity CRP was elevated at 4.72 mg/L; the TSH was 0.037 micro international units per milliliter; the sedimentation rate was elevated at 46 mm/h; the C3 complement was elevated at 185 mg/dL.  The free T4, C4 complement, rheumatoid factor, GWEN and reticulocytes were all normal.  These results indicated an inflammatory process.  Sean was then referred to a  rheumatologist to see if an inflammatory disease was contributing to her constrictive pericarditis.  She did not obtain that consultation. Sean's  Holter monitor subsequently showed sinus rhythm at rates 59 - 210 bpm with a heart average heart rate of 64 bpm, frequent premature atrial beats, atrial bigeminy and occasional atrial couplets, with a total of 1766 premature atrial beats in 24 hours, and rare PVCs.  The increase in atrial ectopy was new.     Sean also was evaluated by Dr. Thad Ny at [facility] adult congenital heart disease program in [place].  Dr. Ny agreed that Sean had developed a constrictive pericarditis, and he felt it might be secondary to the AICD patch over the cardiac apex, and possibly intrinsic restrictive physiology from her tetralogy of Fallot.  He did not think a Serina valve placement was indicated at that time.  He felt that her tricuspid stenosis may have been secondary to her initial surgical repair.  He felt that her obesity was contributing to her dyspnea, and suggested a possible sleep study.  He felt that she probably would be able to tolerate a pregnancy in the future, but would be in a high risk category.  He recommended that her diuresis be increased and that her Lasix be increased to 40 mg by mouth daily.     Sean was then seen in May, 2018.  Since her last clinic visit 6 weeks prior, Sean was feeling better.  She had begun an exercise program with a , did primarily weight lifting but also walked on a treadmill for about 5 minutes.  She reported less shortness of breath with exercise.  Her energy level had improved since her last clinic visit.  Her previous complaints of palpitations had resolved. She was now active working 7 days at a time.  She was dieting and has lost about 12 pounds.     Sean was currently taking Lasix which had been increased to 40 mg by mouth daily and to which she has a good diuretic response, coenzyme every 10  100 mg by mouth twice a day, metoprolol tartrate 50 mg by mouth twice a day, enalapril 5 mg in the morning and 2.5 mg at night, Synthroid which had been decreased to 137 µg daily, Singulair 10 mg by mouth daily, levo cetirizine 5 mg by mouth daily, fish oil and multivitamins.     Physical exam revealed a pleasant, healthy-appearing obese young woman in no acute distress.  Vital signs showed a height of 175.3 cm or 5 feet 9 inches, and a weight of 116.1 kg or 255 lbs. 15 oz., which was a 5 kg weight decrease since her last clinic visit.  Blood pressure in the right arm was 107/71 mmHg, pulse 84 bpm and pulsed oximetry in room air 95%.     Examination of the skin showed it to be pink and well perfused.  The lungs are clear.  Palpation of the precordium showed it to be normal with a well-healed midline scar.  First heart sound was normal and second heart sound widely split.  The was a grade 2/6 systolic ejection murmur best heard at the left upper sternal border but also over the precordium. Diastole was clear.  The liver edge was 2 cm below the right costal margin.  Pulses were 2+ bilaterally.  There was no peripheral edema.     EKG was obtained which showed an atrially paced rhythm at 70 bpm with an AV conduction time of 360 ms, right ventricular hypertrophy, and T-wave inversions in leads I, II, V5 and V6 and T-wave flattening in leads aVF indicating ischemia, with a QTc interval of 441.  Compared to the previous study there was a negative T-wave in lead I.     An echocardiogram was obtained which showed status post repair of tetralogy of Fallot.  Two-dimensional imaging was poor.  M-mode parameters were as follows: The aortic root measured 30 mm unchanged, left atrium was significantly dilated at 46 mm increased, right ventricle 20 mm by M-mode and 35 mm in the long axis view, the interventricular septum measured 10 and the left ventricular posterior wall 10 mm which were both normal.  The left ventricular internal  dimension in diastole was 54 and in systole 41 mm giving a calculated ejection fraction of 47%.  The left ventricular ejection fraction has decreased since the previous study (56%).  There was no pericardial effusion.  There was decreased excursion of the septal leaflet of the tricuspid valve leading to inadequate tricuspid valve opening in diastole.  The pulmonary valve was echo dense and the posterior leaflet had decreased motion although the valve was poorly seen in general.  The ventriculoseptal defect patch was in proper position.  The right atrium was dilated and measured 47 x 46 mm. There was subjectively normal right ventricular function with a fractional area change of 47%.  There was poor subcostal imaging.  The aortic arch was left-sided and unobstructed. The right superior vena cava drained normally to the right atrium.  Doppler analysis using pulsed, continuous wave and color-flow: Tricuspid stenosis was present with a 12 peak and 5 mean millimeter gradient across it indicating mild + tricuspid stenosis which was unchanged.  The gradient across the pulmonary valve was 25 mmHg peak indicating mild obstruction and there was mild to moderate pulmonary insufficiency.  No tricuspid insufficiency.  Trivial mitral insufficiency.  No aortic stenosis, aortic insufficiency, or residual ventriculoseptal defect.  Impression: Status post repair of tetralogy of Fallot with bioprosthetic valve in the pulmonary position.  Echodense pulmonary valve with decreased motion of the posterior leaflet, but only a 25 mmHg peak gradient across it indicating mild obstruction in the presence of normal right ventricular function, with mild to moderate pulmonary insufficiency.  Moderate left ventricular dysfunction which has increased.  Normal right ventricular function.  Dilated left atrium which has increased.  Dilated right atrium.  Tricuspid stenosis with decreased excursion of the septal leaflet.     An exercise stress test was  performed which showed that Sean exercise 6 minutes and 49 seconds of the Sam protocol, indicating poor exercise tolerance for age.  Baseline heart rate was 63 bpm, increased to 137 bpm at peak exercise before decreasing back to 71 bpm 9 minutes into recovery.  This was a suboptimal peak heart rate which was probably secondary to beta-blockade.  Baseline blood pressure was 98/63 mmHg in the right arm, increased to 117/65 mmHg 1 minute into recovery, before decreasing back to 103/51 mmHg 7 minutes into recovery.  This was a blunted pressure response to exercise and may have been due to beta-blockade and ACE inhibition.  Baseline saturation was 97% and decreased to 87% at peak exercise, before increasing back to 98% 1 minute into recovery.  There were desaturations at peak exercise that probably represented intrapulmonary shunting.  Sinus rhythm with frequent PACs and occasional atrial couplets were present starting in stage II, which then resolved at peak exercise.  Occasional PACs were then seen again in recovery.   There were no ST segment depressions throughout exercise, in fact, the negative T-wave in leads 2 and V5 improved during exercise.  It should be noted that the patient was examined after exercise and the lungs were clear.  Impression: Suboptimal level of exercise for age, with blunted heart rate response and blood pressure response.  Moderate desaturations with exercise to 87% which then resolved.  Premature atrial complexes with atrial couplets increase in stage II and then are suppressed. Improvement in T-wave inversions throughout exercise.     Sean was also evaluated by Dr. Chintan Cabrera who did not detect any arrhythmias on her newly placed generator download, and did not make any recommendations in pacemaker or rhythm management.  He asked to see her again in 6 months.     My impression from that visit was Sean had improved since her last clinic visit.  She had improved symptoms of  exercise intolerance, improved energy level, improved activity, and her palpitations had resolved. She was now exercising regularly and losing weight.  She still had poor exercise tolerance, however, and desaturated with exercise, which I suspected was intrapulmonary shunting.  I did not detect pulmonary edema on exam during exercise.  She had a progression in left ventricular dysfunction for which I found no etiology.  She continued to have mild pulmonary valve stenosis with mild to moderate pulmonary insufficiency of her bioprosthetic valve.  Her right ventricular function had improved. She also had mild tricuspid valve stenosis, and moderate right atrial and left atrial dilatation which had increased.  Her recent Holter monitor has showed no ventricular ectopy, rather PACs, atrial couplets and triplets.  I felt that Sean's cardiac condition had improved with increasing diuresis and increasing exercise capacity and also weight loss.  I continued to be concerned by her diagnosis of constrictive pericarditis, her left ventricular dysfunction, and her intrapulmonary shunting.  I also felt that weight lifting was not the best exercise for her, and asked her to switch more to cardio training and less weight lifting.     Sean was instructed to increase her coenzyme Q10 to 200 mg by mouth twice a day, and stay on the same doses of her other medications.  Laboratory studies were ordered including a CBC, CMP, BNP, complement C3, CRP and a sedimentation rate, but were not obtained.   She was given a return to this clinic in 3 months time with an EKG and an echocardiogram.  SBE prophylaxis continued to be in order for all dental and surgical procedures.     Sean  was seen on October 4, 2018.  Since her previous clinic visit 4 months prior, she continued to improve and felt well.  She has continue to diet and lost another 14 lb in 4 months, in addition to the 12 lb she had lost at her last clinic visit.  She is  exercising regularly which includes Pilates, ballet bar, light weight training, and cardio roping machine, in addition to once a week with a .  She denied shortness of breath, chest pain, palpitations or swelling.  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  She was taking her medicine regularly.  She continues to have a waqar IUD, which she was planning on removing in March and then attempting pregnancy.  Her previous pacemaker download was October 3, 2018, the results of which were not yet available.     Sean was taking enalapril 5 mg in the morning and 2.5 mg in the evening, coenzyme Q10 200 mg p.o. b.i.d., metoprolol tartrate 50 mg p.o. b.i.d., Lipitor 20 mg p.o. q.day, Lasix 40 mg p.o. q.day to which she has a good diuretic response, levo cetirizine 5 mg p.o. q.day, Synthroid 137 mcg p.o. q.day, and Singulair 10 mg p.o. q.day.     Physical exam revealed a healthy-appearing and pleasant young woman in no acute distress.  Vital signs showed a height 175.3 cm or 5 ft 9 in, and weight of 109.6 kg or 241 lb 12 oz, which is a 14 lb weight decrease since her previous clinic visit in May, 2018.  Pulse was 86 beats per minute, pulse oximetry in room air 97%, and blood pressure in the right arm 107/68 mm of mercury.     Examination of the skin showed it to be pink and well perfused.  The lungs were clear.  Palpation of the precordium showed to be normal with a well-healed midline scar.  First heart sound was normal and 2nd heart sound was widely split.  There was a grade 2/6 systolic ejection murmur best heard at the left upper sternal border, but well over the precordium.  Diastole was clear.  The liver edge was at the right costal margin and improved.  Pulses were 2+ bilaterally. There was no peripheral edema.  The pacemaker was palpated in the abdomen.     EKG was obtained which showed a paced atrial rhythm at 68 beats per minute, right ventricular conduction delay, and  T-wave inversions in leads I,II, AVF, V1 - V6, unchanged.     An echocardiogram was obtained which showed evidence for repair of tetralogy of Fallot, and a bioprosthetic valve in the pulmonary position.  M-mode parameters were as follows:  The aortic root measured 30 mm unchanged, left atrium 44 mm decreased slightly (46 mm), right ventricle 28 mm by m mode and 35 mm by  2D in long axis, unchanged.  The interventricular septum measured 10 and left ventricular posterior wall 11 mm, both normal.  The left ventricular internal dimension in diastole was 52 and systole 36 mm giving a calculated ejection fraction of 59%, he which has improved and is now normal (previously 47%).  The pulmonary valve leaflets were thickened with decreased mobility, with the posterior leaflet being frozen, as noted previously.  The pulmonary valve annulus was small at 16 mm.  The right atrium was dilated measuring 56 x 47 mm which had increased.  There was decreased opening of the tricuspid valve with tethering of the septal leaflet.  Both right and left ventricular function were judged subjectively to be within normal limits.  The ventricular septal defect patch was in proper position.  Doppler analysis using pulsed, continuous and color-flow:  No tricuspid insufficiency.  Peak gradient across the tricuspid valve was 11 peak and 6 mean mm of mercury, unchanged.  Peak gradient across the bioprosthetic pulmonary valve was 30 peak and 18 mean mm of mercury, slightly increased parentheses 25 mm of mercury peak).  Pulmonary insufficiency was present which was mild and improved.  No mitral insufficiency.  No aortic stenosis or aortic insufficiency.  The gradient across the mitral valve was 2 peak and 1 mean mmHg with E wave dominance suggestive of constrictive pericarditis.  Impression:  Tetralogy of Fallot status post placement of a bioprosthetic valve in the pulmonary position.  Normal right ventricular size and function. Normal left  ventricular size and function which has returned to normal. Hypoplastic pulmonary annulus measuring 16 mm, with thickened leaflets and frozen posterior leaflet, with a 30 mm Hg peak gradient suggesting mild obstruction which has increased slightly, and mild pulmonary insufficiency which has improved.  Dilated right atrium which has increased to 56 x 47 mm, and dilated left atrium which is stable to slightly improved at 44 mm.  Decreased excursion of the septal leaflet of the tricuspid valve leading to reduced flow and an 11 peak and 6 mean mmHg gradient, unchanged.  No gradient across mitral valve but E wave dominance, consistent with constrictive pericarditis.       An exercise stress test was obtained which showed that Sean exercised 7 min and 18 sec of the Sam protocol, 1 min and 18 sec into stage 3, which was below predicted for an adult, but improved from the previous study by about 30 sec.  Baseline heart rate was 63 beats per minute, increased to 159 beats per minute at peak exercise, before decreasing back to 75 beats per minute at the end of 7 min of recovery. Baseline blood pressure was 115/64 mm of mercury, increased to 140/77 mm of mercury 1 min after peak exercise, before decreasing back to 108/65 mm of mercury 7 min into recovery.  Saturation was 98% at baseline, and then decreased to 94% at peak exercise, before returning to 99% 1 min into recovery.  Patient remained fully saturated throughout the remainder of the study.  Baseline rhythm was sinus at 63 beats per minute, and patient continued in sinus rhythm throughout exercise and recovery.  Occasional unifocal PVCs were seen starting in stage II and III, which resolved in recovery.  There were no PACs.  Baseline EKG had negative T-waves in leads 1, 2, AVF, and V1 through V6.  During exercise, T-waves became flattened in leads 1 and 2 and less negative in leads AVF, V1 through V6.  At peak exercise, T-waves were upright in leads 1, and 2,  flattened in AVF, and upright in V1 through V6.  During recovery, T-waves again became negative in leads 1, 2, and AVF, V1 through V6.  Impression:  Low level of exercise predicted for age, however an improvement from the previous study of 30 sec.  Normal blood pressure and her heart rate response to exercise.  Mild desaturations to 94% at peak exercise, which recovers within 1 min to 99%.  Occasional unifocal PVCs during exercise which resolved in recovery, and no PACs, which is an improvement from the previous study.  Inverted T-waves in inferior and precordial leads which convert to positive during exercise, and then revert back to negative during recovery.     My impression from this visit was that Sean had improvement in cardiac function, rhythm, energy, and exercise tolerance.  I attributed this to an improvement in left ventricular function, improvement in right ventricular function, a decrease in weight, an increase in exercise training, and an improvement in arrhythmias.  She still has evidence for a thickened and poorly functioning bioprosthetic valve, however the gradient across it was only 30 mm of mercury peak, with mild pulmonary insufficiency, and her right ventricular function had improved to normal. I still felt that she would need a Serina valve placement in the near future.  She still had evidence for constrictive pericarditis as evidence by her dilated right and left atria, and Doppler flow pattern in the left ventricle, however, I saw an overall improvement in cardiac function leading to decreased symptoms.     Sean's reasons for improvement were probably multifactorial, and I felt no reason to change any of her management.  Therefore, Sean  was instructed to continue on the same doses of her medications.  She was to continue her successful diet and exercise program.  I contacted Dr. Cabrera to ask for the results of her recent pacemaker download.  I suspected that she and her fetus  would tolerate a pregnancy in the future.  Sean was instructed to return to this clinic in about 3 months time, at which point she would have a dual appointment with electrophysiologist Dr. Cabrera and myself, with an EKG and an echocardiogram.  Laboratory work was ordered including CBC, CMP, BNP, thyroid function studies and lipid profile.  SBE prophylaxis was in order for dental and surgical procedures.          Laboratory work obtained on October 4th, 2018 showed a T4 normal at 9.6 mcg/dL, T3 at the lower limit normal 2.3 pg/mL, TSH at the lower limit of normal at 0.414 micro international units per mL, cholesterol was 119 mg/dL, triglycerides 59 mg per dL, HDL was low at 34 mg/dL, LDL 73.2 mg/dL.  C3 complement was now normal at 145 mg/dL, high sensitivity CRP was now normal at 2 point 7 5 mg per dL, and BNP was elevated at 178 pg/mL which had improved.  The CMP was normal except for an elevated total bilirubin of 1.8 mg/dL which had improved.  The CBC was normal with a hemoglobin of 12.4 grams/deciliter and hematocrit 38.5%.     Sean was seen in the [facility] on February 14, 2019.  Since her clinic visit 4 months prior, or near has been feeling well.  She is exercising, which consists doing some classes, rides a bicycle for 30 min, and works with a , and her exercise capacity has improved.  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 She reports a good energy level which has improved.  She denies chest pain, shortness of breath or palpitations.  She does notice swelling of her lower legs when working her 12 hr shifts, during which time she is on her feet quite a bit.     Sean is taking Lasix 40 mg p.o. q.day, to which she has a good diuretic response, metoprolol tartrate 50 mg p.o. b.i.d., enalapril 5 mg in the morning and 2.5 mg in the evening, coenzyme Q10 200 mg p.o. b.i.d., Synthroid 137 mcg p.o. q.day, Lipitor 20  mg p.o. q.day, cetirizine 10 mg p.o. q.day, fish oil and a multivitamin.  She has stopped taking Singulair.     Physical exam revealed an obese, pleasant and healthy-appearing young woman in no acute distress.  Vital signs showed a height of 5 ft 9 in or 175.3 cm, and weight of 112.4 kg or 247 lb 13 oz, which is a 3 kg weight increase since her previous clinic visit.  Blood pressure was 117/63 mm of mercury in the right arm, pulsed oximetry in room air 97%, and pulse was 71 beats per min.     Examination of the skin showed it to be pink and well perfused.  The lungs were clear.  Palpation of the precordium showed to be normal with a well-healed midline scar.  First heart sound was normal and 2nd heart sound was split.  There was a grade 2-3/6 systolic ejection murmur heard best at the left upper sternal border with minimal radiation.  There was also a grade 2/6 diastolic murmur heard at the left lower sternal border, and also probably a diastolic rumble.  The liver edge was 2 cm below the right costal margin, unchanged.  The pacemaker was palpated in the upper mid abdomen.  Pulses were 2+ bilaterally.  There was no peripheral edema.     An EKG was obtained which showed an atrially paced rhythm at 70 beats per minute, with a long PVR interval of 344 milliseconds.  There was an RV conduction delay, and negative T-waves in leads 1, 2, V5 and V6 which were unchanged.  Compared to the previous study, the patient is now atrially paced.     An echocardiogram was obtained which had very poor two-dimensional imaging, secondary to a different machine being use, and patient obesity.  Chamber sizes were made on 2 dimensional imaging as M-mode measurements were suboptimal.  The interventricular septum measured 10 and the left ventricular posterior wall 11 mm, no change.  The right ventricular dimension in the long axis view was 32 mm, improved (35 mm).  The left ventricular internal dimension diastole was 53 and in systole 40 mm  giving a calculated ejection fraction of 49%, showing mild LV dysfunction which has decreased (previously 59%).  The left atrium was dilated measuring 45 mm, unchanged.  The aortic root measured 31 mm, unchanged.  The ascending aorta measured 25 mm.  The pulmonary valve was difficult to image, and the posterior leaflet was thickened and had decreased motion, and the pulmonary valve annulus was 22 mm.  In the four-chamber view, the left atrium measured 56 x 52 mm.  The right atrium measured 52 x 45 mm, unchanged.  The tricuspid valve was poorly seen.  Subcostal views could not be obtained.  The aortic arch was left-sided and unobstructed.  Doppler analysis using pulse, continuous and color-flow:  Mitral insufficiency was present which was mild, a new finding.  Pulmonary insufficiency was present which was mild with a end-diastolic gradient of 4 mm of mercury, unchanged.  The gradient across the bioprosthetic pulmonary valve was 37 mm of mercury peak, which has increased since the previous value (30 mm of mercury).  The gradient across the tricuspid valve was 13 peak and 6 mean mm of mercury, similar to previous value.  The E to a ratio across the mitral valve was 1.52.  Tricuspid insufficiency was present which was trivial.  No aortic stenosis or aortic insufficiency.  Impression:  Very poor imaging probably secondary to technical difficulties as well as patient obesity, leading to difficulty imaging and measuring all cardiac structures.  Tetralogy of Fallot status post pulmonary valve replacement.  Normal size right ventricle which has increased since the previous study, with subjectively normal right ventricular function.  Mildly decreased left ventricular function with ejection fraction 49%.  Significantly dilated left atrium and right atrium, unchanged.  Decreased motion and thickening of the posterior leaflet of the bioprosthetic pulmonary valve, with a 37 mm Hg gradient which has increased.  New mild mitral  insufficiency.  Mild pulmonary insufficiency.  Mild to moderate tricuspid stenosis, unchanged.       Laboratory values were obtained which showed a normal CBC with a hemoglobin of 13 grams/deciliter and a hematocrit of 40%.  The free T4 was normal at 1.4, and the CMP was normal except for a mildly elevated bilirubin of 1.8, unchanged.  The BNP was mildly elevated at 156 pg/mL, unchanged.  The high density CRP was normal at 2.24.  The free T3, TSH, vitamin-D and vitamin-B levels were pending.  These values subsequently showed free T3 was mildly decreased at 2.2 pg/mL, vitamin D was decreased at 16 ng/mL, cholesterol 131 mg/dL, HDL was decreased at 30.6 mg/dL, vitamin B12 normal at 923 pg/mL.  (She was subsequently placed on vitamin D 34652 units p.o. Q.week.)     Sean was also evaluated by electrophysiologist Dr. Chintan Cabrera.  Dr. Cabrera found that there were no new arrhythmias and the pacemaker was functioning well.  He did not recommend any changes.  Please see Dr. Cabrera note for more details.     My impression from this visit was that Sean may have had a decrease in left ventricular function the etiology of which is not evident, but the quality of the echocardiogram was so poor that I feel this needs to be repeated on a better quality machine.  Her right ventricular size and function are normal.  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  Her bioprosthetic pulmonary valve gradient has increased from 30-37 mm of mercury peak, and continues to have a posterior leaflet which moves poorly.  The previous inflammatory process that occurred last year has resolved, as evidenced by a return of her CRP to normal.  I wondered whether this participated in the development of her constrictive pericarditis, although she has another reason to have this from her epicardial AICD patch.     Sean desires to become pregnant within the  next few months.  In terms of tolerating her pregnancy, her biggest risk factor is the constrictive pericarditis.  I still feel that the stenotic bioprosthetic pulmonary valve is contributing to her cardiac dysfunction, and that she should have a Serina valve placed prior to pregnancy.  I feel she will be at moderate risk for pregnancy but should be able to tolerated with careful monitoring from the Cardiology and Maternal-Fetal services.     Despite these issues, Sean continues to improve clinically, as evidenced by improved energy and exercise tolerance.  I am attributing this to her weight loss and exercise training.  Therefore, Sean was instructed to stay on the same doses of her medications.  I have asked her to return to this clinic in 1 months time with an EKG, an echocardiogram on a better quality machine, and an exercise stress test.  Following that I will present her in conference for Serina valve placement prior to pregnancy.  I will also arrange for her to have a maternal fetal evaluation in the future.  (As mentioned above, she was also subsequently placed on vitamin D3 67581 units p.o. q.week).     Sean Baez was seen in the Adult [facility] on April 4, 2019.  She is coming in today because the echocardiogram performed at her previous visit was inadequate, and another study is scheduled on a new machine.  Additionally, she is being assessed for suitability of pregnancy.  Additionally, an exercise stress test is scheduled for this visit.  Since her previous clinic visit in February, 2019, or near states that she has been feeling well.  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  She continues to exercise with a , does aerobic exercise for almost an hour and does some mild weight training.  She denies chest pain, palpitations, shortness of breath,  swelling or dizziness.  Her father was recently diagnosed with diabetes mellitus.     Sean is currently taking metoprolol 50 mg p.o. b.i.d., enalapril 5 mg in the morning and 2.5 mg in the evening, Lasix 40 mg p.o. q.day, Synthroid 137 mcg p.o. q.day, BuSpar 10 mg p.o. b.i.d., fish oil, multivitamin, coenzyme Q10 200 mg p.o. b.i.d., cetirizine 10 mg PO q.day, atorvastatin 20 mg PO q.day, and vitamin D 67265 units p.o. q.week.     Physical exam revealed an obese, pleasant young woman in no acute distress.  Vital signs showed a height of 175.3 cm or 5 ft 9 in, and weight of 112.8 kg or 248 lb 9 oz, unchanged.  Blood pressure in the right arm was 117/78 mm of mercury, pulse oximetry in room air 99% and pulse 95 beats per min.     Examination of the skin showed it to be pink and well perfused.  The lungs were clear.  Palpation of the precordium showed to be normal with a well-healed midline scar.  First heart sound was normal and 2nd heart sound was widely split.  There was a grade 3/6 harsh systolic ejection murmur heard best at the left upper sternal border, and well over the precordium.  There was also a 1/6 diastolic murmur heard along the left lower sternal border.  The liver edge was at the right costal margin, improved and the pacemaker was palpated in the abdomen.  Pulses were 2+ bilaterally. There was no peripheral edema.     An EKG was obtained which showed sinus rhythm with first-degree block at 80 beats per minute, right ventricular hypertrophy, and T-wave inversions in leads 1 2 and V1 suggestive of ischemia, no change.     An echocardiogram was obtained which showed tetralogy of Fallot status post repair with a bioprosthetic valve in the pulmonary position.  M-mode parameters were as follows:  The aortic root measured 32 mm (Z 0.9) unchanged and the left atrium measured 44 mm (Z 0.9) unchanged.  The aortic annulus measured 23 mm (Z 1.3).  The interventricular septum measured 10 mm (Z-0.5) and the left  ventricular posterior wall 10 mm (Z-0.2), both unchanged.  The left ventricular internal dimension diastole measured 53 mm and in systole 38 mm, giving a calculated ejection fraction of 54%.  The ejection fraction was borderline low but has improved since the previous study (LV EF 49%).  The right ventricle measured 35 mm (Z 0.6) increased from the previous study (32 mm).  The tricuspid valve domed in diastole with what appear to be septal leaflet for shortening.  The tricuspid valve annulus measured 28 mm and the mitral valve annulus 34 mm.  A bioprosthetic valve was in the pulmonary position with the posterior leaflet frozen and the anterior leaflet moving.  The right pulmonary artery measured 12 mm proximally (Z-1.4) and 18 mm distally.  The left pulmonary artery measured 15 mm proximally (Z-0.6) and 20 mm distally.  Right ventricular function was judged subjectively to be normal.  The right atrium was dilated and measured 57 x 52 mm which is increased since the previous study (52 x 45 mm).  The atrial septum is intact.  The inferior vena cava drain normally to the right atrium and measured 15 mm in diameter.  The right superior vena cava drain normally to the right atrium.  The aortic arch was left-sided and unobstructed.  Doppler analysis using pulsed, continuous and color-flow:  Tricuspid stenosis was present with an 11 peak and 6 mean mm Hg gradient, mild and unchanged.  Tricuspid insufficiency was present which was trace, with a gradient of 36 mm of mercury, indicating mildly elevated right ventricular pressures which has increased since the previous study (23 mm Hg).  Pulmonary insufficiency was present which was mild.  The gradient across the bioprosthetic pulmonary valve was 44 mm of mercury peak and 24 mm of mercury mean indicating mild-to-moderate obstruction which has increased since the previous study (37 mm Hg peak).  Mitral insufficiency was present which was mild, unchanged.  No aortic stenosis or  aortic insufficiency.  Bidirectional flow in the patent veins indicated right atrial hypertension.  Impression:  Tetralogy of Fallot status post complete repair and placement of a bioprosthetic valve in the pulmonary position.  Good right ventricular function.  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  Mild pulmonary insufficiency which is unchanged.  Normal branch pulmonary arteries.  Dilated right atrium measuring 57 x 52 mm which has increased. Borderline low left ventricular function which has improved to an ejection fraction of 54%.  Dilated left atrium measuring 44 mm, unchanged.  Mild tricuspid stenosis with 11 peak and 6 mean mm Hg gradient, unchanged.     An exercise stress test was obtained which showed that [place] exercise 8 min and 1 sec of the Sam protocol, demonstrating low level of exercise for age, but improved since the previous study. Baseline heart rate was 67 beats per minute, increased to 142 beats per minute at peak exercise (patient on beta-blocker), before decreasing back to 76 beats per minute after 7 min of recovery. Baseline blood pressure was 110/73 mm of mercury, increased to 146/84 mm of mercury 1 min into recovery, before decreasing back to 116/78 mm of mercury after 7 min of recovery.  Patient remained fully saturated between 98 and 100% throughout exercise and recovery.  Baseline rhythm was sinus rhythm.  Occasional to frequent unifocal PVCs started in stage I and continued into the beginning of stage II.  No PVCs were present during the latter part of stage II, the first 2 min of stage III, and all of recovery.  There were baseline T-wave inversions in leads I and II, and T-wave flattening in leads AVF, V5 and V6.  These changes did not progress throughout exercise and there were no ST segment depressions.  Impression:  Low level of exercise for age but improved since  the previous study.  Adequate heart rate response to exercise on beta blocker and adequate blood pressure response to exercise.  Occasional to frequent unifocal PVCs during stage I and II which are than suppressed by exercise and do not recur.  Baseline T-wave inversions in leads I and II, and T-wave flattening in leads AVF, V5 and V6 which do not progress, nor are there any ST segment depressions with exercise.  Normal arterial saturations.     My impression from this visit is that Sean has a poorly functioning bioprosthetic pulmonary valve that should be replaced prior to her pregnancy.  She also has constrictive pericarditis as evidence today with her dilated right atrium and left atrium, which is the larger threat to her pregnancy, however she has enough risk factors that I feel any hemodynamic abnormalities that can be corrected at a relatively low risk prior to pregnancy should be performed.  My impression is that Sean's overall cardiac condition has improved, probably secondary to her exercise regimen and medical management, rendering her a better candidate for pregnancy.  She has mild tricuspid stenosis which is unchanged.  Her pacemaker is functioning well and her rhythm is controlled on her current dose of metoprolol.     For the above reasons, I will present Steven in cardiac catheterization/surgery Conference for placement of a Serina valve prior to pregnancy.  I have also referred her for a high risk maternal fetal evaluation. Sean was instructed to continue on the same doses of these medications.  She was educated that the enalapril must be stopped prior to pregnancy and we will replace it with a different vaso dilator.  She should continue with her exercise regimen and attempt to lose more weight.  I have asked her to make another appointment in this clinic in about 4 months during our pacemaker Clinic, where her pacemaker and rhythm will be evaluated, and at that time we will obtain some  laboratory values, including a repeat vitamin D level.       Further disposition for the cardiac status of Sean Baez will be determined following the discussion of her case in catheterization conference, her evaluation by the high risk maternal fetal team, and her repeat evaluation in pacemaker clinic in 4 months.  SBE prophylaxis continues to be in order for dental and surgical procedures.    Sean was subsequently presented to the Pediatric Cardiology, Cardiac surgery conference where it was agreed that she would benefit from a Serina valve placement.    Sean Baez was seen in the Adult with Congenital Heart Disease Clinic on July 29, 2019.  Since her previous clinic visit in April, 2019, Sean has generally been feeling well.  She has been studying hard for her internal medicine boards in September.  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  She continues working out 3 times a week on her off weeks, which includes lifting some weights, doing cardio training, and working with a .  She denies chest pain or shortness of breath.  She has rare palpitations of 3 beats in duration, which he notices while lying down about once a week.  She notices mild ankle swelling at the end of her shift.  Her last remote pacemaker interrogation was in May, 2019, which showed no episodes of tachyarrhythmias.  Sean reports a good energy level which has not changed since her previous visit.    Sean is currently taking metoprolol tartrate 50 mg p.o. b.i.d., enalapril 5 mg in the morning and 2.5 mg at night, coenzyme Q10 100 mg p.o. b.i.d., Synthroid 137 mcg p.o. q.day parentheses about to be decreased to 125 mcg a day based on laboratory work), aspirin 81 mg p.o. q.day, furosemide 40 mg p.o. q.day, atorvastatin 20 mg p.o. q.day, BuSpar 10 mg p.o. b.i.d., singular 10 mg p.o. q.day (restarted for allergies) fish oil, multivitamin, and vitamin-D 50483 units p.o.  q.week.    Physical exam revealed a healthy-appearing, pleasant, obese young woman in no acute distress.  Vital signs showed a height of 175.3 cm or 5 ft 9 in and weight of 112.9 kg or 249 lb, unchanged.  Blood pressure in the right arm was 114/79 mm of mercury, pulse oximetry in room air 96%, and pulse 87 beats per min.    Examination of the skin showed it to be pink and well perfused.  The lungs were clear.  Palpation of the precordium was normal, with a well-healed midline scar and the pacemaker palpated in the upper abdomen.  First heart sound was normal and 2nd heart sound was widely split.  There was a grade 3/6 somewhat harsh systolic ejection murmur heard best at the left midsternal border and heard well along the left sternal border.  Diastole was clear which has improved (diastolic murmur heard previously).  The liver edge was 1 cm below the right costal margin which has improved.  Pulses were 2+ bilaterally.  There was no peripheral edema.    EKG showed paced atrial rhythm at 71 beats per minute, with right ventricular hypertrophy, and T-wave inversions in leads 1, 2, V5 and V6 suggestive of ischemia, and the EKG is unchanged.    An echocardiogram was obtained which showed evidence for repair of tetralogy of Fallot, with a bioprosthetic valve in the pulmonary position.  M-mode parameters were as follows:  The aortic root measured 31 mm, unchanged and the left atrium was dilated and measured 45 mm, also unchanged.  The aortic annulus was normal at 21 mm.  The interventricular septum measured 11 and the left ventricular posterior wall 11 mm both of which were normal.  The left ventricular internal dimension in diastole was 54 mm and in systole 38 mm giving a calculated ejection fraction of 55%, indicating left ventricular function at the lower limit of normal, unchanged.  A bioprosthetic valve was present in the pulmonary position with an annulus measuring 17 mm, small for the patient's size.  Both leaflets  were thickened, the posterior leaflet was frozen, and the anterior leaflet had decreased excursion.  The right pulmonary artery was not well seen and the left pulmonary artery measured 18 mm in diameter.  The right atrium was dilated and measured 58 x 57 mm in diameter which has increased (previously 57 x 52 mm in diameter).  The tricuspid leaflets were not well seen but did dome in diastole and the tricuspid valve annulus was 26 mm, unchanged.  The mitral valve had normal leaflet motion and the annulus was 33 mm.  The ventricular septal defect patch was in proper position covering a previous malaligned defect.  The atrial septum was intact.  The inferior and superior vena cava drain normally to the right atrium.  The aortic arch was left-sided and unobstructed.  Doppler analysis using pulsed, continuous and color-flow:  Turbulence was detected across the tricuspid valve with a gradient of 12 peak and 6 mean mm of mercury indicating mild to moderate tricuspid valve stenosis, unchanged.  The gradient across the bioprosthetic pulmonary valve was 42 peak and 22 mean mm of mercury indicating mild change pulmonary insufficiency was present which was moderate as indicated by retrograde flow in the main pulmonary artery, and had increased.  The pulmonary artery diastolic pressure was 5 mm of mercury.  Tricuspid insufficiency was present which was physiologic with a gradient of 32 mm of mercury.  Mitral insufficiency was trace.  There was no aortic stenosis or aortic insufficiency.  Retrograde flow was present in the hepatic veins, unchanged.  Impression:  Tetralogy of Fallot status post pulmonary valve replacement.  Stenotic and poorly functioning bioprosthetic pulmonary valve with frozen posterior leaflet, 42 peak and 22 mean mm Hg gradient, and pulmonary insufficiency which has increased to moderate.  Normal right ventricular size and function.  Low normal left ventricular function, unchanged.  Significantly dilated  right atrium which has increased.  Dilated left atrium stable.  Mild tricuspid valve stenosis with doming leaflets, unchanged.  Unobstructed left aortic arch.    Sean was sent for laboratory work, which at the time of this dictation showed:  CBC had hemoglobin 13.7 and hematocrit 41.9%, with a mildly elevated platelet count of [id number]; BNP was mildly elevated at 141 pg/mL, improved.  The comprehensive metabolic profile, vitamin-D level and lipid profile are pending.     My impression from this visit, as it has been in the past, is that Sean requires a pulmonary valve replacement for increasing pulmonary valve stenosis and pulmonary valve insufficiency. Sean will not follow the standard criteria for pulmonary valve replacement, since I believe her right ventricle is not able to dilate secondary to constrictive pericarditis.  Hopefully Sean can have a Serina valve replacement, and I feel that this can wait until after she has taken her internal medicine boards, which will be in September.  Her tricuspid valve stenosis is not significant enough to require valve replacement, in my opinion.  I believe her constrictive pericarditis is stable, and her symptoms have certainly improved with diuresis, exercise and weight loss.  She needs repeat lab work to monitor her vitamin-D and cholesterol levels after treatment.  In my opinion, once she has a well-functioning pulmonary valve, that she will be a reasonable candidate for pregnancy.  I will refer her to the high risk maternal fetal team for evaluation following her internal medicine boards.  Until then, I have instructed Sean to remain on the same doses of her medications.  It is noted that her Synthroid has been decreased. Sean was instructed to return to this clinic during our pacemaker clinic, with an EKG, echocardiogram, and pacemaker evaluation.    The vitamin-D level was subsequently found to be decreased at 26 ng/mL (previously 16 ng per mL), and  lipid profile showed cholesterol 140 mg/dL, triglycerides 56 mg/dL, HDL 40 mg/dL, an LDL cholesterol 89 mg/dL.  Sean was advised to continue her vitamin-D supplement and her atorvastatin.      Sean Baez was seen in the [facility] on October 3, 2019.  Since her previous clinic visit in July, 2019, Sean was accepted for a percutaneous pulmonary valve replacement, which is now been scheduled for December 3, 2019.  Sean was also evaluated by maternal fetal specialist Dr. Grande, who also felt that Sean should undergo a pulmonary valve replacement prior to undergoing pregnancy.  She listed the potential complications associated with pacemaker, constrictive pericarditis, arrhythmia, and advanced maternal age even after the valve was replaced successfully, and estimated a 15-20% complication rate.  Following this visit I advised Sean to decrease her enalapril from 7.5 mg to 5 mg q.day, since enalapril is teratogenic.  She was to monitor her blood pressure at home.  The purpose of this visit is to evaluate her response to decreased dose of enalapril, re-evaluate her in preparation for the pulmonary valve replacement, and download her pacemaker.      Since her previous clinic visit in July, 2019, Sean states she is feeling well.  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  She exercises at a gym 4 times a week on her non working weeks.  She was recently diagnosed with lactose intolerance and has stopped eating dairy foods.  She reports a good energy level.  She denies chest pain, shortness of breath or swelling.  She continues to have palpitations about once or twice a week, while lying down at night, which consists of 3-4 beats lasting 1-2 seconds.  This is unchanged.  She also complains of episodes of dizziness which occur during the day about once every 2 weeks, lasting a few seconds, and  which are not associated with palpitations.  She has been taking her blood pressures at home, which have ranged in the 1 teens over 60s to 70s.    Sean is currently taking enalapril which was decreased to 5 mg PO q.day, metoprolol tartrate 50 mg p.o. b.i.d., atorvastatin 20 mg PO q.day, Synthroid 125 mcg p.o. q.day, vitamin D 55664 units p.o. q.week, Co Q10 100 mg p.o. b.i.d., BuSpar 10 mg p.o. b.i.d., singular 10 mg p.o. q p.m., Zyrtec 10 mg p.o. q.a.m., fish oil, multivitamin, and Lasix 40 mg PO q.day to which she has a good diuretic response.    Physical exam revealed an obese, pleasant and healthy-appearing young woman in no acute distress.  Vital signs showed a height of 175.3 cm or 5 ft 9 in, and weight of 112.8 kg or 248 lb 9 oz, unchanged.  Blood pressure in the right arm was 111/71 mm of mercury, pulse oximetry in room air 98%, and pulse 91 beats per min.        Examination of the skin showed it to be pink and well perfused.  The lungs were clear.  Palpation of the precordium showed a well-healed midline scar and was otherwise normal.  First heart sound was normal and 2nd heart sound narrowly split.  There was a grade 2-3/6 systolic ejection murmur heard best at the left midsternal border and more faintly over the precordium.  There was also a 2/6 diastolic murmur heard at the left midsternal border only.  The liver edge was 1 cm below the right costal margin.  The pacemaker was palpated in the upper abdomen.  Pulses were 2+ bilaterally.  There was no peripheral edema.    An EKG was obtained which showed a paced atrial rhythm with prolonged AV conduction 368 milliseconds, right ventricular hypertrophy, and negative T-waves in the inferior and left precordial leads suggestive of ischemia.  The EKG is unchanged.    An echocardiogram was obtained which showed evidence for repair of tetralogy of Fallot.  M-mode parameters were as follows:  The aortic root was mildly dilated measuring 30 mm, unchanged.  The  left atrium was significantly dilated measuring 42 mm, slightly improved (previously 45 mm).  The right ventricle was normal size and measured 27 mm, unchanged.  Right ventricular systolic function was judged subjectively to be normal, but not seen well enough to quantitate.  The interventricular septum measured 12, and the left ventricular posterior wall 12 mm in diameter at the upper limits of normal.  The left ventricular internal dimension in diastole was 54 mm and in systole 36 mm giving a calculated ejection fraction of 63%, and these numbers are normal.  The ventricular septal defect patch was in proper position over the malaligned defect.  A bioprosthetic valve was seen in the pulmonary position with the anterior leaflet frozen and the posterior leaflet thickened and moving poorly, with an annulus of 15 mm, which may have been an underestimate.  The pulmonary arteries were of normal diameter with the right pulmonary artery measuring 14 mm and the left pulmonary artery 15 mm.  There was no pericardial effusion.  The septal leaflet of the tricuspid valve had decreased excursion with the tricuspid valve annulus only measuring 21 mm, compared to the mitral valve annulus measuring 40 mm.  The right atrium was significantly dilated measuring 56 x 53 mm, slightly improved (previously 58 x 57 mm).  Both the inferior and superior vena cava drain normally to the right atrium.  The left aortic arch was unobstructed.  Doppler analysis using pulse, continuous and color-flow:  Tricuspid stenosis was present with a 13 peak and 7 mean mm Hg gradient across the valve indicating moderate obstruction, unchanged.  Tricuspid insufficiency was present which was mild, with a 32 mm Hg gradient indicating mildly elevated RV pressures which was probably an underestimate.  The gradient across the bioprosthetic pulmonary valve was 38 mm Hg peak, unchanged.  There was moderate to moderately severe pulmonary insufficiency which has  increased.  Mild mitral regurgitation, unchanged.  No aortic insufficiency or aortic stenosis.  No residual ventricular septal defect.  Impression:  Tetralogy of Fallot status post pulmonary valve replacement.  Sten out a pulmonary valve with frozen anterior leaflet and thickened posterior leaflet with poor excursion, 38 mm Hg peak gradient and moderate to moderately severe pulmonary insufficiency.  Subjectively normal right ventricular function although difficult to quantitate.  Normal size right ventricle.  Dilated left atrium, improved and dilated right atrium, also improved.  Tricuspid stenosis due to poor excursion of the septal leaflet with a 13 peak and 7 mean mm Hg gradient indicating moderate obstruction, unchanged.  Mild mitral regurgitation.  Normal left ventricular function.    A pacemaker download was performed which showed no arrhythmias, and 9 years left of battery life.  A review of her remote download from August 25, 2019, showed a 5 beat run of supraventricular tachycardia on July 15, 2019.    My impression from this visit was that Sean requires a pulmonary valve replacement and this is now scheduled for December 3, 2019 by Dr. Justice.  I believe she will have improvement in cardiac output and exercise capacity following this procedure.  Sean continues to have normal left ventricular size and function, although the negative T-wave throughout the inferior an left precordial leads suggests that she continues to have some myocardial ischemia, which is unchanged.  Sean continues to complain of palpitations, which are probably short episodes of supraventricular tachycardia, one of which was documented on her last remote pacemaker evaluation.  I am wondering whether this is secondary to the singular, which she takes at night.  For that reason, she should stop this medication if tolerated, to see if her palpitations improved. 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  Since her atria are smaller, and she is feeling better, this may slowly be improving which would be an excellent prognostic factor for her upcoming pregnancy.  Since Sean is planning pregnancy, and since her blood pressure is under good control, I would like to continue to taper the enalapril.  If she cannot come off enalapril than I will switch her to methyldopa for her pregnancy.  I also believe that Sean may be able to decrease her diuretic, since she does not have peripheral edema and since she continues to have a brisk diuretic response to her current dose.    Therefore, Sean was instructed to decrease her enalapril from 5 mg to 2.5 mg PO q.day, and continue to monitor her blood pressure at home.  She should stop her singular and see whether her palpitations are improving.  She should decrease her Lasix to every other day about 1 week later, and monitor herself for signs of peripheral edema.  She should begin taking prenatal vitamins.  These recommendations may change following her pulmonary valve placement.  She was asked to make an appointment in this clinic 1 week following her pulmonary valve placement.    Further disposition for the cardiac status of  Sean Baez will be determined following the her pulmonary valve placement scheduled for December 3rd, her blood pressure response to the decreased dose of enalapril, her response to the decreased dose of Lasix, and her response to stopping her Singular.      Sean underwent transcatheter placement of an Kelley 26 Joe 3 valve in the pulmonary position by Dr. Marlyn Early December 3, 2019.   Her initial pressures were as follows:  RA A-wave 15 mm of mercury mean 12 mm of mercury, RV 49/11 mm of mercury, and aorta was 101/51 with a mean of 67 mm of mercury cardiac output 2.85 liters/minute per meter squared, SVR 8.7 resistance units times meter squared.  After placement of the Kelley  valve pressures were as follows:    Right ventricle 30/12 mm of mercury and aorta 123/66 with a mean of 86 mm of mercury.  Thus, right ventricular pressure decreased from half systemic to 1/4 systemic.  Initially, there was a 20 mm Hg peak gradient across the pulmonary valve with moderate pulmonary valve insufficiency.  Angiography showed no post implant pulmonary valve insufficiency.    Sean Baez was seen in the [facility] on December 12, 2019. This is her first post visit following her pulmonary valve implantation.  Since the valve was placed, colette orozco has been feeling better.  She has improved breathing and improved energy.  She has not exercised since the valve placement, but denies chest pain, shortness of breath, palpitations, swelling or dizziness.  She did not feel any difference decreasing her dose of enalapril and Lasix, or stopping the singular.  Following her valve placement, Dr. Currie decreased her metoprolol from 50 mg b.i.d. to 25 mg p.o. B.i.d.  She was begun on low-dose aspirin after the valve replacement.  Her previous episodes of palpitations have resolved.    Sean is currently taking vitamin-D 36446 units q.week, metoprolol succinate which has been decreased to 25 mg p.o. b.i.d., enalapril which has been decreased to 5 mg PO q.day, aspirin 81 mg PO q.day which was started after the valve placement, Lasix which has been decreased to 40 mg every other day and to which she has a good diuretic response, Endy all brand of Co Q10 100 mg p.o. b.i.d., Synthroid 125 mcg p.o. q.day, BuSpar 10 mg p.o. b.i.d., atorvastatin 20 mg PO q.day, fish oil b.i.d., multivitamin and cetirizine 10 mg PO q.day.    Physical exam revealed an obese, pleasant and healthy-appearing young woman in no acute distress.  Vital signs showed a height 175.3 cm or 5 ft 9 in and weight of 112.1 kg or 247 lb, which is a weight decrease of about a half a kg.  Blood pressure in the right arm  was 110/75 mm of mercury, pulse oximetry in room air 98% and pulse 87 beats per min.    Examination of the skin showed it to be pink and well perfused.  The lungs were clear.  Palpation of the precordium showed to be normal with a well-healed midline scar.  First heart sound was normal and 2nd heart sound was widely split.  There was a grade 2/6 soft systolic ejection murmur localized to the left upper sternal border, and a softer since the previous visit.  Diastole was clear as the diastolic murmur has also resolved.  The liver edge was at the right costal margin.  Pulses were 2+ bilaterally.  There was bruising around the right inguinal area and the left inguinal area was clean.  There was no peripheral edema.  The pacemaker was palpated in the abdomen.    EKG showed a paced atrial rhythm at 65 beats per minute with prolonged AV conduction of 350 milliseconds, right bundle-branch block, and T-wave inversions in leads I, II, AVF and V4-V6, unchanged.    An echocardiogram was obtained which showed 4 cardiac chambers in the normal location with evidence for repair of tetralogy of Fallot, and a bioprosthetic valve in the pulmonary position.  M-mode parameters were as follows:  The aortic root measured 33 mm, unchanged and the left atrium was dilated at 43 mm, also unchanged.  The aortic annulus measured 23 mm.  The right ventricle measured 28 mm, unchanged.  The interventricular septum measured 12 mm and the left ventricular posterior wall 12 mm both unchanged.  The left ventricular internal dimension in diastole was 49 mm and in systole 35 mm giving a calculated ejection fraction of 59% and these numbers were normal.  The ventricular septal defect patch was in position over a malaligned defect.  A stent was visualized in the right ventricular outflow tract.  There was a bioprosthetic valve within the stent with an annulus measuring 22 mm and with thin, flexible and well moving leaflets.  The right pulmonary artery  measured 16 mm and the left pulmonary artery 16 mm, both normal.  Thickening was seen at the left ventricular apex indicative of the AICD patch.  The right atrium was dilated measuring 56 x 52 mm, unchanged.  The coronary sinus was dilated.  There was decreased excursion of the septal leaflet of the tricuspid valve.  Right ventricular function was judged subjectively to be normal but not quantitated.  The aortic arch was left-sided and unobstructed.  There was no pericardial effusion.  Subcostal views were poor although the atrial septum was seen to be intact.  The inferior vena cava could not be imaged.  The superior vena cava drain normally to the right atrium.  Doppler analysis using pulsed, continuous and color-flow:  Mitral insufficiency was present which was mild, unchanged.  No tricuspid regurgitation.  The gradient across the tricuspid valve was 11 mm of mercury peak and 5 mm of mercury mean indicating mild tricuspid stenosis, unchanged.  The gradient across the pulmonary valve was 26 mm of mercury peak, indicating mild obstruction which has improved.  No pulmonary insufficiency, which has improved.  No aortic stenosis or aortic insufficiency.  Impression:  Tetralogy of Fallot status post repair, and most recently status post Kelley valve placement in the pulmonary position.  Well-functioning bioprosthetic valve with thin and flexible leaflets, a mild gradient of 26 mm of mercury peak, and no pulmonary insufficiency.  Subjectively normal right ventricular size and function.  Normal left ventricular size and function.  Dilated left atrium and dilated right atrium, unchanged.  Mild tricuspid stenosis with an 11 peak and 5 mean mm Hg gradient due to decreased excursion of the septal leaflet of the tricuspid valve, unchanged.    It is my impression that Sean has improved significantly after placement of her bioprosthetic valve in the pulmonary position.  The valve is functioning well with mild stenosis and  no insufficiency.  More importantly, her symptoms of shortness of breath and fatigue have improved.  She feels well and this is actually the best I have seen her look in several years.  Her palpitations have also resolved.  Her blood pressure continues to be normal despite decreasing her antihypertensive medications, and I am wondering if we could continue to decrease the enalapril since we will need to stop it for future pregnancy.  She continues on high-dose vitamin-D supplementation.  I am clearing her for pregnancy and delivery from a cardiovascular point of view.    Therefore, Sean was instructed to decrease her enalapril to 2.5 mg once a day and to continue to take her blood pressures at home.  If her systolic is less than 120 mm of mercury and her diastolic is less than 80 mm of mercury, she should stop her enalapril.  She was instructed to continue routine dental cleanings at least twice a year with SBE prophylaxis since she is at increased risk for endocarditis with a percutaneously placed pulmonary valve.  She should stop her high dose vitamin D and take 2000 units per day.  Sean was given an appointment in this clinic in 1 months time with a pacemaker download, exercise stress test, and we will at that time check a vitamin-D level and other basic labs.    Further disposition for the cardiac status of Sean Baez will be determined following the response to decreasing and maybe stopping the enalapril, and her pacemaker download an exercise stress test and laboratory work in about 1 month.      Sean Baez was seen in the [facility] on January 14, 2020.  Since her previous clinic visit in December, 2019, Sean has developed supraventricular tachycardia.  On December 24, 2019, she had the sudden onset of palpitations at night that lasted for couple of minutes, which was accompanied by shortness of breath, but not accompanied by chest pain.  It should be noted  that Sean's metoprolol had been decreased from 50 mg p.o. b.i.d. to 50 mg in the more of metoprolol tartrate and 25 mg in the evening of metoprolol tartrate, following her discharge from the hospital.  Additionally, her evening dose of metoprolol was 3 hr late.  At that time, I had Sean take an extra dose of metoprolol and then switched her to 50 mg p.o. b.i.d. metoprolol succinate.  She was also told to stop her enalapril.  She has not had no further episodes palpitations since that time.  She has returned to work without difficulty.  She is scheduled to return to the gym tomorrow.  She denies swelling or dizziness.  She has been taking her blood pressure at home and it runs approximately 115/65 mm of mercury with heart rate 60s.  She reports a good energy level and generally feels well.  She is very worried about going back into SVT and this has caused her increased anxiety.    Sean is currently taking metoprolol succinate which has been increased to 50 mg p.o. b.i.d., atorvastatin 20 mg p.o. q.p.m., Synthroid 125 mcg p.o. q.day, aspirin 81 mg PO q.day, Co Q10 Qunol brand 200 mg p.o. b.i.d., BuSpar 10 mg p.o. b.i.d., fish oil, vitamin-D 25696 units p.o. q.week, and Lasix 40 mg p.o. every other day to which she has a good diuretic response.    Physical exam revealed an obese but pleasant and otherwise healthy appearing young woman in no acute distress.  Vital signs showed a height of 175 cm or 5 ft 9 in and weight of 115.6 kg or 254 lb 15 oz, a gain of 3 kg since her previous visit.  Blood pressure in the right arm was 123/78 mm of mercury, pulse oximetry in room air 98% and pulse 88 beats per min.    Examination of the skin showed it to be pink and well perfused.  The lungs were clear.  Palpation of the precordium showed to be normal with a well-healed midline scar.  First heart sound was normal and 2nd heart sound was widely split.  There was a grade 2/6 systolic ejection murmur fairly well localized  to the left upper sternal border, but faintly heard over the precordium.  There was also a diastolic rub which was a new finding.  The liver edge was 1 cm below the right costal margin.  The pacemaker was palpated in the upper abdomen.  Pulses were 2+ bilaterally.  There was no peripheral edema.    EKG showed a paced atrial rhythm at 73 beats per minute with right ventricular hypertrophy, and negative T-waves in leads 1, 2, AVF, V5 and V6 suggesting ischemia, no change.    A pacemaker download was performed which showed that Sean had a 1 min and 24 sec episode of SVT on December 24th at a rate of 154 beats per min.  There were several shorter episodes of SVT earlier in December that were several seconds each.  After December 24th, there were no other episodes of SVT (coinciding with the increased dose of metoprolol and switching to succinate).  The atrium was paced 84.4% of the time and the ventricle 0.2% of the time.  Sensing thresholds, impedance, and pacing thresholds were normal.  There was an estimated 8.9 years of battery life remaining.  Her underlying rhythm was sinus bradycardia at 50 beats per minute.    An exercise stress test was performed which showed that Sean exercised 7 min and 14 sec of the Sam could protocol, 1 min and 14 sec into stage III, demonstrating below normal level of exercise for age, which has decreased since her previous study.  This indicates a combination of poor conditioning and heart disease.  Baseline heart rate was 62 beats per minute, increased to 147 beats per minute at peak exercise, before decreasing back to 75 beats per minute at the end of 7 min of recovery, indicating a normal heart rate response to exercise in the presence of beta blockade.  Baseline blood pressure was 106/74 mm of mercury, increased to 156/77 mm of mercury at peak exercise, before decreasing back to 119/77 mm of mercury at the end of 7 min of recovery, indicating a normal blood pressure response  to exercise.  Patient remained fully saturated between 97 and 99% throughout the study.  Baseline rhythm was a paced atrial rhythm.  During stage I there were frequent unifocal PVCs.  Patient converted to sinus rhythm during stage II, and frequent unifocal PVCs continued throughout stage II.  PVCs were suppressed during stage III.  One ventricular couplet was noted 24 sec into recovery, and occasional unifocal PVCs were seen throughout the 1st min of recovery.  No further PVCs were seen throughout the next 6 min of recovery.  Within 1 min of recovery, frequent PACs began, which then decreased in frequency throughout the next 6 min of recovery.  There was 1 atrial couplet was noted during the 1st minute of recovery, and again at 6 min into recovery.  There was no sustained ectopy.  There were no ST segment depressions.  Impression:  Low level of exercise achieved for age, probably secondary to a combination of poor conditioning and heart disease.  Normal heart rate and blood pressure response to exercise.  Patient begin with paced atrial rhythm and then converts to sinus rhythm during stage II.  Frequent unifocal PVCs from during stage II which are suppressed in stage III, with 1 ventricular couplet in early recovery, and occasional PVCs during the 1st minute of recovery.  Frequent PACs occurred during the 1st minute of recovery which then decreased in frequency, with 2 atrial couplets noted during recovery.  No ST segment changes.    My impression from this visit was that Sean developed supraventricular tachycardia following the decrease of her metoprolol dosage which occurred after her pulmonary valve replacement.  The episodes resolved following increasing her metoprolol dose back to 50 mg p.o. b.i.d., and switching from metoprolol tartrate to succinate.  Sean has underlying ventricular tachycardia and now supraventricular tachycardia, secondary to her congenital heart disease, multiple surgeries, and  possible constrictive pericarditis.  These rhythm disturbances are being controlled on her current dose of metoprolol.  She is at risk for going into to both atrial and ventricular tachycardia, as evidence by the frequent PVCs and PACs that occur with exercise, and the occurrence of SVT on a lower dose of metoprolol.  I believe as long as she continues on metoprolol succinate at 50 mg p.o. b.i.d., that she is unlikely to have an episode of either VT or SVT.  I have tried to reassure Sean about this as she is very anxious about a recurrence of her arrhythmias and being shocked.  She is now , and living alone.  I have asked her to sign up for a medical alert system, which will offer additional protection for her and also give her some peace of mind.  By physical exam, Sean continues to have a good result from her pulmonary valve replacement, with the murmur representing flow across the newly placed valve.  She has a new rub, which is probably secondary to her constrictive pericarditis.  We will check these at her next clinic visit when we will repeat an echocardiogram. Sean has probably been adequately treated for her vitamin-D deficiency and she may switch to over-the-counter vitamin-D.  She will need to have her level checked again.  She is cleared for participating in physical exercise at her gym.  Finally, Sean has tolerated being weaned off her enalapril, and her blood pressures are normal on the current dose of metoprolol.      Therefore, Sean was instructed to continue on the same doses of her cardiac medications.  She may stop her high-dose vitamin-D supplementation, and begin vitamin-D3 4000 units p.o. Q.day.  I would like for her to return to this clinic in 3 months time with an EKG, echocardiogram, and prior to that she should have a CBC, CMP, BNP, and vitamin-D level.      SBE prophylaxis continues to be in order for dental and surgical procedures.    Thank you very much for allowing  up to participate the care of your patient.       Sincerely      Chantel Saleh       All Flowsheet Templates

## 2020-02-17 RX ORDER — FUROSEMIDE 40 MG/1
TABLET ORAL
Qty: 30 TABLET | Refills: 0 | Status: SHIPPED | OUTPATIENT
Start: 2020-02-17

## 2020-03-31 ENCOUNTER — TELEPHONE (OUTPATIENT)
Dept: PEDIATRIC CARDIOLOGY | Facility: CLINIC | Age: 35
End: 2020-03-31

## 2020-03-31 NOTE — TELEPHONE ENCOUNTER
Spoke with Sean regarding remote followup options. Will follow up with Dr Carpenter and we will dis-enroll from REMOTE monitoring.

## 2021-02-05 NOTE — ANESTHESIA POSTPROCEDURE EVALUATION
Anesthesia Post Evaluation    Patient: Sean Baez    Procedure(s) Performed: Procedure(s) (LRB):  CATHETERIZATION, HEART, COMBINED RIGHT AND RETROGRADE LEFT, FOR CONGENITAL HEART DEFECT (N/A)  REPLACEMENT, PULMONARY VALVE (N/A)    Final Anesthesia Type: general    Patient location during evaluation: PACU  Patient participation: Yes- Able to Participate  Level of consciousness: awake and alert, awake and oriented  Post-procedure vital signs: reviewed and stable  Pain management: adequate  Airway patency: patent    PONV status at discharge: No PONV  Anesthetic complications: no      Cardiovascular status: blood pressure returned to baseline, stable and hemodynamically stable  Respiratory status: unassisted, spontaneous ventilation and room air  Hydration status: euvolemic  Follow-up not needed.          Vitals Value Taken Time   /65 12/3/2019  7:47 PM   Temp 36.1 °C (97 °F) 12/3/2019  7:10 PM   Pulse 60 12/3/2019  7:51 PM   Resp 12 12/3/2019  7:51 PM   SpO2 100 % 12/3/2019  7:51 PM   Vitals shown include unvalidated device data.      No case tracking events are documented in the log.      Pain/Carlos Score: Carlos Score: 10 (12/3/2019  7:30 PM)         Call to the patient, made aware of recommendations as listed below. Correctly verbalized increase of potassium dosing. MAR updated. Encouraged patient to contact us with any questions, concerns, or changes in condition.

## (undated) DEVICE — CATH WEDGE 7FRX110CM

## (undated) DEVICE — CATH MULTI-TRACK 5FR

## (undated) DEVICE — SHEATH INTRODUCER 7FR 11CM

## (undated) DEVICE — INTRODUCER GLIDESHEATH 4F 10CM

## (undated) DEVICE — COVER BAND BAG 30X30

## (undated) DEVICE — OMNIPAQUE 350 200ML

## (undated) DEVICE — COVER BAND BAG 28 X 12

## (undated) DEVICE — KIT MICROINTRO 4F .018X40X7CM

## (undated) DEVICE — GUIDEWIRE SUPRA CORE 035 190CM

## (undated) DEVICE — Device

## (undated) DEVICE — GUIDEWIRE EMERALD 150CM PTFE

## (undated) DEVICE — CATH LEADER 20X8 VYGON

## (undated) DEVICE — SYR MARK 7 ARTERION 150ML

## (undated) DEVICE — PACK PEDIATRIC ANGIOGRAPHY

## (undated) DEVICE — PROTECTION STATION PLUS

## (undated) DEVICE — SPIKE CONTRAST CONTROLLER

## (undated) DEVICE — KIT CUSTOM MANIFOLD

## (undated) DEVICE — WIRE LUNDERQUIST 260CM

## (undated) DEVICE — PRESTO INFLATION DEVICE